# Patient Record
Sex: MALE | Race: OTHER | Employment: UNEMPLOYED | URBAN - METROPOLITAN AREA
[De-identification: names, ages, dates, MRNs, and addresses within clinical notes are randomized per-mention and may not be internally consistent; named-entity substitution may affect disease eponyms.]

---

## 2023-07-31 ENCOUNTER — APPOINTMENT (OUTPATIENT)
Dept: CT IMAGING | Age: 38
DRG: 699 | End: 2023-07-31
Payer: MEDICAID

## 2023-07-31 ENCOUNTER — APPOINTMENT (OUTPATIENT)
Dept: CT IMAGING | Age: 38
DRG: 699 | End: 2023-07-31
Attending: EMERGENCY MEDICINE
Payer: MEDICAID

## 2023-07-31 ENCOUNTER — HOSPITAL ENCOUNTER (INPATIENT)
Age: 38
LOS: 2 days | Discharge: HOME | DRG: 699 | End: 2023-08-02
Attending: EMERGENCY MEDICINE | Admitting: INTERNAL MEDICINE
Payer: MEDICAID

## 2023-07-31 DIAGNOSIS — D73.5 SPLENIC INFARCT: ICD-10-CM

## 2023-07-31 DIAGNOSIS — N28.0 RENAL INFARCT (HCC): Primary | ICD-10-CM

## 2023-07-31 LAB
ALBUMIN SERPL-MCNC: 4.5 G/DL (ref 3.4–5)
ALBUMIN/GLOB SERPL: 1.6 {RATIO} (ref 1.1–2.2)
ALP SERPL-CCNC: 82 U/L (ref 40–129)
ALT SERPL-CCNC: 67 U/L (ref 10–40)
ANION GAP SERPL CALCULATED.3IONS-SCNC: 15 MMOL/L (ref 3–16)
ANTI-XA UNFRAC HEPARIN: <0.1 IU/ML (ref 0.3–0.7)
ANTI-XA UNFRAC HEPARIN: <0.1 IU/ML (ref 0.3–0.7)
APTT BLD: 28.5 SEC (ref 22.7–35.9)
AST SERPL-CCNC: 59 U/L (ref 15–37)
BACTERIA URNS QL MICRO: ABNORMAL /HPF
BASOPHILS # BLD: 0.1 K/UL (ref 0–0.2)
BASOPHILS NFR BLD: 0.5 %
BILIRUB SERPL-MCNC: 0.9 MG/DL (ref 0–1)
BILIRUB UR QL STRIP.AUTO: ABNORMAL
BUN SERPL-MCNC: 14 MG/DL (ref 7–20)
CALCIUM SERPL-MCNC: 9.5 MG/DL (ref 8.3–10.6)
CHLORIDE SERPL-SCNC: 103 MMOL/L (ref 99–110)
CLARITY UR: CLEAR
CO2 SERPL-SCNC: 22 MMOL/L (ref 21–32)
COLOR UR: YELLOW
CREAT SERPL-MCNC: 0.8 MG/DL (ref 0.9–1.3)
DEPRECATED RDW RBC AUTO: 13.8 % (ref 12.4–15.4)
EOSINOPHIL # BLD: 0 K/UL (ref 0–0.6)
EOSINOPHIL NFR BLD: 0 %
GFR SERPLBLD CREATININE-BSD FMLA CKD-EPI: >60 ML/MIN/{1.73_M2}
GLUCOSE SERPL-MCNC: 120 MG/DL (ref 70–99)
GLUCOSE UR STRIP.AUTO-MCNC: NEGATIVE MG/DL
HCT VFR BLD AUTO: 44.5 % (ref 40.5–52.5)
HGB BLD-MCNC: 14.9 G/DL (ref 13.5–17.5)
HGB UR QL STRIP.AUTO: ABNORMAL
INR PPP: 1.21 (ref 0.84–1.16)
KETONES UR STRIP.AUTO-MCNC: 40 MG/DL
LEUKOCYTE ESTERASE UR QL STRIP.AUTO: NEGATIVE
LIPASE SERPL-CCNC: 12 U/L (ref 13–60)
LYMPHOCYTES # BLD: 0.7 K/UL (ref 1–5.1)
LYMPHOCYTES NFR BLD: 4.3 %
MCH RBC QN AUTO: 29.1 PG (ref 26–34)
MCHC RBC AUTO-ENTMCNC: 33.4 G/DL (ref 31–36)
MCV RBC AUTO: 87 FL (ref 80–100)
MONOCYTES # BLD: 0.8 K/UL (ref 0–1.3)
MONOCYTES NFR BLD: 4.9 %
NEUTROPHILS # BLD: 14.6 K/UL (ref 1.7–7.7)
NEUTROPHILS NFR BLD: 90.3 %
NITRITE UR QL STRIP.AUTO: NEGATIVE
PH UR STRIP.AUTO: 5.5 [PH] (ref 5–8)
PLATELET # BLD AUTO: 240 K/UL (ref 135–450)
PMV BLD AUTO: 8.3 FL (ref 5–10.5)
POTASSIUM SERPL-SCNC: 4.1 MMOL/L (ref 3.5–5.1)
PROT SERPL-MCNC: 7.3 G/DL (ref 6.4–8.2)
PROT UR STRIP.AUTO-MCNC: 100 MG/DL
PROTHROMBIN TIME: 15.3 SEC (ref 11.5–14.8)
RBC # BLD AUTO: 5.11 M/UL (ref 4.2–5.9)
RBC #/AREA URNS HPF: ABNORMAL /HPF (ref 0–4)
SODIUM SERPL-SCNC: 140 MMOL/L (ref 136–145)
SP GR UR STRIP.AUTO: >=1.03 (ref 1–1.03)
UA COMPLETE W REFLEX CULTURE PNL UR: ABNORMAL
UA DIPSTICK W REFLEX MICRO PNL UR: YES
URN SPEC COLLECT METH UR: ABNORMAL
UROBILINOGEN UR STRIP-ACNC: 1 E.U./DL
WBC # BLD AUTO: 16.2 K/UL (ref 4–11)
WBC #/AREA URNS HPF: ABNORMAL /HPF (ref 0–5)

## 2023-07-31 PROCEDURE — 85025 COMPLETE CBC W/AUTO DIFF WBC: CPT

## 2023-07-31 PROCEDURE — 74177 CT ABD & PELVIS W/CONTRAST: CPT

## 2023-07-31 PROCEDURE — 80053 COMPREHEN METABOLIC PANEL: CPT

## 2023-07-31 PROCEDURE — 96374 THER/PROPH/DIAG INJ IV PUSH: CPT

## 2023-07-31 PROCEDURE — 2580000003 HC RX 258: Performed by: INTERNAL MEDICINE

## 2023-07-31 PROCEDURE — 99285 EMERGENCY DEPT VISIT HI MDM: CPT

## 2023-07-31 PROCEDURE — 96361 HYDRATE IV INFUSION ADD-ON: CPT

## 2023-07-31 PROCEDURE — 6360000002 HC RX W HCPCS: Performed by: INTERNAL MEDICINE

## 2023-07-31 PROCEDURE — 6360000004 HC RX CONTRAST MEDICATION: Performed by: EMERGENCY MEDICINE

## 2023-07-31 PROCEDURE — 70450 CT HEAD/BRAIN W/O DYE: CPT

## 2023-07-31 PROCEDURE — 81001 URINALYSIS AUTO W/SCOPE: CPT

## 2023-07-31 PROCEDURE — 85520 HEPARIN ASSAY: CPT

## 2023-07-31 PROCEDURE — 85730 THROMBOPLASTIN TIME PARTIAL: CPT

## 2023-07-31 PROCEDURE — 6370000000 HC RX 637 (ALT 250 FOR IP): Performed by: INTERNAL MEDICINE

## 2023-07-31 PROCEDURE — 85610 PROTHROMBIN TIME: CPT

## 2023-07-31 PROCEDURE — 36415 COLL VENOUS BLD VENIPUNCTURE: CPT

## 2023-07-31 PROCEDURE — 96375 TX/PRO/DX INJ NEW DRUG ADDON: CPT

## 2023-07-31 PROCEDURE — 1200000000 HC SEMI PRIVATE

## 2023-07-31 PROCEDURE — 83690 ASSAY OF LIPASE: CPT

## 2023-07-31 PROCEDURE — 87040 BLOOD CULTURE FOR BACTERIA: CPT

## 2023-07-31 PROCEDURE — 6360000002 HC RX W HCPCS: Performed by: EMERGENCY MEDICINE

## 2023-07-31 PROCEDURE — 2580000003 HC RX 258: Performed by: EMERGENCY MEDICINE

## 2023-07-31 RX ORDER — HEPARIN SODIUM 1000 [USP'U]/ML
4000 INJECTION, SOLUTION INTRAVENOUS; SUBCUTANEOUS PRN
Status: DISCONTINUED | OUTPATIENT
Start: 2023-07-31 | End: 2023-08-02

## 2023-07-31 RX ORDER — POLYETHYLENE GLYCOL 3350 17 G/17G
17 POWDER, FOR SOLUTION ORAL DAILY PRN
Status: DISCONTINUED | OUTPATIENT
Start: 2023-07-31 | End: 2023-08-02 | Stop reason: HOSPADM

## 2023-07-31 RX ORDER — SODIUM CHLORIDE 9 MG/ML
INJECTION, SOLUTION INTRAVENOUS PRN
Status: DISCONTINUED | OUTPATIENT
Start: 2023-07-31 | End: 2023-08-02 | Stop reason: HOSPADM

## 2023-07-31 RX ORDER — WARFARIN SODIUM 5 MG/1
5 TABLET ORAL
Status: COMPLETED | OUTPATIENT
Start: 2023-07-31 | End: 2023-07-31

## 2023-07-31 RX ORDER — METOPROLOL SUCCINATE 25 MG/1
25 TABLET, EXTENDED RELEASE ORAL DAILY
Status: DISCONTINUED | OUTPATIENT
Start: 2023-07-31 | End: 2023-08-02 | Stop reason: HOSPADM

## 2023-07-31 RX ORDER — HEPARIN SODIUM 1000 [USP'U]/ML
4000 INJECTION, SOLUTION INTRAVENOUS; SUBCUTANEOUS ONCE
Status: COMPLETED | OUTPATIENT
Start: 2023-07-31 | End: 2023-07-31

## 2023-07-31 RX ORDER — SODIUM CHLORIDE 0.9 % (FLUSH) 0.9 %
5-40 SYRINGE (ML) INJECTION PRN
Status: DISCONTINUED | OUTPATIENT
Start: 2023-07-31 | End: 2023-08-02 | Stop reason: HOSPADM

## 2023-07-31 RX ORDER — NICOTINE 21 MG/24HR
1 PATCH, TRANSDERMAL 24 HOURS TRANSDERMAL DAILY
Status: DISCONTINUED | OUTPATIENT
Start: 2023-07-31 | End: 2023-08-02 | Stop reason: HOSPADM

## 2023-07-31 RX ORDER — OXYCODONE HYDROCHLORIDE 5 MG/1
10 TABLET ORAL EVERY 4 HOURS PRN
Status: DISCONTINUED | OUTPATIENT
Start: 2023-07-31 | End: 2023-08-02 | Stop reason: HOSPADM

## 2023-07-31 RX ORDER — PROCHLORPERAZINE EDISYLATE 5 MG/ML
10 INJECTION INTRAMUSCULAR; INTRAVENOUS EVERY 6 HOURS PRN
Status: DISCONTINUED | OUTPATIENT
Start: 2023-07-31 | End: 2023-08-02 | Stop reason: HOSPADM

## 2023-07-31 RX ORDER — OXYCODONE HYDROCHLORIDE 5 MG/1
7.5 TABLET ORAL ONCE
Status: COMPLETED | OUTPATIENT
Start: 2023-07-31 | End: 2023-07-31

## 2023-07-31 RX ORDER — ACETAMINOPHEN 325 MG/1
650 TABLET ORAL EVERY 6 HOURS PRN
Status: DISCONTINUED | OUTPATIENT
Start: 2023-07-31 | End: 2023-08-02 | Stop reason: HOSPADM

## 2023-07-31 RX ORDER — SODIUM CHLORIDE 0.9 % (FLUSH) 0.9 %
5-40 SYRINGE (ML) INJECTION EVERY 12 HOURS SCHEDULED
Status: DISCONTINUED | OUTPATIENT
Start: 2023-07-31 | End: 2023-08-02 | Stop reason: HOSPADM

## 2023-07-31 RX ORDER — HEPARIN SODIUM 10000 [USP'U]/100ML
1650 INJECTION, SOLUTION INTRAVENOUS CONTINUOUS
Status: DISCONTINUED | OUTPATIENT
Start: 2023-07-31 | End: 2023-08-02

## 2023-07-31 RX ORDER — 0.9 % SODIUM CHLORIDE 0.9 %
1000 INTRAVENOUS SOLUTION INTRAVENOUS ONCE
Status: COMPLETED | OUTPATIENT
Start: 2023-07-31 | End: 2023-07-31

## 2023-07-31 RX ORDER — OXYCODONE HYDROCHLORIDE 5 MG/1
5 TABLET ORAL ONCE
Status: COMPLETED | OUTPATIENT
Start: 2023-07-31 | End: 2023-07-31

## 2023-07-31 RX ORDER — KETOROLAC TROMETHAMINE 30 MG/ML
15 INJECTION, SOLUTION INTRAMUSCULAR; INTRAVENOUS ONCE
Status: COMPLETED | OUTPATIENT
Start: 2023-07-31 | End: 2023-07-31

## 2023-07-31 RX ORDER — ACETAMINOPHEN 650 MG/1
650 SUPPOSITORY RECTAL EVERY 6 HOURS PRN
Status: DISCONTINUED | OUTPATIENT
Start: 2023-07-31 | End: 2023-08-02 | Stop reason: HOSPADM

## 2023-07-31 RX ORDER — ONDANSETRON 2 MG/ML
4 INJECTION INTRAMUSCULAR; INTRAVENOUS ONCE
Status: COMPLETED | OUTPATIENT
Start: 2023-07-31 | End: 2023-07-31

## 2023-07-31 RX ORDER — HEPARIN SODIUM 1000 [USP'U]/ML
2000 INJECTION, SOLUTION INTRAVENOUS; SUBCUTANEOUS PRN
Status: DISCONTINUED | OUTPATIENT
Start: 2023-07-31 | End: 2023-08-02

## 2023-07-31 RX ORDER — OXYCODONE HYDROCHLORIDE 5 MG/1
5 TABLET ORAL EVERY 4 HOURS PRN
Status: DISCONTINUED | OUTPATIENT
Start: 2023-07-31 | End: 2023-08-02 | Stop reason: HOSPADM

## 2023-07-31 RX ADMIN — ACETAMINOPHEN 650 MG: 325 TABLET ORAL at 16:34

## 2023-07-31 RX ADMIN — HEPARIN SODIUM 4000 UNITS: 1000 INJECTION INTRAVENOUS; SUBCUTANEOUS at 13:51

## 2023-07-31 RX ADMIN — IOPAMIDOL 75 ML: 755 INJECTION, SOLUTION INTRAVENOUS at 10:40

## 2023-07-31 RX ADMIN — PROCHLORPERAZINE EDISYLATE 10 MG: 5 INJECTION INTRAMUSCULAR; INTRAVENOUS at 16:35

## 2023-07-31 RX ADMIN — HEPARIN SODIUM 820 UNITS/HR: 10000 INJECTION, SOLUTION INTRAVENOUS at 13:53

## 2023-07-31 RX ADMIN — METOPROLOL SUCCINATE 25 MG: 25 TABLET, EXTENDED RELEASE ORAL at 16:34

## 2023-07-31 RX ADMIN — OXYCODONE HYDROCHLORIDE 7.5 MG: 5 TABLET ORAL at 14:34

## 2023-07-31 RX ADMIN — OXYCODONE HYDROCHLORIDE 10 MG: 5 TABLET ORAL at 19:17

## 2023-07-31 RX ADMIN — ACETAMINOPHEN 650 MG: 325 TABLET ORAL at 22:42

## 2023-07-31 RX ADMIN — SACUBITRIL AND VALSARTAN 1 TABLET: 24; 26 TABLET, FILM COATED ORAL at 21:37

## 2023-07-31 RX ADMIN — ONDANSETRON 4 MG: 2 INJECTION INTRAMUSCULAR; INTRAVENOUS at 09:42

## 2023-07-31 RX ADMIN — OXYCODONE HYDROCHLORIDE 5 MG: 5 TABLET ORAL at 12:55

## 2023-07-31 RX ADMIN — KETOROLAC TROMETHAMINE 15 MG: 30 INJECTION, SOLUTION INTRAMUSCULAR at 09:42

## 2023-07-31 RX ADMIN — SODIUM CHLORIDE 1000 ML: 9 INJECTION, SOLUTION INTRAVENOUS at 09:42

## 2023-07-31 RX ADMIN — WARFARIN SODIUM 5 MG: 5 TABLET ORAL at 17:37

## 2023-07-31 RX ADMIN — HEPARIN SODIUM 4000 UNITS: 1000 INJECTION INTRAVENOUS; SUBCUTANEOUS at 21:33

## 2023-07-31 RX ADMIN — SODIUM CHLORIDE, PRESERVATIVE FREE 10 ML: 5 INJECTION INTRAVENOUS at 21:35

## 2023-07-31 ASSESSMENT — PAIN DESCRIPTION - FREQUENCY: FREQUENCY: CONTINUOUS

## 2023-07-31 ASSESSMENT — PAIN DESCRIPTION - DIRECTION: RADIATING_TOWARDS: FLANK

## 2023-07-31 ASSESSMENT — PAIN DESCRIPTION - DESCRIPTORS
DESCRIPTORS: SORE
DESCRIPTORS: THROBBING
DESCRIPTORS: BURNING;THROBBING

## 2023-07-31 ASSESSMENT — PAIN SCALES - GENERAL
PAINLEVEL_OUTOF10: 8
PAINLEVEL_OUTOF10: 10
PAINLEVEL_OUTOF10: 7
PAINLEVEL_OUTOF10: 7
PAINLEVEL_OUTOF10: 6
PAINLEVEL_OUTOF10: 4
PAINLEVEL_OUTOF10: 7
PAINLEVEL_OUTOF10: 7
PAINLEVEL_OUTOF10: 10
PAINLEVEL_OUTOF10: 7
PAINLEVEL_OUTOF10: 7
PAINLEVEL_OUTOF10: 4
PAINLEVEL_OUTOF10: 8

## 2023-07-31 ASSESSMENT — PAIN DESCRIPTION - LOCATION
LOCATION: FLANK
LOCATION: FLANK
LOCATION: ABDOMEN
LOCATION: FLANK;ABDOMEN
LOCATION: ABDOMEN;FLANK
LOCATION: ABDOMEN;BACK
LOCATION: FLANK
LOCATION: FLANK

## 2023-07-31 ASSESSMENT — PAIN DESCRIPTION - ORIENTATION
ORIENTATION: RIGHT

## 2023-07-31 ASSESSMENT — PAIN - FUNCTIONAL ASSESSMENT
PAIN_FUNCTIONAL_ASSESSMENT: ACTIVITIES ARE NOT PREVENTED
PAIN_FUNCTIONAL_ASSESSMENT: PREVENTS OR INTERFERES SOME ACTIVE ACTIVITIES AND ADLS
PAIN_FUNCTIONAL_ASSESSMENT: 0-10

## 2023-07-31 ASSESSMENT — PAIN DESCRIPTION - PAIN TYPE
TYPE: ACUTE PAIN
TYPE: ACUTE PAIN

## 2023-07-31 ASSESSMENT — PAIN DESCRIPTION - ONSET: ONSET: ON-GOING

## 2023-07-31 NOTE — ED NOTES
Lab in room to draw blood Dr Osiel Viera at bedside     Diane HermelindoBenton, [place]  07/31/23 9734

## 2023-07-31 NOTE — PROGRESS NOTES
4 Eyes Skin Assessment     The patient is being assess for  Admission    I agree that 2 RN's have performed a thorough Head to Toe Skin Assessment on the patient. ALL assessment sites listed below have been assessed. Areas assessed by both nurses: Chris Mckenzie RN  [x]   Head, Face, and Ears   [x]   Shoulders, Back, and Chest  [x]   Arms, Elbows, and Hands   [x]   Coccyx, Sacrum, and Ischum  [x]   Legs, Feet, and Heels        Does the Patient have Skin Breakdown?   No         Jasbir Prevention initiated:  No   Wound Care Orders initiated:  No      [facility] nurse consulted for Pressure Injury (Stage 3,4, Unstageable, DTI, NWPT, and Complex wounds):  NA      Nurse 1 eSignature: Electronically signed by Mandie Sellers RN on 7/31/23 at 4:29 PM EDT    **SHARE this note so that the co-signing nurse is able to place an eSignature**    Nurse 2 eSignature: {Esignature:[id number]}

## 2023-07-31 NOTE — ED NOTES
Abdomen soft and non distended. Active bs x 4 quads. Right flank pain radiating to rlq.  C/o nausea with no diarrhea     Candy Avila RN  07/31/23 1738

## 2023-07-31 NOTE — ED NOTES
Message sent to  [facility] r/t pt pain control     Odalys Arita RN  07/31/23 4941

## 2023-07-31 NOTE — ED PROVIDER NOTES
[address] [place] [address]  ED    CHIEF COMPLAINT  Flank Pain (Right sided started yesterday. Given Fentanyl 50 mcg im and Zofran 4 mg oral per EMS)       HISTORY OF PRESENT ILLNESS  Sharona Patterson is a 42 y.o. male with past medical history muscular dystrophy who presents to the ED with right flank pain. Symptoms started yesterday evening. Pain is described as aching, worse with movement, better with rest, associate with nausea but no emesis. Denies fever, chest pain, shortness of breath, diarrhea, dysuria, hematuria. Presents by EMS who administered 50 mcg fentanyl IM and 4 mg Zofran p.o. prior to arrival    I have reviewed the following from the nursing documentation:    Past Medical History:   Diagnosis Date    Muscular dystrophy ([address])      Past Surgical History:   Procedure Laterality Date    PACEMAKER INSERTION       History reviewed. No pertinent family history.   Social History     Socioeconomic History    Marital status: Single     Spouse name: Not on file    Number of children: Not on file    Years of education: Not on file    Highest education level: Not on file   Occupational History    Not on file   Tobacco Use    Smoking status: Every Day     Packs/day: 0.25     Types: Cigarettes    Smokeless tobacco: Not on file   Substance and Sexual Activity    Alcohol use: No    Drug use: Yes     Types: Marijuana Renay Maher)    Sexual activity: Not on file   Other Topics Concern    Not on file   Social History Narrative    Not on file     Social Determinants of Health     Financial Resource Strain: Not on file   Food Insecurity: Not on file   Transportation Needs: Not on file   Physical Activity: Not on file   Stress: Not on file   Social Connections: Not on file   Intimate Partner Violence: Not on file   Housing Stability: Not on file     Current Facility-Administered Medications   Medication Dose Route Frequency Provider Last Rate Last Admin    heparin (porcine) injection 4,000 Units  4,000 Units IntraVENous Once Luis A Leon

## 2023-07-31 NOTE — PROGRESS NOTES
Patient from ED, report from [facility]. Four eye assessment completed with Roque RN, no skin issues noted. Assessment completed and documented. VSS. A/ox4. C/o continuous pain right abd pain that radiates to right flank, heat packs and tylenol given per mar. X1 with walker. Patient with high arches and unable to touch ground with heels due to diagnosis. Heparin drip running through PIV. Bed locked and in lowest position. Bedside table and call light within reach. Denies further needs at this time.

## 2023-07-31 NOTE — PLAN OF CARE
PLAN OF CARE    Received request for inpatient admission. Admitting provider Dr. Elba Klein notified.     Leonel Crow MD  7/31/2023  11:27 AM

## 2023-07-31 NOTE — H&P
nonspecific. Mucosal irregularity of the distal stomach. Correlate for gastritis. Small right pleural effusion. 1.5 cm left adrenal mass, consistent with lipid-rich benign adenoma. No follow-up imaging is recommended. JACR 2017 Aug; 14(8):1038-44, JCAT 2016 Susi Ruperto; 40(2):194-200, Urol J 2006 Spring; 3(2):71-4.        No Order

## 2023-08-01 LAB
ALBUMIN SERPL-MCNC: 3.7 G/DL (ref 3.4–5)
ALP SERPL-CCNC: 69 U/L (ref 40–129)
ALT SERPL-CCNC: 166 U/L (ref 10–40)
ANION GAP SERPL CALCULATED.3IONS-SCNC: 11 MMOL/L (ref 3–16)
ANTI-XA UNFRAC HEPARIN: 0.13 IU/ML (ref 0.3–0.7)
ANTI-XA UNFRAC HEPARIN: 0.14 IU/ML (ref 0.3–0.7)
ANTI-XA UNFRAC HEPARIN: 0.17 IU/ML (ref 0.3–0.7)
ANTI-XA UNFRAC HEPARIN: 0.25 IU/ML (ref 0.3–0.7)
AST SERPL-CCNC: 128 U/L (ref 15–37)
BILIRUB DIRECT SERPL-MCNC: 0.3 MG/DL (ref 0–0.3)
BILIRUB INDIRECT SERPL-MCNC: 0.6 MG/DL (ref 0–1)
BILIRUB SERPL-MCNC: 0.9 MG/DL (ref 0–1)
BUN SERPL-MCNC: 14 MG/DL (ref 7–20)
CALCIUM SERPL-MCNC: 8.7 MG/DL (ref 8.3–10.6)
CHLORIDE SERPL-SCNC: 104 MMOL/L (ref 99–110)
CO2 SERPL-SCNC: 19 MMOL/L (ref 21–32)
CREAT SERPL-MCNC: 0.8 MG/DL (ref 0.9–1.3)
DEPRECATED RDW RBC AUTO: 13.8 % (ref 12.4–15.4)
GFR SERPLBLD CREATININE-BSD FMLA CKD-EPI: >60 ML/MIN/{1.73_M2}
GLUCOSE SERPL-MCNC: 97 MG/DL (ref 70–99)
HAV IGM SERPL QL IA: NORMAL
HBV CORE IGM SERPL QL IA: NORMAL
HBV SURFACE AG SERPL QL IA: NORMAL
HCT VFR BLD AUTO: 42.7 % (ref 40.5–52.5)
HCV AB SERPL QL IA: NORMAL
HGB BLD-MCNC: 14.3 G/DL (ref 13.5–17.5)
HIV 1+2 AB+HIV1 P24 AG SERPL QL IA: NORMAL
HIV 2 AB SERPL QL IA: NORMAL
HIV1 AB SERPL QL IA: NORMAL
HIV1 P24 AG SERPL QL IA: NORMAL
INR PPP: 1.35 (ref 0.84–1.16)
LV EF: 28 %
LVEF MODALITY: NORMAL
MCH RBC QN AUTO: 29 PG (ref 26–34)
MCHC RBC AUTO-ENTMCNC: 33.5 G/DL (ref 31–36)
MCV RBC AUTO: 86.7 FL (ref 80–100)
PLATELET # BLD AUTO: 189 K/UL (ref 135–450)
PMV BLD AUTO: 8.4 FL (ref 5–10.5)
POTASSIUM SERPL-SCNC: 3.6 MMOL/L (ref 3.5–5.1)
PROT SERPL-MCNC: 6.3 G/DL (ref 6.4–8.2)
PROTHROMBIN TIME: 16.6 SEC (ref 11.5–14.8)
RBC # BLD AUTO: 4.93 M/UL (ref 4.2–5.9)
SODIUM SERPL-SCNC: 134 MMOL/L (ref 136–145)
WBC # BLD AUTO: 16.3 K/UL (ref 4–11)

## 2023-08-01 PROCEDURE — 85610 PROTHROMBIN TIME: CPT

## 2023-08-01 PROCEDURE — 99222 1ST HOSP IP/OBS MODERATE 55: CPT | Performed by: INTERNAL MEDICINE

## 2023-08-01 PROCEDURE — 85027 COMPLETE CBC AUTOMATED: CPT

## 2023-08-01 PROCEDURE — C8929 TTE W OR WO FOL WCON,DOPPLER: HCPCS

## 2023-08-01 PROCEDURE — 86701 HIV-1ANTIBODY: CPT

## 2023-08-01 PROCEDURE — 93356 MYOCRD STRAIN IMG SPCKL TRCK: CPT

## 2023-08-01 PROCEDURE — 80048 BASIC METABOLIC PNL TOTAL CA: CPT

## 2023-08-01 PROCEDURE — 85520 HEPARIN ASSAY: CPT

## 2023-08-01 PROCEDURE — 87390 HIV-1 AG IA: CPT

## 2023-08-01 PROCEDURE — 97166 OT EVAL MOD COMPLEX 45 MIN: CPT

## 2023-08-01 PROCEDURE — 97535 SELF CARE MNGMENT TRAINING: CPT

## 2023-08-01 PROCEDURE — 1200000000 HC SEMI PRIVATE

## 2023-08-01 PROCEDURE — 36415 COLL VENOUS BLD VENIPUNCTURE: CPT

## 2023-08-01 PROCEDURE — 80074 ACUTE HEPATITIS PANEL: CPT

## 2023-08-01 PROCEDURE — 2580000003 HC RX 258: Performed by: INTERNAL MEDICINE

## 2023-08-01 PROCEDURE — 86702 HIV-2 ANTIBODY: CPT

## 2023-08-01 PROCEDURE — 97530 THERAPEUTIC ACTIVITIES: CPT

## 2023-08-01 PROCEDURE — 6370000000 HC RX 637 (ALT 250 FOR IP): Performed by: INTERNAL MEDICINE

## 2023-08-01 PROCEDURE — 97162 PT EVAL MOD COMPLEX 30 MIN: CPT

## 2023-08-01 PROCEDURE — 6360000002 HC RX W HCPCS: Performed by: INTERNAL MEDICINE

## 2023-08-01 PROCEDURE — 80076 HEPATIC FUNCTION PANEL: CPT

## 2023-08-01 RX ORDER — HEPARIN SODIUM 1000 [USP'U]/ML
2000 INJECTION, SOLUTION INTRAVENOUS; SUBCUTANEOUS ONCE
Status: COMPLETED | OUTPATIENT
Start: 2023-08-01 | End: 2023-08-01

## 2023-08-01 RX ORDER — WARFARIN SODIUM 5 MG/1
5 TABLET ORAL
Status: COMPLETED | OUTPATIENT
Start: 2023-08-01 | End: 2023-08-01

## 2023-08-01 RX ADMIN — SODIUM CHLORIDE, PRESERVATIVE FREE 10 ML: 5 INJECTION INTRAVENOUS at 20:10

## 2023-08-01 RX ADMIN — HEPARIN SODIUM 2000 UNITS: 1000 INJECTION INTRAVENOUS; SUBCUTANEOUS at 17:15

## 2023-08-01 RX ADMIN — OXYCODONE HYDROCHLORIDE 10 MG: 5 TABLET ORAL at 00:08

## 2023-08-01 RX ADMIN — OXYCODONE HYDROCHLORIDE 10 MG: 5 TABLET ORAL at 22:32

## 2023-08-01 RX ADMIN — METOPROLOL SUCCINATE 25 MG: 25 TABLET, EXTENDED RELEASE ORAL at 08:03

## 2023-08-01 RX ADMIN — OXYCODONE HYDROCHLORIDE 5 MG: 5 TABLET ORAL at 08:03

## 2023-08-01 RX ADMIN — HEPARIN SODIUM 2000 UNITS: 1000 INJECTION INTRAVENOUS; SUBCUTANEOUS at 10:14

## 2023-08-01 RX ADMIN — SACUBITRIL AND VALSARTAN 1 TABLET: 24; 26 TABLET, FILM COATED ORAL at 20:07

## 2023-08-01 RX ADMIN — WARFARIN SODIUM 5 MG: 5 TABLET ORAL at 17:14

## 2023-08-01 RX ADMIN — SACUBITRIL AND VALSARTAN 1 TABLET: 24; 26 TABLET, FILM COATED ORAL at 08:03

## 2023-08-01 RX ADMIN — OXYCODONE HYDROCHLORIDE 10 MG: 5 TABLET ORAL at 15:43

## 2023-08-01 RX ADMIN — HEPARIN SODIUM 2000 UNITS: 1000 INJECTION INTRAVENOUS; SUBCUTANEOUS at 04:13

## 2023-08-01 ASSESSMENT — PAIN SCALES - GENERAL
PAINLEVEL_OUTOF10: 6
PAINLEVEL_OUTOF10: 6
PAINLEVEL_OUTOF10: 7
PAINLEVEL_OUTOF10: 7
PAINLEVEL_OUTOF10: 8

## 2023-08-01 ASSESSMENT — PAIN DESCRIPTION - LOCATION
LOCATION: BACK
LOCATION: FLANK
LOCATION: ABDOMEN;FLANK

## 2023-08-01 ASSESSMENT — PAIN DESCRIPTION - ORIENTATION
ORIENTATION: RIGHT
ORIENTATION: RIGHT

## 2023-08-01 ASSESSMENT — PAIN DESCRIPTION - DESCRIPTORS
DESCRIPTORS: SORE
DESCRIPTORS: ACHING

## 2023-08-01 NOTE — PLAN OF CARE
Problem: Safety - Adult  Goal: Free from fall injury  Outcome: Progressing  Flowsheets (Taken 8/1/2023 0140)  Free From Fall Injury: Instruct family/caregiver on patient safety     Problem: Pain  Goal: Verbalizes/displays adequate comfort level or baseline comfort level  8/1/2023 0140 by Shahnaz Castillo RN  Outcome: Not Progressing  Flowsheets (Taken 8/1/2023 0140)  Verbalizes/displays adequate comfort level or baseline comfort level:   Encourage patient to monitor pain and request assistance   Assess pain using appropriate pain scale   Administer analgesics based on type and severity of pain and evaluate response   Implement non-pharmacological measures as appropriate and evaluate response  7/31/2023 1618 by Luis Ji RN  Outcome: Progressing

## 2023-08-01 NOTE — CONSULTS
Consult Placed     Who: leisa  Date:7/31/23  Time:1700     Electronically signed by Sheila Cevallos on 7/31/2023 at 4:56 PM
Pharmacy Note  Warfarin Consult  Dx: paroxysmal Aflutter   Goal INR range 2-3   Home Warfarin dose: New restart   New start Warfarin with Heparin bridge. Would recommend continue bridge until INR therapeutic. Date  INR  Warfarin   7/31                1.21                     5 mg     Recommend Warfarin 5 mg tonight x1. Daily INR ordered. Rx will continue to manage therapy per consult order. Valerie Gibson, [place]    7/31/2023 5:24 PM      7/31/2023 at 2004  Anti-Xa Unfrac Heparin   <0.10   IU/mL  - Heparin _4000_ units IVP bolus and then increase Heparin infusion to _1090_ units/hr. - Recheck Anti-Xa in 6 hours at 0300.   Valerie Gibson PharmD    7/31/2023 9:01 PM  ______________________________________________________________
Pharmacy to Manage Heparin Infusion per [facility]    Dx: Renal infarct, splenic infarct  Pt wt = 68 kg (will use adjusted wt if actual body weight > 120% ideal body weight). Baseline aPTT = 28.5 sec ,  INR= 1.21, Anti-Xa= pending    Heparin (weight-based) Infusion: CAD/STEMI/NSTEMI/UA/AFib)   Heparin 4000 unit bolus followed by Heparin infusion at 820 units/hr  Recheck anti-Xa (unless aPTT being used) in 6 hours.   Goal anti-Xa 0.3-0.7 IU/mL    Shahla Mobley, PharmD [unfilled]  1:40 PM      ----------------------------  8/1 8818  Low-Dose Heparin Drip  Current Rate: 1090 units/hr  8/1 @ 0307 Anti-Xa Level= 0.13  Plan: Per pharmacy dosing, we will give a bolus dose of 2000 units and increase heparin drip rate to 1230 units/hr    Next Anti-Xa Level: 8/1 @ [address], PharmD 8/1/2023 3:49 AM    Low-Dose Heparin Drip  Current Rate: 1230 units/hr  8/1 @ 0932 Anti-Xa Level= 0.14  Plan: Per pharmacy dosing, we will give a bolus dose of 2000 units and increase heparin drip rate to 1370 units/hr    Next Anti-Xa Level: 8/1 @ 1600    Carolina Howe, PharmD 8/1/2023 10:06 AM    8/1 1557  Anti-Xa - 0.17  Give 2000 unit heparin bolus and increase heparin infusion to 1510 units/hr  Next anti-Xa at [address] [place] [address], PharmD 8/1/2023 4:31 PM    -----------------------------  8/2 0002  Low-Dose Heparin Drip  Current Rate: 1510 units/hr  8/1 @ 2317 Anti-Xa Level= 0.25  Plan: Per pharmacy dosing, we will give a bolus dose of 2000 units and increase heparin drip rate to 1650 units/hr    Next Anti-Xa Level: 8/2 @ 1808 Shaheen Marie, PharmD 8/2/2023 12:01 AM    ------------------------------  8/2 8982  Low-Dose Heparin Drip  Current Rate: 1650 units/hr  8/2 @ 0615 Anti-Xa Level= 0.31  Plan: Per pharmacy dosing, we will not make any changes at this time    Next Anti-Xa Level: 8/2 @ [address], PharmD 8/2/2023 6:40 AM
admission  Patient willing to restart taking all his medications now including beta-blocker and Entresto  Resume outpatient follow-up with cardiology following discharge      All questions and concerns were addressed to the patient/family. Alternatives to my treatment as well as risks and benefits of proposed treatment were discussed. Tobacco use was discussed with the patient and educated on the negative effects. Thank you for allowing to us to participate in the care or Martín Blakely. Please call with questions.          Jennifer Wilson MD, [facility], [address] [place] [address]  8/1/2023  [phone]      Inadvertent computerized transcription errors may be present

## 2023-08-01 NOTE — PROGRESS NOTES
Pharmacy Note  Warfarin Consult  Dx: paroxysmal Aflutter   Goal INR range 2-3   Home Warfarin dose: New restart   New start Warfarin with Heparin bridge. Would recommend continue bridge until INR therapeutic. Date                 INR                  Warfarin   7/31                1.21                     5 mg   8/1                  1.35                     5 mg     Recommend Warfarin 5 mg tonight x1. Daily INR ordered. Rx will continue to manage therapy per consult order.     Ebony Isbell, PharmD 8/1/2023 3:54 AM

## 2023-08-01 NOTE — PROGRESS NOTES
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 A diagnosis of Splenic infarct was also pertinent to this visit. Past Medical History:  has a past medical history of Muscular dystrophy ([address] Past Surgical History:  has a past surgical history that includes Pacemaker insertion. Assessment   Body Structures, Functions, Activity Limitations Requiring Skilled Therapeutic Intervention: Decreased functional mobility ; Decreased endurance;Decreased posture;Decreased balance;Decreased strength;Decreased ROM; Increased pain  Assessment: [facility] to [facility] with diagnosis of R flank pain with hx of myotonic dystrophy. PTA pt has been living with his friend and friends mother in an apartment with 2+6 MAX. Pt was recently incarcerated and was using a WC in prision. Started using walker in the apartment once out and WC in the community. Pt was able to complete transfers independently. Pt currently presents below baseline function requires grossly mod A for bed mobility and transfers. Initially min A for ambulation, progresses to Max with fatigue and bilateral knee buckling and pt needing to sit down. Unable to ambulate further. Pt will benefit from skilled PT services to address current deficits. It is rec pt DC to IPR when deemed medically appropraite. Pt motivated to particiapte in therapy and demos ability to toelrate 3hrs of therapy a day. Co-tx collaboration this date to safely meet goals and will have better occupational performance outcomes with in a co-treatment than 1:1 session.     Therapy Prognosis: Good  Decision Making: Medium Complexity  Barriers to Learning: None  Requires PT Follow-Up: Yes  Activity

## 2023-08-01 NOTE — PROGRESS NOTES
Occupational Therapy  Facility/Department: [facility] C3 TELE/MED SURG/ONC  Occupational Therapy Initial Assessment/Treatment    Name: Charissa Harding  : 1985  MRN: [id number]  Date of Service: 2023    Discharge Recommendations:  IP Rehab      Therapy discharge recommendations take into account each patient's current medical complexities and are subject to input/oversight from the patient's healthcare team.     Barriers to Home Discharge:   [x] Steps to access home entry or bed/bath: 2+6 MAX   [x] Unable to transfer, ambulate, or propel wheelchair household distances without assist   [] Limited available assist at home upon discharge    [] Patient or family requests d/c to post-acute facility    [] Poor cognition/safety awareness for d/c to home alone    [] Unable to maintain ordered weight bearing status    [] Patient with salient signs of long-standing immobility   [x] Decreased independence with ADLs   [x] Increased risk for falls   [] Other:    If pt is unable to be seen after this session, please let this note serve as discharge summary. Please see case management note for discharge disposition. Thank you. 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 A diagnosis of Splenic infarct was also pertinent to this visit. Past Medical History:  has a past medical history of Muscular dystrophy ([address] Past Surgical History:  has a past surgical history that includes Pacemaker insertion. Assessment   Performance deficits / Impairments: Decreased functional mobility ; Decreased ADL status; Decreased strength;Decreased cognition  Patient admitted from home w/ abdominal pain, h/o muscular dystrophy, rather IPTA using RW or w/c. Limited flexion in elbows this is baseline. Patient today, Rinda Hashimoto for LE dressing with Phan progressing to max of 2 for mobility 2* BLE buckling during mobility.  After evaluation, pt found to be presenting with the above mentioned occupational

## 2023-08-01 NOTE — PROGRESS NOTES
Hospital Medicine Progress Note        Subjective:  He was feeling a little less pain. General appearance: No apparent distress, appears stated age and cooperative. HEENT: Pupils equal, round. Conjunctivae/corneas clear. Neck: No jugular venous distention. Respiratory:  Normal respiratory effort. Bilaterally without Rales/Wheezes/Rhonchi. Cardiovascular: Normal rate and regular rhythm with normal S1/S2 without murmurs, rubs or gallops. Abdomen: Soft, non-tender, non-distended with normal bowel sounds. Musculoskeletal: No cyanosis bilaterally. No edema. Without deformity. Skin: No jaundice. No rashes or lesions. Neurologic:  Neurovascularly intact without any focal sensory/motor deficits. Cranial nerves: II-XII intact, grossly non-focal.  Psychiatric: Alert and oriented, normal insight. Assessment and Plan:       The patient is a pleasant 42 Y M with a h/o ongoing cigarette smoking and marijuana use, former heroin abuse (he says he used to snort it years ago, never injected anything), complex cardiac disease, and myotonic dystrophy. He was incarcerated for years on drug charges, just got out a couple months ago, has been staying [place] some people\" in their apartment, is not working. He says he has continued to abstain from opioids since being released from snf. He is able to walk with a walker, manages to navigate stairs. He had been feeling fairly normal until yesterday, when he developed severe R flank / R abdominal pain. It continued to worsen so he came to the ED. Labs and vitals were fairly unremarkable, but CT showed a large R renal infarct and a possible thrombus of his R renal artery. There was also a splenic infarct. His sigmoid colon looked nonspecifically inflamed and radiology felt that ischemia was a possibility there as well. He isn't having any diarrhea or hematochezia                On chart review I see that the patient has a h/o Atrial flutter.   He

## 2023-08-01 NOTE — PLAN OF CARE
Occupational  Therapy POC: Increase patients ADLs/functional status to baseline.   Electronically signed by Gilbert Jenkins OT on 8/1/2023 at 1:48 PM

## 2023-08-01 NOTE — PLAN OF CARE
Problem: Pain  Goal: Verbalizes/displays adequate comfort level or baseline comfort level  8/1/2023 1047 by Irineo Olsen RN  Flowsheets (Taken 8/1/2023 1047)  Verbalizes/displays adequate comfort level or baseline comfort level:   Encourage patient to monitor pain and request assistance   Administer analgesics based on type and severity of pain and evaluate response   Assess pain using appropriate pain scale  8/1/2023 0140 by Isaac Hernandez RN  Outcome: Not Progressing  Flowsheets (Taken 8/1/2023 0140)  Verbalizes/displays adequate comfort level or baseline comfort level:   Encourage patient to monitor pain and request assistance   Assess pain using appropriate pain scale   Administer analgesics based on type and severity of pain and evaluate response   Implement non-pharmacological measures as appropriate and evaluate response     Problem: Safety - Adult  Goal: Free from fall injury  8/1/2023 1047 by Irineo Olsen RN  Outcome: Progressing  Flowsheets (Taken 8/1/2023 1047)  Free From Fall Injury: Instruct family/caregiver on patient safety  8/1/2023 0140 by Isaac Hernandez RN  Outcome: Progressing  Flowsheets (Taken 8/1/2023 0140)  Free From Fall Injury: Instruct family/caregiver on patient safety     Problem: Pain  Goal: Verbalizes/displays adequate comfort level or baseline comfort level  8/1/2023 1047 by Irineo Olsen RN  Flowsheets (Taken 8/1/2023 1047)  Verbalizes/displays adequate comfort level or baseline comfort level:   Encourage patient to monitor pain and request assistance   Administer analgesics based on type and severity of pain and evaluate response   Assess pain using appropriate pain scale  8/1/2023 0140 by Isaac Hernandez RN  Outcome: Not Progressing  Flowsheets (Taken 8/1/2023 0140)  Verbalizes/displays adequate comfort level or baseline comfort level:   Encourage patient to monitor pain and request assistance   Assess pain using appropriate pain scale   Administer analgesics based on

## 2023-08-01 NOTE — PROGRESS NOTES
Received patient on bed, alert and oriented x4. Vitals are stable. On PRN analgesia for pain control. Uses urinal since patient has muscular dystrophy, more prominent on both feet. On heparin drip, adjusted dose based on anti-Xa. On telemetry monitoring. Call bell within reach. Bed alarm on. Maintained a calm and comfortable environment. Shift assessment updated and documented.

## 2023-08-02 ENCOUNTER — APPOINTMENT (OUTPATIENT)
Dept: VASCULAR LAB | Age: 38
DRG: 699 | End: 2023-08-02
Payer: MEDICAID

## 2023-08-02 VITALS
RESPIRATION RATE: 16 BRPM | SYSTOLIC BLOOD PRESSURE: 120 MMHG | BODY MASS INDEX: 24.89 KG/M2 | OXYGEN SATURATION: 98 % | WEIGHT: 149.4 LBS | HEIGHT: 65 IN | DIASTOLIC BLOOD PRESSURE: 77 MMHG | TEMPERATURE: 97.6 F | HEART RATE: 65 BPM

## 2023-08-02 LAB
ALBUMIN SERPL-MCNC: 3 G/DL (ref 3.4–5)
ALP SERPL-CCNC: 60 U/L (ref 40–129)
ALT SERPL-CCNC: 90 U/L (ref 10–40)
ANION GAP SERPL CALCULATED.3IONS-SCNC: 9 MMOL/L (ref 3–16)
ANTI-XA UNFRAC HEPARIN: 0.31 IU/ML (ref 0.3–0.7)
AST SERPL-CCNC: 32 U/L (ref 15–37)
BASOPHILS # BLD: 0 K/UL (ref 0–0.2)
BASOPHILS NFR BLD: 0 %
BILIRUB DIRECT SERPL-MCNC: 0.3 MG/DL (ref 0–0.3)
BILIRUB INDIRECT SERPL-MCNC: 0.6 MG/DL (ref 0–1)
BILIRUB SERPL-MCNC: 0.9 MG/DL (ref 0–1)
BUN SERPL-MCNC: 17 MG/DL (ref 7–20)
CALCIUM SERPL-MCNC: 8.1 MG/DL (ref 8.3–10.6)
CHLORIDE SERPL-SCNC: 105 MMOL/L (ref 99–110)
CO2 SERPL-SCNC: 20 MMOL/L (ref 21–32)
CREAT SERPL-MCNC: 0.8 MG/DL (ref 0.9–1.3)
DEPRECATED RDW RBC AUTO: 13.6 % (ref 12.4–15.4)
EOSINOPHIL # BLD: 0 K/UL (ref 0–0.6)
EOSINOPHIL NFR BLD: 0 %
GFR SERPLBLD CREATININE-BSD FMLA CKD-EPI: >60 ML/MIN/{1.73_M2}
GLUCOSE SERPL-MCNC: 104 MG/DL (ref 70–99)
HCT VFR BLD AUTO: 40.1 % (ref 40.5–52.5)
HGB BLD-MCNC: 13.3 G/DL (ref 13.5–17.5)
INR PPP: 1.86 (ref 0.84–1.16)
LYMPHOCYTES # BLD: 1.9 K/UL (ref 1–5.1)
LYMPHOCYTES NFR BLD: 10 %
MAGNESIUM SERPL-MCNC: 2 MG/DL (ref 1.8–2.4)
MCH RBC QN AUTO: 28.7 PG (ref 26–34)
MCHC RBC AUTO-ENTMCNC: 33.1 G/DL (ref 31–36)
MCV RBC AUTO: 86.8 FL (ref 80–100)
MONOCYTES # BLD: 0.7 K/UL (ref 0–1.3)
MONOCYTES NFR BLD: 4 %
NEUTROPHILS # BLD: 16 K/UL (ref 1.7–7.7)
NEUTROPHILS NFR BLD: 83 %
NEUTS BAND NFR BLD MANUAL: 3 % (ref 0–7)
PLATELET # BLD AUTO: 172 K/UL (ref 135–450)
PLATELET BLD QL SMEAR: ADEQUATE
PMV BLD AUTO: 8.3 FL (ref 5–10.5)
POTASSIUM SERPL-SCNC: 3.5 MMOL/L (ref 3.5–5.1)
PROT SERPL-MCNC: 5.5 G/DL (ref 6.4–8.2)
PROTHROMBIN TIME: 21.4 SEC (ref 11.5–14.8)
RBC # BLD AUTO: 4.62 M/UL (ref 4.2–5.9)
SLIDE REVIEW: ABNORMAL
SODIUM SERPL-SCNC: 134 MMOL/L (ref 136–145)
WBC # BLD AUTO: 18.6 K/UL (ref 4–11)

## 2023-08-02 PROCEDURE — 80076 HEPATIC FUNCTION PANEL: CPT

## 2023-08-02 PROCEDURE — 85025 COMPLETE CBC W/AUTO DIFF WBC: CPT

## 2023-08-02 PROCEDURE — 6370000000 HC RX 637 (ALT 250 FOR IP): Performed by: INTERNAL MEDICINE

## 2023-08-02 PROCEDURE — 36415 COLL VENOUS BLD VENIPUNCTURE: CPT

## 2023-08-02 PROCEDURE — 85520 HEPARIN ASSAY: CPT

## 2023-08-02 PROCEDURE — 2580000003 HC RX 258: Performed by: INTERNAL MEDICINE

## 2023-08-02 PROCEDURE — 83735 ASSAY OF MAGNESIUM: CPT

## 2023-08-02 PROCEDURE — 93970 EXTREMITY STUDY: CPT

## 2023-08-02 PROCEDURE — 80048 BASIC METABOLIC PNL TOTAL CA: CPT

## 2023-08-02 PROCEDURE — 85610 PROTHROMBIN TIME: CPT

## 2023-08-02 PROCEDURE — 6360000002 HC RX W HCPCS: Performed by: INTERNAL MEDICINE

## 2023-08-02 RX ORDER — METOPROLOL SUCCINATE 25 MG/1
25 TABLET, EXTENDED RELEASE ORAL DAILY
Qty: 30 TABLET | Refills: 3 | Status: SHIPPED | OUTPATIENT
Start: 2023-08-02

## 2023-08-02 RX ORDER — OXYCODONE HYDROCHLORIDE 5 MG/1
5 TABLET ORAL EVERY 6 HOURS PRN
Qty: 10 TABLET | Refills: 0 | Status: SHIPPED | OUTPATIENT
Start: 2023-08-02 | End: 2023-08-07

## 2023-08-02 RX ORDER — WARFARIN SODIUM 2.5 MG/1
2.5 TABLET ORAL
Status: DISCONTINUED | OUTPATIENT
Start: 2023-08-02 | End: 2023-08-02

## 2023-08-02 RX ORDER — NICOTINE 21 MG/24HR
1 PATCH, TRANSDERMAL 24 HOURS TRANSDERMAL DAILY
Qty: 30 PATCH | Refills: 0 | Status: SHIPPED | OUTPATIENT
Start: 2023-08-03

## 2023-08-02 RX ORDER — HEPARIN SODIUM 1000 [USP'U]/ML
2000 INJECTION, SOLUTION INTRAVENOUS; SUBCUTANEOUS ONCE
Status: COMPLETED | OUTPATIENT
Start: 2023-08-02 | End: 2023-08-02

## 2023-08-02 RX ADMIN — SODIUM CHLORIDE, PRESERVATIVE FREE 10 ML: 5 INJECTION INTRAVENOUS at 08:37

## 2023-08-02 RX ADMIN — METOPROLOL SUCCINATE 25 MG: 25 TABLET, EXTENDED RELEASE ORAL at 08:36

## 2023-08-02 RX ADMIN — OXYCODONE HYDROCHLORIDE 5 MG: 5 TABLET ORAL at 13:34

## 2023-08-02 RX ADMIN — SACUBITRIL AND VALSARTAN 1 TABLET: 24; 26 TABLET, FILM COATED ORAL at 08:36

## 2023-08-02 RX ADMIN — HEPARIN SODIUM 2000 UNITS: 1000 INJECTION INTRAVENOUS; SUBCUTANEOUS at 00:09

## 2023-08-02 RX ADMIN — APIXABAN 5 MG: 5 TABLET, FILM COATED ORAL at 08:37

## 2023-08-02 RX ADMIN — OXYCODONE HYDROCHLORIDE 5 MG: 5 TABLET ORAL at 08:36

## 2023-08-02 RX ADMIN — ACETAMINOPHEN 650 MG: 325 TABLET ORAL at 02:46

## 2023-08-02 RX ADMIN — HEPARIN SODIUM 1650 UNITS/HR: 10000 INJECTION, SOLUTION INTRAVENOUS at 02:42

## 2023-08-02 ASSESSMENT — PAIN SCALES - GENERAL
PAINLEVEL_OUTOF10: 6
PAINLEVEL_OUTOF10: 5
PAINLEVEL_OUTOF10: 6

## 2023-08-02 ASSESSMENT — PAIN DESCRIPTION - ORIENTATION
ORIENTATION: RIGHT
ORIENTATION: LEFT
ORIENTATION: RIGHT

## 2023-08-02 ASSESSMENT — PAIN DESCRIPTION - LOCATION
LOCATION: FLANK
LOCATION: PENIS
LOCATION: ABDOMEN;BACK

## 2023-08-02 ASSESSMENT — PAIN DESCRIPTION - DESCRIPTORS
DESCRIPTORS: ACHING
DESCRIPTORS: ACHING

## 2023-08-02 NOTE — PROGRESS NOTES
Messaged provider: \"Preliminary results for bilat venous doppler are back. Do you want to wait for final result before I dc him? Tech Comments Right There is no evidence of deep or superficial venous thrombosis involving the right lower extremity. Left There is no evidence of deep or superficial venous thrombosis involving the left lower extremity. Incidental finding of pulsatile venous flow most commonly associated with fluid volume overload. There is no previous exam for comparison. \"    MD responded: \"OK to discharge, no need to wait for formal read. Thanks. \"

## 2023-08-02 NOTE — DISCHARGE SUMMARY
Discharge Summary    Name:  Olesya Nunez /Age/Sex: 1985 (42 y.o. male)   Admit Date: 2023  Discharge Date: 23   MRN & CSN:  [id number] & [id number] Encounter Date and Time 23 7:44 AM EDT    Attending:  Sylvia Dsouza MD Discharging Provider: Sylvia Dsouza MD       Hospital Course: The patient is a pleasant 42 Y M with a h/o ongoing cigarette smoking and marijuana use, former heroin abuse (he says he used to snort it years ago, never injected anything), complex cardiac disease, and myotonic dystrophy. He was incarcerated for years on drug charges, just got out a couple months ago, has been staying [place] some people\" in their apartment, is not working. He says he has continued to abstain from opioids since being released from longterm. He is able to walk with a walker, manages to navigate stairs. He had been feeling fairly normal until yesterday, when he developed severe R flank / R abdominal pain. It continued to worsen so he came to the ED. Labs and vitals were fairly unremarkable, but CT showed a large R renal infarct and a possible thrombus of his R renal artery. There was also a splenic infarct. His sigmoid colon looked nonspecifically inflamed and radiology felt that ischemia was a possibility there as well. He isn't having any diarrhea or hematochezia                On chart review I see that the patient has a h/o Atrial flutter. He had a pacer placed in  because of AV block, and the atrial flutter was seen on a pacer interrogation. As of a couple years ago he was supposed to be on warfarin through [facility] cardiology, but the patient says that he stopped taking this while in longterm because \"I felt like I didn't need it. 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   This finding has been linked to myotonic

## 2023-08-02 NOTE — PROGRESS NOTES
Received patient on bed, alert and oriented x4. Vitals are stable. On PRN analgesia for pain control. Uses urinal for elimination. Walker x 1 to the bathroom. On heparin drip, adjusted dose based on anti-Xa. On telemetry monitoring. Call bell within reach. Bed alarm on. Maintained a calm and comfortable environment. Shift assessment updated and documented.

## 2023-08-02 NOTE — PLAN OF CARE
Problem: Safety - Adult  Goal: Free from fall injury  8/1/2023 2012 by Nita Cochran RN  Outcome: Progressing  Flowsheets (Taken 8/1/2023 2012)  Free From Fall Injury: Instruct family/caregiver on patient safety     Problem: Pain  Goal: Verbalizes/displays adequate comfort level or baseline comfort level  8/1/2023 2012 by Nita Cochran RN  Outcome: Not Progressing  Flowsheets (Taken 8/1/2023 2012)  Verbalizes/displays adequate comfort level or baseline comfort level:   Encourage patient to monitor pain and request assistance   Assess pain using appropriate pain scale   Administer analgesics based on type and severity of pain and evaluate response   Implement non-pharmacological measures as appropriate and evaluate response  8/1/2023 1047 by Brenda Melo RN  Flowsheets (Taken 8/1/2023 1047)  Verbalizes/displays adequate comfort level or baseline comfort level:   Encourage patient to monitor pain and request assistance   Administer analgesics based on type and severity of pain and evaluate response   Assess pain using appropriate pain scale

## 2023-08-02 NOTE — DISCHARGE INSTRUCTIONS
Follow up with PCP within 1-2 weeks so that you can get a referral for a colonoscopy (your colon looked a little too thick on CT scan and this needs to get checked out.)  Please quit smoking. Follow up with an electrophysiologist (pacemaker cardiologist) to consider having your pacemaker upgraded to a double-sided pacemaker so that we can get both sides of your heart squeezing at closer to the same time.

## 2023-08-02 NOTE — CARE COORDINATION
Chart reviewed d/c order noted. Spoke with patient. Stated he is considering [address] admission after our discussion yesterday. But only if he can go home with a few days. When told he would need to transition from this level of care to that level of care, declined. Stated his niece will be able to pick him up later today. No further CM needs.  YUMIKO Frias RN
Chart reviewed. Therapy with recs for IPREHAB. Spoke with marly Minor to speak with patient. Discussed with patient and his niece level of care, locations and likely duration. Declined ARU level of care. Plan to d/c home with support of friends.  Asher Tinajero RN
Patient Representative Agree with the Discharge Plan?       Cali Hernández, RN  Case Management Department

## 2023-08-02 NOTE — PROGRESS NOTES
Pt with active discharge order. RN went over discharge instructions with patient, pt and pt's family states understanding. IV and telemetry removed. Pt with no questions or concerns voiced to RN at this time. Medications being sent with patient:  Apixiban, metoprolol, nicotine patch, oxycodone, and entresto. Pt states understanding on importance of taking medications as ordered. NO further questions or concerns voiced to this RN at this time.

## 2023-08-02 NOTE — PROGRESS NOTES
Pharmacy Note  Warfarin Consult  Dx: paroxysmal Aflutter   Goal INR range 2-3   Home Warfarin dose: New restart   New start Warfarin with Heparin bridge. Would recommend continue bridge until INR therapeutic. Date                 INR                  Warfarin   7/31                1.21                     5 mg   8/1                  1.35                     5 mg   8/2                  1.86                   2.5 mg    Recommend Warfarin 2.5 mg tonight x1. Daily INR ordered. Rx will continue to manage therapy per consult order.     Madelin Whelan, PharmD 8/2/2023 6:38 AM

## 2023-08-04 LAB
BACTERIA BLD CULT ORG #2: NORMAL
BACTERIA BLD CULT: NORMAL

## 2023-08-14 ENCOUNTER — HOSPITAL ENCOUNTER (OUTPATIENT)
Dept: ULTRASOUND IMAGING | Age: 38
Discharge: HOME | End: 2023-08-14
Attending: FAMILY MEDICINE
Payer: MEDICAID

## 2023-08-14 DIAGNOSIS — E27.8 LEFT ADRENAL MASS (HCC): ICD-10-CM

## 2023-08-14 PROCEDURE — 76770 US EXAM ABDO BACK WALL COMP: CPT

## 2023-09-01 ENCOUNTER — OFFICE VISIT (OUTPATIENT)
Dept: CARDIOLOGY CLINIC | Age: 38
End: 2023-09-01
Payer: MEDICAID

## 2023-09-01 VITALS — HEART RATE: 68 BPM | OXYGEN SATURATION: 99 % | SYSTOLIC BLOOD PRESSURE: 90 MMHG | DIASTOLIC BLOOD PRESSURE: 56 MMHG

## 2023-09-01 DIAGNOSIS — I44.2 COMPLETE HEART BLOCK (HCC): ICD-10-CM

## 2023-09-01 DIAGNOSIS — Z95.0 PRESENCE OF PERMANENT CARDIAC PACEMAKER: ICD-10-CM

## 2023-09-01 DIAGNOSIS — I48.11 LONGSTANDING PERSISTENT ATRIAL FIBRILLATION (HCC): ICD-10-CM

## 2023-09-01 DIAGNOSIS — I42.8 NONISCHEMIC CARDIOMYOPATHY (HCC): ICD-10-CM

## 2023-09-01 DIAGNOSIS — G71.00 MUSCULAR DYSTROPHY (HCC): ICD-10-CM

## 2023-09-01 PROCEDURE — 99214 OFFICE O/P EST MOD 30 MIN: CPT | Performed by: NURSE PRACTITIONER

## 2023-09-01 RX ORDER — METOPROLOL SUCCINATE 25 MG/1
25 TABLET, EXTENDED RELEASE ORAL DAILY
Qty: 90 TABLET | Refills: 3 | Status: SHIPPED | OUTPATIENT
Start: 2023-09-01

## 2023-09-01 NOTE — PROGRESS NOTES
[address] [facility]   Cardiac Evaluation    Primary Care Doctor:  Oliver Schafer MD    Chief Complaint   Patient presents with    Follow-Up from Hospital    Cardiomyopathy        Assessment:    1. Nonischemic cardiomyopathy ([address])    2. Longstanding persistent atrial fibrillation ([address])    3. Complete heart block ([address])    4. Presence of permanent cardiac pacemaker    5. Muscular dystrophy ([address])        Plan:   No change in current heart medicines  Follow up with me in 4-6 weeks - will consider adjusting medicines for weak heart  Will plan to recheck heart function by echocardiogram after been on medicine therapy for 3 months     Vitals:    09/01/23 1158 09/01/23 1201   BP: (!) 90/54 (!) 90/56   Pulse: 68    SpO2: 99%        Primary Cardiologist: Dr. Francisco Gramajo      History of Present Illness:   I had the pleasure of seeing Milan Obrien (42 y.o.) in follow up for recent hospitalization. He presented with right flank pain. He was found to have right renal infarct and splenic infarct felt to be embolic. He has a known history of nonischemic cardiomyopathy, complete heart block s/p permanent pacemaker, atrial fibrillation, muscular dystrophy, drug use. He had not been on any of his medications since he was released from [facility] months ago. He was started on anticoagulation for A-fib flutter and embolic renal and splenic infarcts during hospitalization. Still with some flank pain. Had bright red blood per rectum once after discharge. None recently. No further shortness of breath, breathing is a lot better. He uses a walker at home. He is able to walk up the steps without dyspnea on exertion. He is in a wheelchair here. His appetite is fair. Comes and goes. Feels full or bloated at times. Has a lot of diarrhea. Not as bad lately. Medicines covered by insurance. He was seen in the MD clinic earlier this week. Was having abdominal pain that was associated with diaphoresis.      Nettie Phan

## 2023-09-01 NOTE — PATIENT INSTRUCTIONS
No change in current heart medicines  Follow up with me in 4-6 weeks - will consider adjusting medicines for weak heart  Will plan to recheck heart function by echocardiogram after been on medicine therapy for 3 months

## 2023-09-08 ENCOUNTER — TELEPHONE (OUTPATIENT)
Dept: CARDIOLOGY CLINIC | Age: 38
End: 2023-09-08

## 2023-09-08 DIAGNOSIS — G71.00 MUSCULAR DYSTROPHY (HCC): ICD-10-CM

## 2023-09-08 DIAGNOSIS — I44.2 COMPLETE HEART BLOCK (HCC): ICD-10-CM

## 2023-09-08 DIAGNOSIS — I42.8 NONISCHEMIC CARDIOMYOPATHY (HCC): Primary | ICD-10-CM

## 2023-09-08 DIAGNOSIS — I50.22 CHRONIC SYSTOLIC CONGESTIVE HEART FAILURE (HCC): ICD-10-CM

## 2023-09-08 DIAGNOSIS — Z95.0 PRESENCE OF PERMANENT CARDIAC PACEMAKER: ICD-10-CM

## 2023-09-08 DIAGNOSIS — I48.11 LONGSTANDING PERSISTENT ATRIAL FIBRILLATION (HCC): ICD-10-CM

## 2023-09-15 ENCOUNTER — TELEPHONE (OUTPATIENT)
Dept: CARDIOLOGY CLINIC | Age: 38
End: 2023-09-15

## 2023-09-18 NOTE — TELEPHONE ENCOUNTER
Submitted PA for ENTRESTO  [facility] Key: [id number] STATUS: PENDING. Follow up done daily; if no response in three days we will refax for status check. If another three days goes by with no response we will call the insurance for status.

## 2023-09-19 ENCOUNTER — HOSPITAL ENCOUNTER (OUTPATIENT)
Age: 38
Discharge: HOME | End: 2023-09-19
Payer: MEDICAID

## 2023-09-19 ENCOUNTER — HOSPITAL ENCOUNTER (OUTPATIENT)
Dept: GENERAL RADIOLOGY | Age: 38
Discharge: HOME | End: 2023-09-19
Payer: MEDICAID

## 2023-09-19 DIAGNOSIS — M79.672 LEFT FOOT PAIN: ICD-10-CM

## 2023-09-19 PROCEDURE — 73620 X-RAY EXAM OF FOOT: CPT

## 2023-10-24 ENCOUNTER — OFFICE VISIT (OUTPATIENT)
Dept: CARDIOLOGY CLINIC | Age: 38
End: 2023-10-24

## 2023-10-24 VITALS
SYSTOLIC BLOOD PRESSURE: 112 MMHG | HEIGHT: 65 IN | BODY MASS INDEX: 24.16 KG/M2 | HEART RATE: 79 BPM | OXYGEN SATURATION: 96 % | WEIGHT: 145 LBS | DIASTOLIC BLOOD PRESSURE: 86 MMHG

## 2023-10-24 DIAGNOSIS — I48.11 LONGSTANDING PERSISTENT ATRIAL FIBRILLATION (HCC): ICD-10-CM

## 2023-10-24 DIAGNOSIS — Z95.0 PRESENCE OF PERMANENT CARDIAC PACEMAKER: ICD-10-CM

## 2023-10-24 DIAGNOSIS — I44.2 COMPLETE HEART BLOCK (HCC): ICD-10-CM

## 2023-10-24 DIAGNOSIS — G71.00 MUSCULAR DYSTROPHY (HCC): ICD-10-CM

## 2023-10-24 DIAGNOSIS — D73.5 SPLENIC INFARCT: ICD-10-CM

## 2023-10-24 DIAGNOSIS — I50.22 CHRONIC SYSTOLIC CONGESTIVE HEART FAILURE (HCC): ICD-10-CM

## 2023-10-24 DIAGNOSIS — I42.8 NONISCHEMIC CARDIOMYOPATHY (HCC): Primary | ICD-10-CM

## 2023-10-24 DIAGNOSIS — N28.0 RENAL INFARCT (HCC): ICD-10-CM

## 2023-10-24 NOTE — PROGRESS NOTES
[address] [facility]   Cardiac Evaluation    Primary Care Doctor:  Germaine Singh MD    Chief Complaint   Patient presents with    Follow-up    Atrial Fibrillation        Assessment:    1. Nonischemic cardiomyopathy ([address])    2. Chronic systolic congestive heart failure ([address])    3. Longstanding persistent atrial fibrillation ([address])    4. Complete heart block ([address])    5. Presence of permanent cardiac pacemaker    6. Muscular dystrophy ([address])    7. Renal infarct ([address])    8. Splenic infarct        Plan:   Decrease the metoprolol (Toprol XL) 25 mg to once daily  Increase the Entresto 24/26 mg to 1 and 1/2 tablets twice daily  Continue the Eliquis  Will arrange evaluation by pacemaker physician (electrophysiologist) - ? Upgrade to ICD  Follow up with me in 3 months  Will plan to repeat echocardiogram to evaluate heart function after next visit    Vitals:    10/24/23 1139   BP: 112/86   Pulse: 79   SpO2: 96%   Weight: 65.8 kg (145 lb)   Height: 1.651 m (5' 5\")       Primary Cardiologist: Dr. Kerrie Amaya      History of Present Illness:   I had the pleasure of seeing Nely Benson (42 y.o.) in follow up for nonischemic cardiomyopathy, complete heart block s/p permanent pacemaker, atrial fibrillation as well as renal and splenic infarcts on anticoagulation, muscular dystrophy, hx of drug use. He is doing fair. No further abdominal/ flank pain. Tolerating medicines okay. He has some ED that just started recently. He reports now taking the metoprolol twice daily. He has some bleeding in stool, unsure if related to hemorrhoids, not every time. He notes he has less energy, more fatigue since medicine was increased to twice daily. Nely Benson describes symptoms including fatigue but denies chest pain, dyspnea, palpitations, orthopnea, PND, early saiety, edema, syncope. NYHA:   II  ACC/ AHA Stage:    C    Past Medical History:   has a past medical history of Muscular dystrophy ([address]   Surgical History:   has

## 2023-10-24 NOTE — PATIENT INSTRUCTIONS
Decrease the metoprolol (Toprol XL) 25 mg to once daily  Increase the Entresto 24/26 mg to 1 and 1/2 tablets twice daily  Continue the Eliquis  Will arrange evaluation by pacemaker physician (electrophysiologist)  Follow up with me in 3 months  Will plan to repeat echocardiogram to evaluate heart function after next visit

## 2023-11-25 ENCOUNTER — HOSPITAL ENCOUNTER (INPATIENT)
Age: 38
LOS: 6 days | Discharge: HOME | End: 2023-12-01
Attending: EMERGENCY MEDICINE | Admitting: INTERNAL MEDICINE
Payer: MEDICAID

## 2023-11-25 ENCOUNTER — APPOINTMENT (OUTPATIENT)
Dept: GENERAL RADIOLOGY | Age: 38
End: 2023-11-25
Payer: MEDICAID

## 2023-11-25 DIAGNOSIS — R79.89 ELEVATED TROPONIN: ICD-10-CM

## 2023-11-25 DIAGNOSIS — I25.5 ISCHEMIC CARDIOMYOPATHY: ICD-10-CM

## 2023-11-25 DIAGNOSIS — R07.9 CHEST PAIN, UNSPECIFIED TYPE: ICD-10-CM

## 2023-11-25 DIAGNOSIS — Z95.810 PRESENCE OF CARDIAC RESYNCHRONIZATION THERAPY DEFIBRILLATOR (CRT-D): ICD-10-CM

## 2023-11-25 DIAGNOSIS — I47.20 VENTRICULAR TACHYARRHYTHMIA (HCC): Primary | ICD-10-CM

## 2023-11-25 PROBLEM — I51.7 LEFT ATRIAL ENLARGEMENT: Status: ACTIVE | Noted: 2023-11-25

## 2023-11-25 PROBLEM — Q21.12 PFO (PATENT FORAMEN OVALE): Status: ACTIVE | Noted: 2023-11-25

## 2023-11-25 PROBLEM — I27.20 PULMONARY HTN (HCC): Status: ACTIVE | Noted: 2023-11-25

## 2023-11-25 PROBLEM — R09.89 PULMONARY VENOUS CONGESTION: Status: ACTIVE | Noted: 2023-11-25

## 2023-11-25 PROBLEM — R29.818 SUSPECTED SLEEP APNEA: Status: ACTIVE | Noted: 2023-11-25

## 2023-11-25 PROBLEM — F17.200 CURRENT SMOKER: Status: ACTIVE | Noted: 2023-11-25

## 2023-11-25 LAB
AMPHETAMINES UR QL SCN>1000 NG/ML: ABNORMAL
ANION GAP SERPL CALCULATED.3IONS-SCNC: 12 MMOL/L (ref 3–16)
BARBITURATES UR QL SCN>200 NG/ML: ABNORMAL
BASE EXCESS BLDV CALC-SCNC: -6.9 MMOL/L (ref -3–3)
BASOPHILS # BLD: 0.1 K/UL (ref 0–0.2)
BASOPHILS NFR BLD: 0.9 %
BENZODIAZ UR QL SCN>200 NG/ML: ABNORMAL
BUN SERPL-MCNC: 24 MG/DL (ref 7–20)
CALCIUM SERPL-MCNC: 9 MG/DL (ref 8.3–10.6)
CANNABINOIDS UR QL SCN>50 NG/ML: POSITIVE
CHLORIDE SERPL-SCNC: 106 MMOL/L (ref 99–110)
CO2 BLDV-SCNC: 23 MMOL/L
CO2 SERPL-SCNC: 22 MMOL/L (ref 21–32)
COCAINE UR QL SCN: ABNORMAL
COHGB MFR BLDV: 2.1 % (ref 0–1.5)
CREAT SERPL-MCNC: 0.9 MG/DL (ref 0.9–1.3)
DEPRECATED RDW RBC AUTO: 14.8 % (ref 12.4–15.4)
DRUG SCREEN COMMENT UR-IMP: ABNORMAL
EKG ATRIAL RATE: 139 BPM
EKG DIAGNOSIS: NORMAL
EKG Q-T INTERVAL: 294 MS
EKG QRS DURATION: 160 MS
EKG QTC CALCULATION (BAZETT): 447 MS
EKG R AXIS: 264 DEGREES
EKG T AXIS: 122 DEGREES
EKG VENTRICULAR RATE: 139 BPM
EOSINOPHIL # BLD: 0.1 K/UL (ref 0–0.6)
EOSINOPHIL NFR BLD: 1.1 %
FENTANYL SCREEN, URINE: ABNORMAL
GFR SERPLBLD CREATININE-BSD FMLA CKD-EPI: >60 ML/MIN/{1.73_M2}
GLUCOSE SERPL-MCNC: 133 MG/DL (ref 70–99)
HCO3 BLDV-SCNC: 21 MMOL/L (ref 23–29)
HCT VFR BLD AUTO: 46.7 % (ref 40.5–52.5)
HGB BLD-MCNC: 15.3 G/DL (ref 13.5–17.5)
LYMPHOCYTES # BLD: 1.2 K/UL (ref 1–5.1)
LYMPHOCYTES NFR BLD: 10.5 %
MAGNESIUM SERPL-MCNC: 2.2 MG/DL (ref 1.8–2.4)
MCH RBC QN AUTO: 28.7 PG (ref 26–34)
MCHC RBC AUTO-ENTMCNC: 32.7 G/DL (ref 31–36)
MCV RBC AUTO: 87.6 FL (ref 80–100)
METHADONE UR QL SCN>300 NG/ML: ABNORMAL
METHGB MFR BLDV: 0.4 %
MONOCYTES # BLD: 0.4 K/UL (ref 0–1.3)
MONOCYTES NFR BLD: 3.2 %
NEUTROPHILS # BLD: 9.3 K/UL (ref 1.7–7.7)
NEUTROPHILS NFR BLD: 84.3 %
NT-PROBNP SERPL-MCNC: 4363 PG/ML (ref 0–124)
O2 THERAPY: ABNORMAL
OPIATES UR QL SCN>300 NG/ML: ABNORMAL
OXYCODONE UR QL SCN: ABNORMAL
PCO2 BLDV: 50.4 MMHG (ref 40–50)
PCP UR QL SCN>25 NG/ML: ABNORMAL
PH BLDV: 7.24 [PH] (ref 7.35–7.45)
PH UR STRIP: 6 [PH]
PLATELET # BLD AUTO: 193 K/UL (ref 135–450)
PMV BLD AUTO: 8.6 FL (ref 5–10.5)
PO2 BLDV: 32.5 MMHG (ref 25–40)
POTASSIUM SERPL-SCNC: 4.1 MMOL/L (ref 3.5–5.1)
RBC # BLD AUTO: 5.33 M/UL (ref 4.2–5.9)
SAO2 % BLDV: 55 %
SODIUM SERPL-SCNC: 140 MMOL/L (ref 136–145)
TROPONIN, HIGH SENSITIVITY: 541 NG/L (ref 0–22)
TROPONIN, HIGH SENSITIVITY: 552 NG/L (ref 0–22)
WBC # BLD AUTO: 11.1 K/UL (ref 4–11)

## 2023-11-25 PROCEDURE — 2580000003 HC RX 258: Performed by: EMERGENCY MEDICINE

## 2023-11-25 PROCEDURE — 6360000002 HC RX W HCPCS: Performed by: INTERNAL MEDICINE

## 2023-11-25 PROCEDURE — 2060000000 HC ICU INTERMEDIATE R&B

## 2023-11-25 PROCEDURE — 82803 BLOOD GASES ANY COMBINATION: CPT

## 2023-11-25 PROCEDURE — 85025 COMPLETE CBC W/AUTO DIFF WBC: CPT

## 2023-11-25 PROCEDURE — 80307 DRUG TEST PRSMV CHEM ANLYZR: CPT

## 2023-11-25 PROCEDURE — 99255 IP/OBS CONSLTJ NEW/EST HI 80: CPT | Performed by: INTERNAL MEDICINE

## 2023-11-25 PROCEDURE — 96365 THER/PROPH/DIAG IV INF INIT: CPT

## 2023-11-25 PROCEDURE — 96375 TX/PRO/DX INJ NEW DRUG ADDON: CPT

## 2023-11-25 PROCEDURE — 71045 X-RAY EXAM CHEST 1 VIEW: CPT

## 2023-11-25 PROCEDURE — C8929 TTE W OR WO FOL WCON,DOPPLER: HCPCS

## 2023-11-25 PROCEDURE — 2580000003 HC RX 258: Performed by: INTERNAL MEDICINE

## 2023-11-25 PROCEDURE — 93306 TTE W/DOPPLER COMPLETE: CPT

## 2023-11-25 PROCEDURE — 6360000004 HC RX CONTRAST MEDICATION: Performed by: INTERNAL MEDICINE

## 2023-11-25 PROCEDURE — 6360000002 HC RX W HCPCS

## 2023-11-25 PROCEDURE — 83880 ASSAY OF NATRIURETIC PEPTIDE: CPT

## 2023-11-25 PROCEDURE — 6360000002 HC RX W HCPCS: Performed by: EMERGENCY MEDICINE

## 2023-11-25 PROCEDURE — 84484 ASSAY OF TROPONIN QUANT: CPT

## 2023-11-25 PROCEDURE — 99291 CRITICAL CARE FIRST HOUR: CPT | Performed by: INTERNAL MEDICINE

## 2023-11-25 PROCEDURE — 93010 ELECTROCARDIOGRAM REPORT: CPT | Performed by: INTERNAL MEDICINE

## 2023-11-25 PROCEDURE — 99285 EMERGENCY DEPT VISIT HI MDM: CPT

## 2023-11-25 PROCEDURE — 83735 ASSAY OF MAGNESIUM: CPT

## 2023-11-25 PROCEDURE — 6370000000 HC RX 637 (ALT 250 FOR IP): Performed by: NURSE PRACTITIONER

## 2023-11-25 PROCEDURE — 93005 ELECTROCARDIOGRAM TRACING: CPT | Performed by: EMERGENCY MEDICINE

## 2023-11-25 PROCEDURE — 80048 BASIC METABOLIC PNL TOTAL CA: CPT

## 2023-11-25 RX ORDER — PANTOPRAZOLE SODIUM 40 MG/1
40 TABLET, DELAYED RELEASE ORAL
Status: DISCONTINUED | OUTPATIENT
Start: 2023-11-26 | End: 2023-12-01 | Stop reason: HOSPADM

## 2023-11-25 RX ORDER — ENOXAPARIN SODIUM 100 MG/ML
1 INJECTION SUBCUTANEOUS 2 TIMES DAILY
Status: DISCONTINUED | OUTPATIENT
Start: 2023-11-25 | End: 2023-11-28

## 2023-11-25 RX ORDER — ACETAMINOPHEN 650 MG/1
650 SUPPOSITORY RECTAL EVERY 6 HOURS PRN
Status: DISCONTINUED | OUTPATIENT
Start: 2023-11-25 | End: 2023-11-25

## 2023-11-25 RX ORDER — POTASSIUM CHLORIDE 7.45 MG/ML
10 INJECTION INTRAVENOUS PRN
Status: DISCONTINUED | OUTPATIENT
Start: 2023-11-25 | End: 2023-11-27

## 2023-11-25 RX ORDER — SODIUM CHLORIDE 9 MG/ML
INJECTION, SOLUTION INTRAVENOUS PRN
Status: DISCONTINUED | OUTPATIENT
Start: 2023-11-25 | End: 2023-12-01 | Stop reason: HOSPADM

## 2023-11-25 RX ORDER — ONDANSETRON 2 MG/ML
4 INJECTION INTRAMUSCULAR; INTRAVENOUS ONCE
Status: COMPLETED | OUTPATIENT
Start: 2023-11-25 | End: 2023-11-25

## 2023-11-25 RX ORDER — ACETAMINOPHEN 325 MG/1
650 TABLET ORAL EVERY 6 HOURS PRN
Status: DISCONTINUED | OUTPATIENT
Start: 2023-11-25 | End: 2023-11-25

## 2023-11-25 RX ORDER — ACETAMINOPHEN 650 MG/1
650 SUPPOSITORY RECTAL EVERY 6 HOURS PRN
Status: DISCONTINUED | OUTPATIENT
Start: 2023-11-25 | End: 2023-12-01 | Stop reason: HOSPADM

## 2023-11-25 RX ORDER — POTASSIUM CHLORIDE 20 MEQ/1
40 TABLET, EXTENDED RELEASE ORAL PRN
Status: DISCONTINUED | OUTPATIENT
Start: 2023-11-25 | End: 2023-11-27

## 2023-11-25 RX ORDER — MAGNESIUM SULFATE IN WATER 40 MG/ML
4000 INJECTION, SOLUTION INTRAVENOUS ONCE
Status: COMPLETED | OUTPATIENT
Start: 2023-11-25 | End: 2023-11-25

## 2023-11-25 RX ORDER — MAGNESIUM SULFATE IN WATER 40 MG/ML
2000 INJECTION, SOLUTION INTRAVENOUS PRN
Status: DISCONTINUED | OUTPATIENT
Start: 2023-11-25 | End: 2023-11-27

## 2023-11-25 RX ORDER — ONDANSETRON 4 MG/1
4 TABLET, ORALLY DISINTEGRATING ORAL EVERY 8 HOURS PRN
Status: DISCONTINUED | OUTPATIENT
Start: 2023-11-25 | End: 2023-12-01 | Stop reason: HOSPADM

## 2023-11-25 RX ORDER — SODIUM CHLORIDE 0.9 % (FLUSH) 0.9 %
5-40 SYRINGE (ML) INJECTION EVERY 12 HOURS SCHEDULED
Status: DISCONTINUED | OUTPATIENT
Start: 2023-11-25 | End: 2023-11-28 | Stop reason: SDUPTHER

## 2023-11-25 RX ORDER — ACETAMINOPHEN 325 MG/1
650 TABLET ORAL EVERY 6 HOURS PRN
Status: DISCONTINUED | OUTPATIENT
Start: 2023-11-25 | End: 2023-12-01 | Stop reason: HOSPADM

## 2023-11-25 RX ORDER — SODIUM CHLORIDE 9 MG/ML
INJECTION, SOLUTION INTRAVENOUS CONTINUOUS
Status: ACTIVE | OUTPATIENT
Start: 2023-11-25 | End: 2023-11-25

## 2023-11-25 RX ORDER — 0.9 % SODIUM CHLORIDE 0.9 %
1000 INTRAVENOUS SOLUTION INTRAVENOUS ONCE
Status: COMPLETED | OUTPATIENT
Start: 2023-11-25 | End: 2023-11-25

## 2023-11-25 RX ORDER — POLYETHYLENE GLYCOL 3350 17 G/17G
17 POWDER, FOR SOLUTION ORAL DAILY PRN
Status: DISCONTINUED | OUTPATIENT
Start: 2023-11-25 | End: 2023-12-01 | Stop reason: HOSPADM

## 2023-11-25 RX ORDER — ONDANSETRON 2 MG/ML
4 INJECTION INTRAMUSCULAR; INTRAVENOUS EVERY 6 HOURS PRN
Status: DISCONTINUED | OUTPATIENT
Start: 2023-11-25 | End: 2023-12-01 | Stop reason: HOSPADM

## 2023-11-25 RX ORDER — SODIUM CHLORIDE, SODIUM LACTATE, POTASSIUM CHLORIDE, AND CALCIUM CHLORIDE .6; .31; .03; .02 G/100ML; G/100ML; G/100ML; G/100ML
1000 INJECTION, SOLUTION INTRAVENOUS ONCE
Status: COMPLETED | OUTPATIENT
Start: 2023-11-25 | End: 2023-11-25

## 2023-11-25 RX ORDER — ONDANSETRON 2 MG/ML
INJECTION INTRAMUSCULAR; INTRAVENOUS
Status: COMPLETED
Start: 2023-11-25 | End: 2023-11-25

## 2023-11-25 RX ORDER — SODIUM CHLORIDE 0.9 % (FLUSH) 0.9 %
5-40 SYRINGE (ML) INJECTION PRN
Status: DISCONTINUED | OUTPATIENT
Start: 2023-11-25 | End: 2023-11-28 | Stop reason: SDUPTHER

## 2023-11-25 RX ORDER — SODIUM CHLORIDE 9 MG/ML
1000 INJECTION, SOLUTION INTRAVENOUS CONTINUOUS
Status: DISCONTINUED | OUTPATIENT
Start: 2023-11-25 | End: 2023-11-25

## 2023-11-25 RX ADMIN — ONDANSETRON 4 MG: 2 INJECTION INTRAMUSCULAR; INTRAVENOUS at 09:05

## 2023-11-25 RX ADMIN — ENOXAPARIN SODIUM 70 MG: 100 INJECTION SUBCUTANEOUS at 22:03

## 2023-11-25 RX ADMIN — AMIODARONE HYDROCHLORIDE 150 MG: 50 INJECTION, SOLUTION INTRAVENOUS at 08:29

## 2023-11-25 RX ADMIN — AMIODARONE HYDROCHLORIDE 0.5 MG/MIN: 50 INJECTION, SOLUTION INTRAVENOUS at 16:39

## 2023-11-25 RX ADMIN — PERFLUTREN 1.5 ML: 6.52 INJECTION, SUSPENSION INTRAVENOUS at 12:38

## 2023-11-25 RX ADMIN — MAGNESIUM SULFATE HEPTAHYDRATE 4000 MG: 40 INJECTION, SOLUTION INTRAVENOUS at 08:58

## 2023-11-25 RX ADMIN — ACETAMINOPHEN 650 MG: 325 TABLET ORAL at 22:43

## 2023-11-25 RX ADMIN — SODIUM CHLORIDE 1000 ML: 9 INJECTION, SOLUTION INTRAVENOUS at 09:22

## 2023-11-25 RX ADMIN — AMIODARONE HYDROCHLORIDE 0.5 MG/MIN: 50 INJECTION, SOLUTION INTRAVENOUS at 11:20

## 2023-11-25 RX ADMIN — AMIODARONE HYDROCHLORIDE 1 MG/MIN: 50 INJECTION, SOLUTION INTRAVENOUS at 08:42

## 2023-11-25 RX ADMIN — SODIUM CHLORIDE: 9 INJECTION, SOLUTION INTRAVENOUS at 11:38

## 2023-11-25 RX ADMIN — SODIUM CHLORIDE, POTASSIUM CHLORIDE, SODIUM LACTATE AND CALCIUM CHLORIDE 1000 ML: 600; 310; 30; 20 INJECTION, SOLUTION INTRAVENOUS at 10:21

## 2023-11-25 RX ADMIN — ENOXAPARIN SODIUM 70 MG: 100 INJECTION SUBCUTANEOUS at 11:39

## 2023-11-25 RX ADMIN — AMIODARONE HYDROCHLORIDE 0.5 MG/MIN: 50 INJECTION, SOLUTION INTRAVENOUS at 22:24

## 2023-11-25 ASSESSMENT — ENCOUNTER SYMPTOMS
SHORTNESS OF BREATH: 0
HEMATOCHEZIA: 0
STRIDOR: 0
RIGHT EYE: 0
LEFT EYE: 0
WHEEZING: 0
HEMATEMESIS: 0
ABDOMINAL PAIN: 1

## 2023-11-25 ASSESSMENT — PAIN SCALES - GENERAL
PAINLEVEL_OUTOF10: 4
PAINLEVEL_OUTOF10: 5

## 2023-11-25 ASSESSMENT — PAIN DESCRIPTION - ORIENTATION: ORIENTATION: MID

## 2023-11-25 ASSESSMENT — PAIN DESCRIPTION - LOCATION
LOCATION: CHEST;ABDOMEN
LOCATION: ABDOMEN

## 2023-11-25 ASSESSMENT — PAIN - FUNCTIONAL ASSESSMENT: PAIN_FUNCTIONAL_ASSESSMENT: 0-10

## 2023-11-25 ASSESSMENT — PAIN DESCRIPTION - DESCRIPTORS: DESCRIPTORS: BURNING

## 2023-11-25 NOTE — CONSULTS
4.1      CO2 22   BUN 24*   CREATININE 0.9       No results for input(s): \"INR\", \"PROTIME\" in the last 72 hours. No results for input(s): \"CKMB\", \"TROPONINI\" in the last 72 hours. Recent Labs     11/25/23  0814   PROBNP 4,363*       Imaging:    XR CHEST PORTABLE    Result Date: 11/25/2023  1. Cardiomegaly with mild pulmonary vascular congestion. Patient was seen in the ED. Due to the high probability of clinically significant life threating deterioration of the patient's condition, a total critical care time 35 minutes was used. This time excludes any time spent performing procedures. Time includes, but not limited to, review of vital signs, telemetry monitoring, continuous pulse oximety, IV medications, clinical response to medications and cardiovascular test results. It also includes documentation time, consultation time, interpretation of lab data, and review of nursing notes and available old records.       Signed by: Kyle Martins MD

## 2023-11-25 NOTE — CONSULTS
9894 - called [facility] per consult  Re: arrhythmia  0813 - Dr Nicci Lucero called back to speak with Dr Odalys Strauss

## 2023-11-25 NOTE — CONSULTS
INPATIENT PULMONARY CRITICAL CARE CONSULT NOTE      Chief Complaint/Referring Provider:  Patient is being seen at the request of Dr. Agnieszka Baldwin for a consultation for Ventricular arrhythmias with hypotension     Presenting HPI: Patient came to the hospital because of abdominal pain and chest pain    As per the ER provider-Alex Vicenta Pickens is a 42 y.o. male who presents For evaluation of chest pain. Patient has had intermittent chest pain over the past week. When patient was eval by EMS, he was found to have intermittent V. tach on telemetry. Patient does have past history of cardiomyopathy with atrial fibrillation, a flutter. He is on anticoagulation. Patient is not able to recall whether he has had other arrhythmias in the past.  EMS reported that his oxygen was 80% on room air and was placed on supplemental O2. They were not able to establish an IV in route. He has had chest discomfort. No new leg swelling. ED Course, and Reassessment: -     68-year-old male presenting for evaluation of chest pain, arrhythmia. Patient was noted to be in wide-complex tachydysrhythmia and rales. They are not able to establish IV access. Patient is intermittently in wide-complex tachycardia in the rate in the 140s to 150s. He does have history of ischemic cardiomyopathy and underlying atrial fibrillation with pacemaker in place. He is otherwise hemodynamically stable at this time. He is complaining of chest discomfort. IV access obtained via ultrasound access. Patient has been ordered 4 g magnesium as well as amiodarone bolus and infusion. Laboratory evaluation obtained. EKG otherwise does not show overt ischemic change. Have consulted electrophysiology who agreed with amiodarone administration. We will interrogate pacemaker for further investigation.   Patient will likely require admission for continued management of arrhythmia, cardiology evaluation    Patient was seen this morning in the ER states that he came to the

## 2023-11-25 NOTE — ED NOTES
Amiodarone restarted at 0.5mg per minute per Dr. Sandra Palma.      José Miguel Sweeney RN  11/25/23 5635

## 2023-11-25 NOTE — ED PROVIDER NOTES
[address] [place] [address]  ED  EMERGENCY DEPARTMENT ENCOUNTER        Patient Name: Cielo Deluna  MRN: [id number]  [address] 1985  Date of evaluation: 11/25/2023  Provider: Celso Angel MD  PCP: Doroteo Adams MD  Note Started: 8:37 AM EST 11/25/23    CHIEF COMPLAINT       Chest Pain (Patient called EMS for chest pain/abdominal pain over the last week. EMS reports patient has been intermittently in V-Tach in route to ED. EMS reports patient has a pace maker and history of muscular dystrophy. EMS reports patients O2 80% on RA upon their arrival, 94% on 4L nasal cannula. EMS did not give any medications in route to ED. )      HISTORY OF PRESENT ILLNESS: 1 or more Elements     History from : Patient and EMS    Limitations to history : None    Cielo Deluna is a 42 y.o. male who presents For evaluation of chest pain. Patient has had intermittent chest pain over the past week. When patient was eval by EMS, he was found to have intermittent V. tach on telemetry. Patient does have past history of cardiomyopathy with atrial fibrillation, a flutter. He is on anticoagulation. Patient is not able to recall whether he has had other arrhythmias in the past.  EMS reported that his oxygen was 80% on room air and was placed on supplemental O2. They were not able to establish an IV in route. He has had chest discomfort. No new leg swelling. Nursing Notes were all reviewed and agreed with or any disagreements were addressed in the HPI. REVIEW OF SYSTEMS :      Review of Systems    Positives and Pertinent negatives as per HPI.      SURGICAL HISTORY     Past Surgical History:   Procedure Laterality Date    PACEMAKER INSERTION         CURRENTMEDICATIONS       Previous Medications    APIXABAN (ELIQUIS) 5 MG TABS TABLET    Take 1 tablet by mouth 2 times daily    METOPROLOL SUCCINATE (TOPROL XL) 25 MG EXTENDED RELEASE TABLET    Take 1 tablet by mouth daily    NICOTINE (NICODERM CQ) 14 MG/24HR    Place 1 patch onto IntraVENous Stopped 11/25/23 1025)             Is this patient to be included in the SEP-1 Core Measure due to severe sepsis or septic shock? No   Exclusion criteria - the patient is NOT to be included for SEP-1 Core Measure due to: Alternative explanation for abnormal labs/vitals that do not relate to sepsis, see MDM for further explanation    CC/HPI Summary, DDx, ED Course, and Reassessment:     49-year-old male presenting for evaluation of chest pain, arrhythmia. Patient was noted to be in wide-complex tachydysrhythmia and rales. They are not able to establish IV access. Patient is intermittently in wide-complex tachycardia in the rate in the 140s to 150s. He does have history of ischemic cardiomyopathy and underlying atrial fibrillation with pacemaker in place. He is otherwise hemodynamically stable at this time. He is complaining of chest discomfort. IV access obtained via ultrasound access. Patient has been ordered 4 g magnesium as well as amiodarone bolus and infusion. Laboratory evaluation obtained. EKG otherwise does not show overt ischemic change. Have consulted electrophysiology who agreed with amiodarone administration. We will interrogate pacemaker for further investigation. Patient will likely require admission for continued management of arrhythmia, cardiology evaluation    The differential diagnosis associated with the patient's presentation includes, but is not limited to: Electrolyte abnormality, wide-complex tachycardia, ACS, ischemia,    CONSULTS: (Who and What was discussed)  IP CONSULT TO CARDIOLOGY    Discussion with Other Professionals : Admitting Team   and Consultant      Management of the patient was discussed with admitting physician. I discussed with EP (Dr. Rogelio Lazo) regarding arrythmia.     Social Determinants : None    Patient's care impacted by chronic condition(s):   Past Medical History:   Diagnosis Date    Muscular dystrophy ([address])          Records Reviewed :

## 2023-11-25 NOTE — H&P
V2.0  History and Physical      Name:  Shanta Ortez /Age/Sex: 1985  (42 y.o. male)   MRN & CSN:  [id number] & [id number] Encounter Date/Time: 2023 10:15 AM EST   Location:   PCP: Cassandra Castelan MD       Hospital Day: 1    Assessment and Plan:   Shanta Ortez is a 42 y.o. male with a pmh of non-ischemic cardiomyopathy, A-fib, pacemaker who presents with Ventricular tachycardia ([address])        Plan:  Ventricular tachycardia POA. Now off of amiodarone gtt. due to hypotension. EP has been consulted, electrolytes replaced will repeat in 6 hours. Will keep in ICU for close monitoring overnight. Elevated proBNP without obvious fluid overload likely due to V. tach/pulmonary edema. Elevated troponin 541-552 with EKG showing V. Tach. Cardiology/EP on board, additional cardiac workup per cardiology  Acute Hypoxic respiratory failure due to vascular congestion/flash pulm edema. On 4 L nasal cannula  Mixed respiratory and metabolic acidosis with pH of 7.23. Will check lactic acid  A-fib on Eliquis. Resumed  Ischemic cardiomyopathy will hold Entresto due to hypotension  Of complete heart block status post permanent pacemaker placement  PFO     A total of 48 minutes of critical care time were utilized towards the patient's case today. These were inclusive of, but not limited to, direct patient care, in-person coordination of care with nursing staff, gathering collateral history from family or significant others/entities, discussion/clarification of code status, and data review immediately outside the patient's room.       DVT prophylaxis with Eliquis  Full code    Disposition:       Personally reviewed Lab Studies and Imaging     Discussed management of the case with ER physician who recommended admission    EKG interpreted personally and results ventricular tachycardia    Imaging that was interpreted personally includes chest x-ray and results vascular congestion             History from:

## 2023-11-26 ENCOUNTER — APPOINTMENT (OUTPATIENT)
Dept: CT IMAGING | Age: 38
End: 2023-11-26
Payer: MEDICAID

## 2023-11-26 PROBLEM — F12.90 MARIJUANA USE: Status: ACTIVE | Noted: 2023-11-26

## 2023-11-26 PROBLEM — E87.20 NORMAL ANION GAP METABOLIC ACIDOSIS: Status: ACTIVE | Noted: 2023-11-26

## 2023-11-26 PROBLEM — I48.0 PAF (PAROXYSMAL ATRIAL FIBRILLATION) (HCC): Status: ACTIVE | Noted: 2023-11-26

## 2023-11-26 LAB
ANION GAP SERPL CALCULATED.3IONS-SCNC: 12 MMOL/L (ref 3–16)
BASE EXCESS BLDV CALC-SCNC: -6.8 MMOL/L (ref -3–3)
BASOPHILS # BLD: 0.1 K/UL (ref 0–0.2)
BASOPHILS NFR BLD: 0.9 %
BUN SERPL-MCNC: 19 MG/DL (ref 7–20)
CALCIUM SERPL-MCNC: 8.4 MG/DL (ref 8.3–10.6)
CHLORIDE SERPL-SCNC: 106 MMOL/L (ref 99–110)
CO2 BLDV-SCNC: 20 MMOL/L
CO2 SERPL-SCNC: 18 MMOL/L (ref 21–32)
COHGB MFR BLDV: 2.9 % (ref 0–1.5)
CREAT SERPL-MCNC: 0.8 MG/DL (ref 0.9–1.3)
DEPRECATED RDW RBC AUTO: 14.7 % (ref 12.4–15.4)
EOSINOPHIL # BLD: 0.1 K/UL (ref 0–0.6)
EOSINOPHIL NFR BLD: 1.8 %
GFR SERPLBLD CREATININE-BSD FMLA CKD-EPI: >60 ML/MIN/{1.73_M2}
GLUCOSE SERPL-MCNC: 104 MG/DL (ref 70–99)
HCO3 BLDV-SCNC: 18.7 MMOL/L (ref 23–29)
HCT VFR BLD AUTO: 45 % (ref 40.5–52.5)
HGB BLD-MCNC: 14.7 G/DL (ref 13.5–17.5)
LACTATE BLDV-SCNC: 2 MMOL/L (ref 0.4–2)
LYMPHOCYTES # BLD: 1.5 K/UL (ref 1–5.1)
LYMPHOCYTES NFR BLD: 21.6 %
MAGNESIUM SERPL-MCNC: 2.5 MG/DL (ref 1.8–2.4)
MCH RBC QN AUTO: 28.6 PG (ref 26–34)
MCHC RBC AUTO-ENTMCNC: 32.7 G/DL (ref 31–36)
MCV RBC AUTO: 87.5 FL (ref 80–100)
METHGB MFR BLDV: 0.1 %
MONOCYTES # BLD: 0.5 K/UL (ref 0–1.3)
MONOCYTES NFR BLD: 7.7 %
NEUTROPHILS # BLD: 4.8 K/UL (ref 1.7–7.7)
NEUTROPHILS NFR BLD: 68 %
NT-PROBNP SERPL-MCNC: 3625 PG/ML (ref 0–124)
O2 THERAPY: ABNORMAL
PCO2 BLDV: 37.6 MMHG (ref 40–50)
PH BLDV: 7.31 [PH] (ref 7.35–7.45)
PLATELET # BLD AUTO: 178 K/UL (ref 135–450)
PMV BLD AUTO: 8.9 FL (ref 5–10.5)
PO2 BLDV: 55 MMHG (ref 25–40)
POTASSIUM SERPL-SCNC: 4.3 MMOL/L (ref 3.5–5.1)
RBC # BLD AUTO: 5.14 M/UL (ref 4.2–5.9)
SAO2 % BLDV: 88 %
SODIUM SERPL-SCNC: 136 MMOL/L (ref 136–145)
WBC # BLD AUTO: 7 K/UL (ref 4–11)

## 2023-11-26 PROCEDURE — 83880 ASSAY OF NATRIURETIC PEPTIDE: CPT

## 2023-11-26 PROCEDURE — 82803 BLOOD GASES ANY COMBINATION: CPT

## 2023-11-26 PROCEDURE — 80048 BASIC METABOLIC PNL TOTAL CA: CPT

## 2023-11-26 PROCEDURE — 83605 ASSAY OF LACTIC ACID: CPT

## 2023-11-26 PROCEDURE — 99233 SBSQ HOSP IP/OBS HIGH 50: CPT | Performed by: INTERNAL MEDICINE

## 2023-11-26 PROCEDURE — 2060000000 HC ICU INTERMEDIATE R&B

## 2023-11-26 PROCEDURE — 2500000003 HC RX 250 WO HCPCS: Performed by: INTERNAL MEDICINE

## 2023-11-26 PROCEDURE — 36415 COLL VENOUS BLD VENIPUNCTURE: CPT

## 2023-11-26 PROCEDURE — 83735 ASSAY OF MAGNESIUM: CPT

## 2023-11-26 PROCEDURE — 6370000000 HC RX 637 (ALT 250 FOR IP): Performed by: INTERNAL MEDICINE

## 2023-11-26 PROCEDURE — 6360000002 HC RX W HCPCS: Performed by: INTERNAL MEDICINE

## 2023-11-26 PROCEDURE — 2580000003 HC RX 258: Performed by: INTERNAL MEDICINE

## 2023-11-26 PROCEDURE — 85025 COMPLETE CBC W/AUTO DIFF WBC: CPT

## 2023-11-26 PROCEDURE — 74176 CT ABD & PELVIS W/O CONTRAST: CPT

## 2023-11-26 RX ORDER — OXYCODONE HYDROCHLORIDE 5 MG/1
5 TABLET ORAL EVERY 6 HOURS PRN
Status: COMPLETED | OUTPATIENT
Start: 2023-11-26 | End: 2023-11-29

## 2023-11-26 RX ORDER — AMIODARONE HYDROCHLORIDE 200 MG/1
400 TABLET ORAL DAILY
Status: DISCONTINUED | OUTPATIENT
Start: 2023-11-26 | End: 2023-12-01 | Stop reason: HOSPADM

## 2023-11-26 RX ADMIN — ONDANSETRON 4 MG: 2 INJECTION INTRAMUSCULAR; INTRAVENOUS at 23:46

## 2023-11-26 RX ADMIN — ENOXAPARIN SODIUM 70 MG: 100 INJECTION SUBCUTANEOUS at 09:35

## 2023-11-26 RX ADMIN — OXYCODONE 5 MG: 5 TABLET ORAL at 09:35

## 2023-11-26 RX ADMIN — AMIODARONE HYDROCHLORIDE 400 MG: 200 TABLET ORAL at 14:14

## 2023-11-26 RX ADMIN — OXYCODONE 5 MG: 5 TABLET ORAL at 21:49

## 2023-11-26 RX ADMIN — AMIODARONE HYDROCHLORIDE 0.5 MG/MIN: 50 INJECTION, SOLUTION INTRAVENOUS at 04:03

## 2023-11-26 RX ADMIN — AMIODARONE HYDROCHLORIDE 0.5 MG/MIN: 50 INJECTION, SOLUTION INTRAVENOUS at 14:16

## 2023-11-26 RX ADMIN — Medication 10 ML: at 20:21

## 2023-11-26 RX ADMIN — ENOXAPARIN SODIUM 70 MG: 100 INJECTION SUBCUTANEOUS at 20:21

## 2023-11-26 RX ADMIN — SODIUM BICARBONATE 50 MEQ: 84 INJECTION, SOLUTION INTRAVENOUS at 11:26

## 2023-11-26 ASSESSMENT — ENCOUNTER SYMPTOMS
LEFT EYE: 0
HEMATOCHEZIA: 0
STRIDOR: 0
SHORTNESS OF BREATH: 0
HEMATEMESIS: 0
ABDOMINAL PAIN: 1
RIGHT EYE: 0
WHEEZING: 0

## 2023-11-26 ASSESSMENT — PAIN DESCRIPTION - ORIENTATION
ORIENTATION: MID;LEFT
ORIENTATION: MID;LEFT

## 2023-11-26 ASSESSMENT — PAIN DESCRIPTION - LOCATION
LOCATION: ABDOMEN

## 2023-11-26 ASSESSMENT — PAIN DESCRIPTION - DESCRIPTORS
DESCRIPTORS: SHARP
DESCRIPTORS: DISCOMFORT;PRESSURE;BURNING

## 2023-11-26 ASSESSMENT — PAIN SCALES - GENERAL
PAINLEVEL_OUTOF10: 10
PAINLEVEL_OUTOF10: 10
PAINLEVEL_OUTOF10: 0
PAINLEVEL_OUTOF10: 7

## 2023-11-26 NOTE — FLOWSHEET NOTE
11/26/23 0835   Assessment   Charting Type Shift assessment   Psychosocial   Psychosocial (WDL) WDL   Neurological   Neuro (WDL) X   Level of Consciousness 0   Orientation Level Oriented to person;Oriented to place;Oriented to time;Oriented to situation   Cognition Appropriate judgement; Appropriate safety awareness; Follows commands   Speech Clear   R Foot Dorsiflexion Moderate   L Foot Dorsiflexion Moderate   R Foot Plantar Flexion Moderate   L Foot Plantar Flexion Moderate   RUE Motor Response Responds to command   LUE Motor Response Responds to command   RLE Motor Response Responds to command   LLE Motor Response Responds to command   [place] Coma Scale   Eye Opening 4   Best Verbal Response 5   Best Motor Response 6   Mariela Coma Scale Score 15   Sensory   RUE Sensation  Numbness;Tingling   LUE Sensation  Numbness;Tingling   RLE Sensation  Numbness;Tingling   LLE Sensation  Numbness;Tingling   NIHSS Stroke Scale   NIHSS Stroke Scale Assessed No   HEENT (Head, Ears, Eyes, Nose, & Throat)   HEENT (WDL) WDL   Respiratory   Respiratory (WDL) X   Respiratory Pattern Regular   Respiratory Depth Normal   Respiratory Quality/Effort Unlabored   Chest Assessment Chest expansion symmetrical;Trachea midline   L Breath Sounds Diminished   R Breath Sounds Diminished   Cardiac   Cardiac (WDL) X   Cardiac Regularity Regular   Heart Sounds S1, S2   Cardiac Rhythm Sinus rhythm   Rhythm Interpretation   Pulse 64   Gastrointestinal   Abdominal (WDL) X   Abdomen Inspection Flat;Soft   RUQ Bowel Sounds Active   LUQ Bowel Sounds Active   RLQ Bowel Sounds Active   LLQ Bowel Sounds Active   GI Symptoms Reduction in appetite   Tenderness Soft;RLQ;LLQ;Tenderness   Genitourinary   Genitourinary (WDL) WDL   Suprapubic Tenderness No   Dysuria (Pain/Burning w/Urination) No   Peripheral Vascular   Peripheral Vascular (WDL) WDL   Skin Integumentary    Skin Integumentary (WDL) WDL   Musculoskeletal   Musculoskeletal (WDL) X   RL Extremity

## 2023-11-26 NOTE — PROGRESS NOTES
WBC 11.1* 7.0   HGB 15.3 14.7   HCT 46.7 45.0   MCV 87.6 87.5    178     BMP:   Recent Labs     11/25/23  0814 11/26/23  0450    136   K 4.1 4.3    106   CO2 22 18*   BUN 24* 19   CREATININE 0.9 0.8*     UA:  Recent Labs     11/25/23  1438   PHUR 6.0       Imaging:  I have reviewed radiology images personally. XR CHEST PORTABLE   Final Result   1. Cardiomegaly with mild pulmonary vascular congestion. XR CHEST PORTABLE    Result Date: 11/25/2023  EXAMINATION: ONE XRAY VIEW OF THE CHEST 11/25/2023 8:16 am COMPARISON: None available. HISTORY: ORDERING SYSTEM PROVIDED HISTORY: Chest Pain TECHNOLOGIST PROVIDED HISTORY: Reason for exam:->Chest Pain Reason for Exam: chest pains FINDINGS: There is mild pulmonary vascular congestion. The cardiac silhouette is enlarged status post dual lead pacemaker. There is no pneumothorax or pleural effusion. Status post cholecystectomy. 1. Cardiomegaly with mild pulmonary vascular congestion. Repeat echo-   Summary   The left ventricular systolic function is severely reduced with an ejection   fraction of <20%. The anterior wall, septal walls, and lateral walls are severely hypokinetic   to akinetic. Inferior walls are hypokinetic. Left ventricular cavity size is moderately dilated with normal left   ventricular wall thickness. Indeterminate diastolic function. Normal right ventricular size. Right ventricular systolic function is mildly reduced   Moderate Bi-atrial enlargement. Mild mitral regurgitation. There is some degree of posterior leaflet restriction resulting in an   eccentric posteriorly directed jet. This may be underestimated. Mild to moderate tricuspid regurgitation. Systolic pulmonic artery pressure (SPAP) is estimated at 54 mmHg consistent   with moderate pulmonary hypertension (Right atrial pressure of 8 mmHg).       Assessment:  Principal Problem:    Ventricular tachyarrhythmia (HCC)  Active Problems: Nonischemic cardiomyopathy (HCC)    Muscular dystrophy (HCC)    Biatrial enlargement    PFO (patent foramen ovale)    Pulmonary HTN (HCC)    Suspected sleep apnea    Elevated troponin    Pulmonary venous congestion    Current smoker    Marijuana use    Normal anion gap metabolic acidosis  Resolved Problems:    * No resolved hospital problems.  *          Plan:   Oxygen supplementation, if required, to keep saturation between 90 and 94% only  Please titrate oxygen as per the above parameters  Patient was on room air oxygen when seen  Patient's IV fluids were discontinued  Patient was getting amiodarone infusion and was in sinus rhythm and evaluated  EP follow up  Patient also has elevated troponin levels  Patient's Eliquis being changed to low monitor weight heparin by cardiology  Intervention team/cardiology to decide upon continuation of any antiplatelet therapy or statins  Clinically patient appears to have sleep apnea and will benefit from HST as an outpatient  Smoking may be counterproductive  Smoking cessation advised  Patient has abdominal discomfort and back pain and patient has been on marijuana at home and evaluation management as per IM   Patient has elevated BNP and also has improving BUN but patient has normal anion gap metabolic acidosis  Patient to be given 1 dose of sodium bicarbonate  IM/cardiology decide if patient will benefit from a dose of Lasix  Pulmonary patient will be if the patient wants  Trend the hemodynamics and cardiac rhythm closely  Nicotine replacement therapy if the patient wants  Monitor input output and BMP  Correct electrolytes on whenever necessary basis  Repeat echocardiogram reviewed  PUD prophylaxis      Case discussed with patient and nursing    Further management depending on patient's clinical status and the EP recommendations            Electronically signed by:  Hai Ngo MD    11/26/2023    10:44 AM.

## 2023-11-26 NOTE — PLAN OF CARE
Problem: Discharge Planning  Goal: Discharge to home or other facility with appropriate resources  Outcome: Progressing  Flowsheets (Taken 11/25/2023 2326)  Discharge to home or other facility with appropriate resources:   Identify barriers to discharge with patient and caregiver   Arrange for needed discharge resources and transportation as appropriate     Problem: Skin/Tissue Integrity  Goal: Absence of new skin breakdown  Description: 1. Monitor for areas of redness and/or skin breakdown  2. Assess vascular access sites hourly  3. Every 4-6 hours minimum:  Change oxygen saturation probe site  4. Every 4-6 hours:  If on nasal continuous positive airway pressure, respiratory therapy assess nares and determine need for appliance change or resting period.   Outcome: Progressing  Note: Assess skin per unit guidelines and PRN     Problem: Safety - Adult  Goal: Free from fall injury  Outcome: Progressing     Problem: Pain  Goal: Verbalizes/displays adequate comfort level or baseline comfort level  Outcome: Progressing  Flowsheets (Taken 11/25/2023 2326)  Verbalizes/displays adequate comfort level or baseline comfort level:   Encourage patient to monitor pain and request assistance   Assess pain using appropriate pain scale

## 2023-11-26 NOTE — PROGRESS NOTES
Evita Toro MD    Hospitalist Progress Note      Name:  Maribel Huynh /Age/Sex: 1985  (42 y.o. male)   MRN & CSN:  [id number] & [id number] Admission Date/Time: 2023  7:50 AM   Location:  0100/0338-63 PCP: Darrel Arrington MD       I saw and examined the patient on 2023 at 9:21 AM.    Hospital Day: 2  Barriers to discharge:   Assessment and Plan:      42 y.o. male with a pmh of non-ischemic cardiomyopathy, A-fib, pacemaker who presents with Ventricular tachycardia (HCC)         Ventricular tachycardia POA. On amiodarone gtt. this morning. EP has been consulted, electrolytes replaced will repeat in 6 hours. Noted EP plan tostart oral amnio, continue gtt. overnight at 0.5, echo with plan to upgrade to biventricular ICD on Tuesday  Elevated proBNP without obvious fluid overload likely due to V. tach/pulmonary edema. Elevated troponin 541-552 with EKG showing V. Tach. Cardiology/EP on board, additional cardiac workup per cardiology  Acute Hypoxic respiratory failure due to vascular congestion/flash pulm edema. On 4 L nasal cannula, improving  Mixed respiratory and metabolic acidosis with pH of 7.23. Will check lactic acid  A-fib on Eliquis. Resumed  Ischemic cardiomyopathy will hold Entresto due to hypotension  Of complete heart block status post permanent pacemaker placement  PFO  Muscular dystrophy at baseline    Therapeutic Lovenox for DVT prophylaxis  Full code     Subjective:     Patient seen and examined at bedside. Laying comfortably in bed not in acute distress. No acute events reported overnight. Patient denies any chest pain, fever, headache, shortness of breath, abdominal pain, nausea or vomiting, diarrhea or new extremity weakness    Objective:      Intake/Output Summary (Last 24 hours) at 2023 0921  Last data filed at 2023 0622  Gross per 24 hour   Intake 180 ml   Output 250 ml   Net -70 ml      Vitals:   Vitals:    23 0835   BP: 117/81   Pulse: 64   Resp:

## 2023-11-27 ENCOUNTER — TELEPHONE (OUTPATIENT)
Dept: PULMONOLOGY | Age: 38
End: 2023-11-27

## 2023-11-27 LAB
ANION GAP SERPL CALCULATED.3IONS-SCNC: 12 MMOL/L (ref 3–16)
BUN SERPL-MCNC: 20 MG/DL (ref 7–20)
CALCIUM SERPL-MCNC: 8.4 MG/DL (ref 8.3–10.6)
CHLORIDE SERPL-SCNC: 108 MMOL/L (ref 99–110)
CO2 SERPL-SCNC: 19 MMOL/L (ref 21–32)
CREAT SERPL-MCNC: 0.8 MG/DL (ref 0.9–1.3)
GFR SERPLBLD CREATININE-BSD FMLA CKD-EPI: >60 ML/MIN/{1.73_M2}
GLUCOSE BLD-MCNC: 101 MG/DL (ref 70–99)
GLUCOSE BLD-MCNC: 88 MG/DL (ref 70–99)
GLUCOSE BLD-MCNC: 97 MG/DL (ref 70–99)
GLUCOSE SERPL-MCNC: 111 MG/DL (ref 70–99)
PERFORMED ON: ABNORMAL
PERFORMED ON: NORMAL
PERFORMED ON: NORMAL
POTASSIUM SERPL-SCNC: 4.3 MMOL/L (ref 3.5–5.1)
SODIUM SERPL-SCNC: 139 MMOL/L (ref 136–145)

## 2023-11-27 PROCEDURE — 2060000000 HC ICU INTERMEDIATE R&B

## 2023-11-27 PROCEDURE — 2580000003 HC RX 258: Performed by: INTERNAL MEDICINE

## 2023-11-27 PROCEDURE — 97166 OT EVAL MOD COMPLEX 45 MIN: CPT

## 2023-11-27 PROCEDURE — 51798 US URINE CAPACITY MEASURE: CPT

## 2023-11-27 PROCEDURE — 6360000002 HC RX W HCPCS: Performed by: INTERNAL MEDICINE

## 2023-11-27 PROCEDURE — C9113 INJ PANTOPRAZOLE SODIUM, VIA: HCPCS | Performed by: NURSE PRACTITIONER

## 2023-11-27 PROCEDURE — 36415 COLL VENOUS BLD VENIPUNCTURE: CPT

## 2023-11-27 PROCEDURE — 99232 SBSQ HOSP IP/OBS MODERATE 35: CPT

## 2023-11-27 PROCEDURE — 6370000000 HC RX 637 (ALT 250 FOR IP): Performed by: INTERNAL MEDICINE

## 2023-11-27 PROCEDURE — 97530 THERAPEUTIC ACTIVITIES: CPT

## 2023-11-27 PROCEDURE — 99254 IP/OBS CNSLTJ NEW/EST MOD 60: CPT | Performed by: SURGERY

## 2023-11-27 PROCEDURE — 97162 PT EVAL MOD COMPLEX 30 MIN: CPT

## 2023-11-27 PROCEDURE — 6360000002 HC RX W HCPCS: Performed by: NURSE PRACTITIONER

## 2023-11-27 PROCEDURE — 6370000000 HC RX 637 (ALT 250 FOR IP): Performed by: NURSE PRACTITIONER

## 2023-11-27 PROCEDURE — 80048 BASIC METABOLIC PNL TOTAL CA: CPT

## 2023-11-27 RX ORDER — SODIUM CHLORIDE 0.9 % (FLUSH) 0.9 %
5-40 SYRINGE (ML) INJECTION EVERY 12 HOURS SCHEDULED
Status: CANCELLED | OUTPATIENT
Start: 2023-11-27

## 2023-11-27 RX ORDER — SUCRALFATE 1 G/1
1 TABLET ORAL EVERY 6 HOURS SCHEDULED
Status: DISCONTINUED | OUTPATIENT
Start: 2023-11-27 | End: 2023-11-27

## 2023-11-27 RX ORDER — SODIUM CHLORIDE 9 MG/ML
INJECTION, SOLUTION INTRAVENOUS PRN
Status: CANCELLED | OUTPATIENT
Start: 2023-11-27

## 2023-11-27 RX ORDER — LORAZEPAM 0.5 MG/1
0.5 TABLET ORAL
Status: CANCELLED | OUTPATIENT
Start: 2023-11-27 | End: 2023-11-28

## 2023-11-27 RX ORDER — PANTOPRAZOLE SODIUM 40 MG/10ML
40 INJECTION, POWDER, LYOPHILIZED, FOR SOLUTION INTRAVENOUS 2 TIMES DAILY
Status: DISCONTINUED | OUTPATIENT
Start: 2023-11-27 | End: 2023-11-29

## 2023-11-27 RX ORDER — SODIUM CHLORIDE 0.9 % (FLUSH) 0.9 %
5-40 SYRINGE (ML) INJECTION PRN
Status: CANCELLED | OUTPATIENT
Start: 2023-11-27

## 2023-11-27 RX ORDER — ASPIRIN 325 MG
325 TABLET ORAL ONCE
Status: CANCELLED | OUTPATIENT
Start: 2023-11-27 | End: 2023-11-27

## 2023-11-27 RX ORDER — ONDANSETRON 2 MG/ML
4 INJECTION INTRAMUSCULAR; INTRAVENOUS EVERY 6 HOURS PRN
Status: CANCELLED | OUTPATIENT
Start: 2023-11-27

## 2023-11-27 RX ORDER — SUCRALFATE 1 G/1
1 TABLET ORAL EVERY 6 HOURS SCHEDULED
Status: DISCONTINUED | OUTPATIENT
Start: 2023-11-27 | End: 2023-12-01 | Stop reason: HOSPADM

## 2023-11-27 RX ADMIN — ENOXAPARIN SODIUM 70 MG: 100 INJECTION SUBCUTANEOUS at 21:05

## 2023-11-27 RX ADMIN — SUCRALFATE 1 G: 1 TABLET ORAL at 12:21

## 2023-11-27 RX ADMIN — ENOXAPARIN SODIUM 70 MG: 100 INJECTION SUBCUTANEOUS at 10:33

## 2023-11-27 RX ADMIN — AMIODARONE HYDROCHLORIDE 0.5 MG/MIN: 50 INJECTION, SOLUTION INTRAVENOUS at 22:02

## 2023-11-27 RX ADMIN — PANTOPRAZOLE SODIUM 40 MG: 40 INJECTION, POWDER, FOR SOLUTION INTRAVENOUS at 10:33

## 2023-11-27 RX ADMIN — AMIODARONE HYDROCHLORIDE 0.5 MG/MIN: 50 INJECTION, SOLUTION INTRAVENOUS at 05:21

## 2023-11-27 RX ADMIN — SUCRALFATE 1 G: 1 TABLET ORAL at 03:48

## 2023-11-27 RX ADMIN — OXYCODONE 5 MG: 5 TABLET ORAL at 12:21

## 2023-11-27 RX ADMIN — SUCRALFATE 1 G: 1 TABLET ORAL at 18:04

## 2023-11-27 RX ADMIN — AMIODARONE HYDROCHLORIDE 400 MG: 200 TABLET ORAL at 10:33

## 2023-11-27 RX ADMIN — OXYCODONE 5 MG: 5 TABLET ORAL at 03:54

## 2023-11-27 RX ADMIN — PANTOPRAZOLE SODIUM 40 MG: 40 INJECTION, POWDER, FOR SOLUTION INTRAVENOUS at 21:05

## 2023-11-27 RX ADMIN — Medication 10 ML: at 21:06

## 2023-11-27 ASSESSMENT — PAIN SCALES - GENERAL
PAINLEVEL_OUTOF10: 6
PAINLEVEL_OUTOF10: 0
PAINLEVEL_OUTOF10: 10
PAINLEVEL_OUTOF10: 8
PAINLEVEL_OUTOF10: 8

## 2023-11-27 ASSESSMENT — PAIN DESCRIPTION - ORIENTATION
ORIENTATION: MID
ORIENTATION: MID
ORIENTATION: LOWER
ORIENTATION: LEFT

## 2023-11-27 ASSESSMENT — PAIN DESCRIPTION - DESCRIPTORS
DESCRIPTORS: SORE
DESCRIPTORS: ACHING
DESCRIPTORS: SHARP
DESCRIPTORS: SHARP

## 2023-11-27 ASSESSMENT — PAIN DESCRIPTION - LOCATION
LOCATION: ABDOMEN

## 2023-11-27 NOTE — CARE COORDINATION
Case Management Assessment  Initial Evaluation    Date/Time of Evaluation: 11/27/2023 9:22 AM  Assessment Completed by: Mindi Hutchison RN    If patient is discharged prior to next notation, then this note serves as note for discharge by case management. Patient Name: Bret Albert                   YOB: 1985  Diagnosis: Ventricular tachyarrhythmia ([address]) [I47.20]  Ventricular tachycardia ([address]) [I47.20]  Ischemic cardiomyopathy [I25.5]  Elevated troponin [R79.89]  Chest pain, unspecified type [R07.9]                   Date / Time: 11/25/2023  7:50 AM    Patient Admission Status: Inpatient   Readmission Risk (Low < 19, Mod (19-27), High > 27): Readmission Risk Score: 10.1    Current PCP: John Rodrigues MD  PCP verified by ? Yes    Chart Reviewed: Yes      History Provided by: Patient  Patient Orientation: Alert and Oriented    Patient Cognition: Alert    Hospitalization in the last 30 days (Readmission):  No    If yes, Readmission Assessment in  Navigator will be completed. Advance Directives:      Code Status: Full Code   Patient's Primary Decision Maker is: Legal Next of Kin    Primary Decision Maker: Evangelina Moe Dr - Niece/Nephew - [phone]    Secondary Decision Maker: anurag morton - Child - [phone]    Discharge Planning:    Patient lives with: Family Members Type of Home: Apartment  Primary Care Giver: Self  Patient Support Systems include: Family Members   Current Financial resources: Medicaid  Current community resources: None  Current services prior to admission: None            Current DME:              Type of Home Care services:  None    ADLS  Prior functional level: Independent in ADLs/IADLs  Current functional level: Independent in ADLs/IADLs    PT AM-PAC:   /24  OT AM-PAC:   /24    Family can provide assistance at DC: Yes  Would you like Case Management to discuss the discharge plan with any other family members/significant others, and if so, who?  No  Plans to Return

## 2023-11-27 NOTE — PROGRESS NOTES
Physical Therapy  Facility/Department: [address] [place] [address]  Physical Therapy Initial Assessment/Treatment    Name: Dafne Tracey  : 1985  MRN: [id number]  Date of Service: 2023    Discharge Recommendations:  IP Rehab, Patient able to tolerate 3hrs of therapy a day   PT Equipment Recommendations  Equipment Needed: No  Other: defer      Therapy discharge recommendations take into account each patient's current medical complexities and are subject to input/oversight from the patient's healthcare team.   Barriers to Home Discharge:   [x] Steps to access home entry or bed/bath: 2 level apartment    [x] Unable to transfer, ambulate, or propel wheelchair household distances without assist   [x] Limited available assist at home upon discharge - no  A available   [] Patient or family requests d/c to post-acute facility    [] Poor cognition/safety awareness for d/c to home alone    []Unable to maintain ordered weight bearing status    [] Patient with salient signs of long-standing immobility   [x] Patient is at risk for falls   [] Other:    If pt is unable to be seen after this session, please let this note serve as discharge summary. Please see case management note for discharge disposition. Thank you. Patient Diagnosis(es): The primary encounter diagnosis was Ventricular tachyarrhythmia ([address] Diagnoses of Chest pain, unspecified type, Ischemic cardiomyopathy, and Elevated troponin were also pertinent to this visit. Past Medical History:  has a past medical history of Muscular dystrophy ([address] Past Surgical History:  has a past surgical history that includes Pacemaker insertion. Assessment   Body Structures, Functions, Activity Limitations Requiring Skilled Therapeutic Intervention: Decreased functional mobility ; Decreased ADL status; Decreased ROM; Decreased body mechanics; Decreased safe awareness;Decreased coordination;Decreased balance;Decreased strength;Decreased sensation;Decreased Goals: 12/4/23  Short Term Goal 1: pt will perform bed mobility with mod-ind  Short Term Goal 2: pt will perform functional transfers with LRAD and supv  Short Term Goal 3: pt will ambulate 50 ft with LRAD and SBA  Short Term Goal 4: pt will perform 10-12 reps of BLE exercises to improve LE functional strength by 12/1  Short Term Goal 5: pt will perform 3-5 steps with hand rail and min A  Patient Goals   Patient Goals : \"to go home\"       Education  Patient Education  Education Given To: Patient  Education Provided: Role of Therapy;Plan of Care;Transfer Training; Fall Prevention Strategies; Equipment  Education Provided Comments: role of PT, importance of mobility, safety with AD and gait belt use  Education Method: Verbal  Barriers to Learning: None  Education Outcome: Verbalized understanding;Continued education needed      Therapy Time   Individual Concurrent Group Co-treatment   Time In 0810         Time Out 0837         Minutes 27         Timed Code Treatment Minutes: 17 Minutes (10 min eval)       Eula Michelle, PT, DPT

## 2023-11-27 NOTE — OP NOTE
Department of General Surgery Consult    PATIENT NAME: Avila Cyr   YOB: 1985    ADMISSION DATE: 2023  7:50 AM      TODAY'S DATE: 2023    Reason for Consult:  duodenitis    Chief Complaint: chest pain    Historian: patient    Requesting Physician:  Kristan Espinosa    HISTORY OF PRESENT ILLNESS:              The patient is a 42 y.o. male who presents with chest and abd pain. Reports having some indigestion and LUQ abd pain. Some nausea. No hematemesis. No fevers or chills. Denies prior ulcer history.     Past Medical History:        Diagnosis Date    Muscular dystrophy ([address])        Past Surgical History:        Procedure Laterality Date    PACEMAKER INSERTION         Current Medications:   Current Facility-Administered Medications: pantoprazole (PROTONIX) injection 40 mg, 40 mg, IntraVENous, BID  sucralfate (CARAFATE) tablet 1 g, 1 g, Oral, 4 times per day  oxyCODONE (ROXICODONE) immediate release tablet 5 mg, 5 mg, Oral, Q6H PRN  amiodarone (CORDARONE) tablet 400 mg, 400 mg, Oral, Daily  [COMPLETED] amiodarone (CORDARONE) 150 mg in dextrose 5 % 100 mL bolus, 150 mg, IntraVENous, Once **FOLLOWED BY** [] amiodarone (CORDARONE) 450 mg in dextrose 5 % 250 mL infusion, 1 mg/min, IntraVENous, Continuous **FOLLOWED BY** amiodarone (CORDARONE) 450 mg in dextrose 5 % 250 mL infusion, 0.5 mg/min, IntraVENous, Continuous  sodium chloride flush 0.9 % injection 5-40 mL, 5-40 mL, IntraVENous, 2 times per day  sodium chloride flush 0.9 % injection 5-40 mL, 5-40 mL, IntraVENous, PRN  0.9 % sodium chloride infusion, , IntraVENous, PRN  ondansetron (ZOFRAN-ODT) disintegrating tablet 4 mg, 4 mg, Oral, Q8H PRN **OR** ondansetron (ZOFRAN) injection 4 mg, 4 mg, IntraVENous, Q6H PRN  polyethylene glycol (GLYCOLAX) packet 17 g, 17 g, Oral, Daily PRN  enoxaparin (LOVENOX) injection 70 mg, 1 mg/kg, SubCUTAneous, BID  [Held by provider] pantoprazole (PROTONIX) tablet 40 mg, 40 mg, Oral, QAM 178     BMP:    Recent Labs     11/25/23  0814 11/26/23  0450 11/27/23  0450    136 139   K 4.1 4.3 4.3    106 108   CO2 22 18* 19*   BUN 24* 19 20   CREATININE 0.9 0.8* 0.8*   GLUCOSE 133* 104* 111*     Hepatic: No results for input(s): \"AST\", \"ALT\", \"ALB\", \"BILITOT\", \"ALKPHOS\" in the last 72 hours. Mag:      Recent Labs     11/25/23  0814 11/26/23  0944   MG 2.20 2.50*      Phos:   No results for input(s): \"PHOS\" in the last 72 hours. INR: No results for input(s): \"INR\" in the last 72 hours. Radiology Review: Images personally reviewed by me.    CT - duodenitis uncomplicated      IMPRESSION/RECOMMENDATIONS:    43 yo with uncomplicated duodenitis  Treat with PPI  No operative plans  If symptoms worsen of evidence of bleeding then consult GI for EGD    Electronically signed by Jania Matute MD     [address] [facility]  [id number]

## 2023-11-27 NOTE — PROGRESS NOTES
Occupational Therapy  Facility/Department: [facility]  Occupational Therapy Initial Assessment    Name: Dafne Tracey  : 1985  MRN: [id number]  Date of Service: 2023    Discharge Recommendations:  IP Rehab  OT Equipment Recommendations  Equipment Needed:  (defer)       Patient Diagnosis(es): The primary encounter diagnosis was Ventricular tachyarrhythmia ([address]). Diagnoses of Chest pain, unspecified type, Ischemic cardiomyopathy, and Elevated troponin were also pertinent to this visit. Past Medical History:  has a past medical history of Muscular dystrophy ([address] Past Surgical History:  has a past surgical history that includes Pacemaker insertion. Therapy discharge recommendations take into account each patient's current medical complexities and are subject to input/oversight from the patient's healthcare team.   Barriers to Home Discharge:   [x] Steps to access home entry or bed/bath: 11 MAX   [x] Unable to transfer, ambulate, or propel wheelchair household distances without assist   [x] Limited available assist at home upon discharge- does not have 24hr   [] Patient or family requests d/c to post-acute facility    [] Poor cognition/safety awareness for d/c to home alone    []Unable to maintain ordered weight bearing status    [] Patient with salient signs of long-standing immobility   [x] Patient is at risk for falls due to: 3 falls in the last month   [x] Other: Decreased ADL status    If pt is unable to be seen after this session, please let this note serve as discharge summary. Please see case management note for discharge disposition. Thank you. Assessment   Performance deficits / Impairments: Decreased functional mobility ; Decreased ADL status; Decreased endurance;Decreased balance;Decreased safe awareness;Decreased strength;Decreased high-level IADLs  Assessment: Pt is a 42yo man admitted d/t v Tach with chest pain.  Pt has a hx of Muscular dystrophy, pace-maker placement and reports flexion with limited abilit to extend B elbows)  PROM: Grossly decreased, non-functional  Strength: Generally decreased, functional (decreased functional endurance/strength)  Coordination: Within functional limits  Tone: Normal  Sensation: Impaired (pt reports n/t in LLE)  ADL  Feeding: NPO  Grooming: Setup;Stand by assistance  Grooming Skilled Clinical Factors: seated in recliner to brush teeth and wash face  LE Dressing: Setup;Stand by assistance  LE Dressing Skilled Clinical Factors: to don B shoes              Vision  Vision: Within Functional Limits  Hearing  Hearing: Within functional limits  Cognition  Overall Cognitive Status: WFL  Orientation  Overall Orientation Status: Within Functional Limits  Orientation Level: Oriented X4                  Education Given To: Patient  Education Provided: Role of Therapy;Transfer Training;Plan of Care;Energy Conservation; ADL Adaptive Strategies;Precautions; Fall Prevention Strategies  Education Provided Comments: Disease specific, need for IPR and IPR requirements  Education Method: Demonstration;Verbal  Barriers to Learning: None  Education Outcome: Verbalized understanding;Demonstrated understanding; Other (Comment)      AM-PAC Score        AM-[facility] Inpatient Daily Activity Raw Score: 18 (11/27/23 1034)  AM-PAC Inpatient ADL T-Scale Score : 38.66 (11/27/23 1034)  ADL Inpatient CMS 0-100% Score: 46.65 (11/27/23 1034)  ADL Inpatient CMS G-Code Modifier : CK (11/27/23 1034)    Goals  Short Term Goals  Time Frame for Short Term Goals: 1wk (12/04) unless noted elswhere  Short Term Goal 1: Pt will complete LE dressing with Mod I  Short Term Goal 2: Pt will complete functional toilet t/fs from EOB with Mod I  Short Term Goal 3: Pt will complete grooming at sink for >5 minutes with Mod I  Patient Goals   Patient goals : \"to go home\"       Therapy Time   Individual Concurrent Group Co-treatment   Time In 0811         Time Out 0837         Minutes 26         Timed Code Treatment

## 2023-11-27 NOTE — CONSULTS
Comprehensive Nutrition Assessment    Type and Reason for Visit:  Positive Nutrition Screen, Consult (diet edu)    Nutrition Recommendations/Plan:   Continue NPO. Advance diet after procedure. Monitor need for ONS. Monitor diet education needs. Malnutrition Assessment:  Malnutrition Status: At risk for malnutrition (Comment) (11/27/23 1251)    Context:  Acute Illness     Findings of the 6 clinical characteristics of malnutrition:  Energy Intake:  Mild decrease in energy intake (Comment)  Weight Loss:  No significant weight loss     Body Fat Loss:  Unable to assess     Muscle Mass Loss:  Unable to assess    Fluid Accumulation:  No significant fluid accumulation     Strength:  Not Performed    Nutrition Assessment:    +nutrition screen for wt loss and decreased appetite. Pt with PMH of muscular dystrophy and cardiomyopahy, who presented with intermittent chest pain and v-tach. Pt currently NPO for possible procedure tomorrow. Per EMR, no significant wt loss noted. HF dx likely. Will monitor need for diet education at follow-up. Possible need for ONS when diet advances. Nutrition Related Findings:    BM 11/27; Labs reviewed Wound Type: None       Current Nutrition Intake & Therapies:    Average Meal Intake: NPO  Average Supplements Intake: NPO  Diet NPO    Anthropometric Measures:  Height: 165.1 cm (5' 5\")  Ideal Body Weight (IBW): 136 lbs (62 kg)       Current Body Weight: 70 kg (154 lb 5.2 oz), 113.5 % IBW. Weight Source: Bed Scale  Current BMI (kg/m2): 25.7                          BMI Categories: Overweight (BMI 25.0-29. 9)    Estimated Daily Nutrient Needs:        Energy (kcal/day): 1400 -1750 kcala (20-25 kcals/kg 70 kg CBW)     Protein (g/day):  gm (1.2-1.4 gm/kg IBW)     Fluid (ml/day): 1 mL/kcal or per provider d/t possible HF    Nutrition Diagnosis:   Inadequate oral intake related to inadequate protein-energy intake as evidenced by NPO or clear liquid status due to medical condition    Nutrition Interventions:   Food and/or Nutrient Delivery:  (Monitor for start of nutrition)  Nutrition Education/Counseling: Education needed (if HF dx)  Coordination of Nutrition Care: Continue to monitor while inpatient       Goals:     Goals: Initiate PO diet, within 2 days       Nutrition Monitoring and Evaluation:      Food/Nutrient Intake Outcomes: Diet Advancement/Tolerance  Physical Signs/Symptoms Outcomes: Biochemical Data, Weight, Skin, Nutrition Focused Physical Findings    Discharge Planning:     Too soon to determine     Simon Mcburney, [address] [facility], [address]: [phone]

## 2023-11-27 NOTE — PROGRESS NOTES
Hospital Medicine Progress Note      Date of Admission: 11/25/2023  Hospital Day: 3    Chief Admission Complaint:  Chest Pain    Subjective:  no new c/o. Presenting Admission History:         \"Alex Knapp is a 42 y.o. male with pmh of marijuana use, former heroin abuse(used to snort never use injected heroin) complex cardiac disease with A-fib on Eliquis, myotonic dystrophy who presents with evaluation of chest pain. Patient reports having intermittent chest pain since last 1 week. On EMS evaluation patient was found to be in V. tach, he was found to be hypoxic in the 80s on room air started on 4 L cannula     In the ER, EKG showed V. tach with heart rate in 140s otherwise hemodynamically stable. Electrolytes including magnesium and potassium were replaced and patient was started on amiodarone drip after contacting EP. Subsequently patient became hypotensive after which amiodarone was discontinued and patient received bolus of fluid. On my examination laying comfortably not in acute distress. Denies any chest pain or palpitations. Amiodarone is off,  systolic blood pressure in the 90s, telemetry with paced rhythm\"    Assessment/Plan:      Current Principal Problem:  Ventricular tachycardia (HCC)    Ventricular tachycardia POA. On amiodarone gtt. this morning. EP has been consulted, electrolytes replaced will repeat in 6 hours. Noted EP plan to start oral amnio with plan to upgrade to biventricular ICD on Tuesday    Elevated proBNP without obvious fluid overload likely due to V. tach/pulmonary edema. Elevated troponin 541-552 with EKG showing V. Tach. Cardiology/EP on board, additional cardiac workup per cardiology    Acute Hypoxic respiratory failure due to vascular congestion/flash pulm edema. On 4 L nasal cannula, improving    Mixed respiratory and metabolic acidosis with pH of 7.23. Will check lactic acid    A-fib on Eliquis.   Resumed    Ischemic cardiomyopathy will hold Entresto due

## 2023-11-27 NOTE — PLAN OF CARE
Problem: Discharge Planning  Goal: Discharge to home or other facility with appropriate resources  Outcome: Progressing  Flowsheets (Taken 11/27/2023 1848)  Discharge to home or other facility with appropriate resources:   Identify barriers to discharge with patient and caregiver   Identify discharge learning needs (meds, wound care, etc)     Problem: Safety - Adult  Goal: Free from fall injury  Outcome: Progressing  Flowsheets (Taken 11/27/2023 1848)  Free From Fall Injury: Instruct family/caregiver on patient safety     Problem: Nutrition Deficit:  Goal: Optimize nutritional status  Outcome: Progressing  Flowsheets (Taken 11/27/2023 1848)  Nutrient intake appropriate for improving, restoring, or maintaining nutritional needs:   Assess nutritional status and recommend course of action   Monitor oral intake, labs, and treatment plans   Rd Ponce, RN  11/27/2023 Chonodrocutaneous Helical Advancement Flap Text: The defect edges were debeveled with a #15 scalpel blade.  Given the location of the defect and the proximity to free margins a chondrocutaneous helical advancement flap was deemed most appropriate.  Using a sterile surgical marker, the appropriate advancement flap was drawn incorporating the defect and placing the expected incisions within the relaxed skin tension lines where possible.    The area thus outlined was incised deep to adipose tissue with a #15 scalpel blade.  The skin margins were undermined to an appropriate distance in all directions utilizing iris scissors.

## 2023-11-27 NOTE — ACP (ADVANCE CARE PLANNING)
Advance Care Planning     General Advance Care Planning (ACP) Conversation    Date of Conversation: 11/25/2023 11/27/2023  Conducted with: Patient with Decision Making Capacity    Healthcare Decision Maker:    Primary Decision Maker: Za Elizabeth - Niece/Nephew - [phone]    Secondary Decision Maker: anurag morton - Child - [phone]  Click here to complete Healthcare Decision Makers including selection of the Healthcare Decision Maker Relationship (ie \"Primary\"). Today we documented Decision Maker(s) consistent with Legal Next of Kin hierarchy. Content/Action Overview:  Has NO ACP documents/care preferences - information provided, considering goals and options   Names niece as primary- [place] law NOK explained.    Remains full code    Length of Voluntary ACP Conversation in minutes:  <16 minutes (Non-Billable)    Leydi Sheth RN

## 2023-11-27 NOTE — TELEPHONE ENCOUNTER
----- Message from Galen Stern MD sent at 11/26/2023 10:44 AM EST -----    HST in 4 to 6 weeks time if the patient wants and follow-up with Girma Diaz after that

## 2023-11-27 NOTE — PROGRESS NOTES
Pt has had 2 urine occurrences this morning, but none since. Pt denies feeling the urge to urinate. Bladder scanned twice. Once for 106 mL and once for 113 mL retained urine. Writer notified cross-cover physician. Per conversation with physician, monitor UOP overnight. Will continue to monitor.  Alicia Agosto RN

## 2023-11-28 ENCOUNTER — ANESTHESIA (OUTPATIENT)
Dept: CARDIAC CATH/INVASIVE PROCEDURES | Age: 38
End: 2023-11-28
Payer: MEDICAID

## 2023-11-28 ENCOUNTER — NURSE ONLY (OUTPATIENT)
Dept: CARDIOLOGY CLINIC | Age: 38
End: 2023-11-28

## 2023-11-28 ENCOUNTER — ANESTHESIA EVENT (OUTPATIENT)
Dept: CARDIAC CATH/INVASIVE PROCEDURES | Age: 38
End: 2023-11-28
Payer: MEDICAID

## 2023-11-28 ENCOUNTER — PROCEDURE VISIT (OUTPATIENT)
Dept: CARDIOLOGY CLINIC | Age: 38
End: 2023-11-28

## 2023-11-28 ENCOUNTER — APPOINTMENT (OUTPATIENT)
Dept: GENERAL RADIOLOGY | Age: 38
End: 2023-11-28
Payer: MEDICAID

## 2023-11-28 ENCOUNTER — APPOINTMENT (OUTPATIENT)
Dept: CARDIAC CATH/INVASIVE PROCEDURES | Age: 38
End: 2023-11-28
Payer: MEDICAID

## 2023-11-28 DIAGNOSIS — I44.2 CHB (COMPLETE HEART BLOCK) (HCC): ICD-10-CM

## 2023-11-28 DIAGNOSIS — I48.11 LONGSTANDING PERSISTENT ATRIAL FIBRILLATION (HCC): ICD-10-CM

## 2023-11-28 DIAGNOSIS — Z95.0 PRESENCE OF PERMANENT CARDIAC PACEMAKER: Primary | ICD-10-CM

## 2023-11-28 DIAGNOSIS — Z95.810 PRESENCE OF CARDIAC RESYNCHRONIZATION THERAPY DEFIBRILLATOR (CRT-D): ICD-10-CM

## 2023-11-28 LAB
ANION GAP SERPL CALCULATED.3IONS-SCNC: 12 MMOL/L (ref 3–16)
BUN SERPL-MCNC: 22 MG/DL (ref 7–20)
CALCIUM SERPL-MCNC: 8.5 MG/DL (ref 8.3–10.6)
CHLORIDE SERPL-SCNC: 106 MMOL/L (ref 99–110)
CO2 SERPL-SCNC: 20 MMOL/L (ref 21–32)
CREAT SERPL-MCNC: 1 MG/DL (ref 0.9–1.3)
DEPRECATED RDW RBC AUTO: 14.7 % (ref 12.4–15.4)
GFR SERPLBLD CREATININE-BSD FMLA CKD-EPI: >60 ML/MIN/{1.73_M2}
GLUCOSE BLD-MCNC: 66 MG/DL (ref 70–99)
GLUCOSE BLD-MCNC: 86 MG/DL (ref 70–99)
GLUCOSE BLD-MCNC: 87 MG/DL (ref 70–99)
GLUCOSE SERPL-MCNC: 91 MG/DL (ref 70–99)
HCT VFR BLD AUTO: 40.5 % (ref 40.5–52.5)
HGB BLD-MCNC: 13.5 G/DL (ref 13.5–17.5)
MAGNESIUM SERPL-MCNC: 2.2 MG/DL (ref 1.8–2.4)
MCH RBC QN AUTO: 29 PG (ref 26–34)
MCHC RBC AUTO-ENTMCNC: 33.4 G/DL (ref 31–36)
MCV RBC AUTO: 86.9 FL (ref 80–100)
PERFORMED ON: ABNORMAL
PERFORMED ON: NORMAL
PERFORMED ON: NORMAL
PLATELET # BLD AUTO: 168 K/UL (ref 135–450)
PMV BLD AUTO: 9.3 FL (ref 5–10.5)
POTASSIUM SERPL-SCNC: 4.2 MMOL/L (ref 3.5–5.1)
RBC # BLD AUTO: 4.65 M/UL (ref 4.2–5.9)
SODIUM SERPL-SCNC: 138 MMOL/L (ref 136–145)
WBC # BLD AUTO: 8.7 K/UL (ref 4–11)

## 2023-11-28 PROCEDURE — 33233 REMOVAL OF PM GENERATOR: CPT

## 2023-11-28 PROCEDURE — 6360000002 HC RX W HCPCS

## 2023-11-28 PROCEDURE — 3700000001 HC ADD 15 MINUTES (ANESTHESIA): Performed by: ANESTHESIOLOGY

## 2023-11-28 PROCEDURE — 6360000002 HC RX W HCPCS: Performed by: NURSE ANESTHETIST, CERTIFIED REGISTERED

## 2023-11-28 PROCEDURE — C1894 INTRO/SHEATH, NON-LASER: HCPCS | Performed by: INTERNAL MEDICINE

## 2023-11-28 PROCEDURE — 97535 SELF CARE MNGMENT TRAINING: CPT

## 2023-11-28 PROCEDURE — 2580000003 HC RX 258: Performed by: NURSE ANESTHETIST, CERTIFIED REGISTERED

## 2023-11-28 PROCEDURE — 2709999900 HC NON-CHARGEABLE SUPPLY: Performed by: INTERNAL MEDICINE

## 2023-11-28 PROCEDURE — 33225 L VENTRIC PACING LEAD ADD-ON: CPT | Performed by: INTERNAL MEDICINE

## 2023-11-28 PROCEDURE — 7100000000 HC PACU RECOVERY - FIRST 15 MIN: Performed by: ANESTHESIOLOGY

## 2023-11-28 PROCEDURE — 85027 COMPLETE CBC AUTOMATED: CPT

## 2023-11-28 PROCEDURE — 6370000000 HC RX 637 (ALT 250 FOR IP): Performed by: INTERNAL MEDICINE

## 2023-11-28 PROCEDURE — C1882 AICD, OTHER THAN SING/DUAL: HCPCS | Performed by: INTERNAL MEDICINE

## 2023-11-28 PROCEDURE — 71045 X-RAY EXAM CHEST 1 VIEW: CPT

## 2023-11-28 PROCEDURE — C1769 GUIDE WIRE: HCPCS | Performed by: INTERNAL MEDICINE

## 2023-11-28 PROCEDURE — 2580000003 HC RX 258

## 2023-11-28 PROCEDURE — 6370000000 HC RX 637 (ALT 250 FOR IP): Performed by: NURSE PRACTITIONER

## 2023-11-28 PROCEDURE — 33249 INSJ/RPLCMT DEFIB W/LEAD(S): CPT

## 2023-11-28 PROCEDURE — C1777 LEAD, AICD, ENDO SINGLE COIL: HCPCS | Performed by: INTERNAL MEDICINE

## 2023-11-28 PROCEDURE — 02H63KZ INSERTION OF DEFIBRILLATOR LEAD INTO RIGHT ATRIUM, PERCUTANEOUS APPROACH: ICD-10-PCS | Performed by: INTERNAL MEDICINE

## 2023-11-28 PROCEDURE — 6360000002 HC RX W HCPCS: Performed by: NURSE PRACTITIONER

## 2023-11-28 PROCEDURE — 0JPT0PZ REMOVAL OF CARDIAC RHYTHM RELATED DEVICE FROM TRUNK SUBCUTANEOUS TISSUE AND FASCIA, OPEN APPROACH: ICD-10-PCS | Performed by: INTERNAL MEDICINE

## 2023-11-28 PROCEDURE — C1900 LEAD, CORONARY VENOUS: HCPCS | Performed by: INTERNAL MEDICINE

## 2023-11-28 PROCEDURE — 80048 BASIC METABOLIC PNL TOTAL CA: CPT

## 2023-11-28 PROCEDURE — 2580000003 HC RX 258: Performed by: INTERNAL MEDICINE

## 2023-11-28 PROCEDURE — 02HK3KZ INSERTION OF DEFIBRILLATOR LEAD INTO RIGHT VENTRICLE, PERCUTANEOUS APPROACH: ICD-10-PCS | Performed by: INTERNAL MEDICINE

## 2023-11-28 PROCEDURE — C9113 INJ PANTOPRAZOLE SODIUM, VIA: HCPCS | Performed by: NURSE PRACTITIONER

## 2023-11-28 PROCEDURE — 7100000001 HC PACU RECOVERY - ADDTL 15 MIN: Performed by: ANESTHESIOLOGY

## 2023-11-28 PROCEDURE — C1892 INTRO/SHEATH,FIXED,PEEL-AWAY: HCPCS | Performed by: INTERNAL MEDICINE

## 2023-11-28 PROCEDURE — 3700000000 HC ANESTHESIA ATTENDED CARE: Performed by: ANESTHESIOLOGY

## 2023-11-28 PROCEDURE — 2720000010 HC SURG SUPPLY STERILE: Performed by: INTERNAL MEDICINE

## 2023-11-28 PROCEDURE — 33249 INSJ/RPLCMT DEFIB W/LEAD(S): CPT | Performed by: INTERNAL MEDICINE

## 2023-11-28 PROCEDURE — 36415 COLL VENOUS BLD VENIPUNCTURE: CPT

## 2023-11-28 PROCEDURE — 2500000003 HC RX 250 WO HCPCS

## 2023-11-28 PROCEDURE — 6360000004 HC RX CONTRAST MEDICATION

## 2023-11-28 PROCEDURE — 83735 ASSAY OF MAGNESIUM: CPT

## 2023-11-28 PROCEDURE — 33225 L VENTRIC PACING LEAD ADD-ON: CPT

## 2023-11-28 PROCEDURE — C1760 CLOSURE DEV, VASC: HCPCS | Performed by: INTERNAL MEDICINE

## 2023-11-28 PROCEDURE — 0JH609Z INSERTION OF CARDIAC RESYNCHRONIZATION DEFIBRILLATOR PULSE GENERATOR INTO CHEST SUBCUTANEOUS TISSUE AND FASCIA, OPEN APPROACH: ICD-10-PCS | Performed by: INTERNAL MEDICINE

## 2023-11-28 PROCEDURE — C1887 CATHETER, GUIDING: HCPCS | Performed by: INTERNAL MEDICINE

## 2023-11-28 PROCEDURE — C1889 IMPLANT/INSERT DEVICE, NOC: HCPCS | Performed by: INTERNAL MEDICINE

## 2023-11-28 PROCEDURE — 02H43KZ INSERTION OF DEFIBRILLATOR LEAD INTO CORONARY VEIN, PERCUTANEOUS APPROACH: ICD-10-PCS | Performed by: INTERNAL MEDICINE

## 2023-11-28 PROCEDURE — 2060000000 HC ICU INTERMEDIATE R&B

## 2023-11-28 RX ORDER — SODIUM CHLORIDE 9 MG/ML
INJECTION, SOLUTION INTRAVENOUS CONTINUOUS PRN
Status: DISCONTINUED | OUTPATIENT
Start: 2023-11-28 | End: 2023-11-28 | Stop reason: SDUPTHER

## 2023-11-28 RX ORDER — PROPOFOL 10 MG/ML
INJECTION, EMULSION INTRAVENOUS PRN
Status: DISCONTINUED | OUTPATIENT
Start: 2023-11-28 | End: 2023-11-28 | Stop reason: SDUPTHER

## 2023-11-28 RX ORDER — FENTANYL CITRATE 50 UG/ML
50 INJECTION, SOLUTION INTRAMUSCULAR; INTRAVENOUS
Status: COMPLETED | OUTPATIENT
Start: 2023-11-28 | End: 2023-11-29

## 2023-11-28 RX ORDER — PROPOFOL 10 MG/ML
INJECTION, EMULSION INTRAVENOUS CONTINUOUS PRN
Status: DISCONTINUED | OUTPATIENT
Start: 2023-11-28 | End: 2023-11-28 | Stop reason: SDUPTHER

## 2023-11-28 RX ORDER — SODIUM CHLORIDE 9 MG/ML
INJECTION, SOLUTION INTRAVENOUS CONTINUOUS
Status: DISCONTINUED | OUTPATIENT
Start: 2023-11-28 | End: 2023-12-01 | Stop reason: HOSPADM

## 2023-11-28 RX ORDER — FENTANYL CITRATE 50 UG/ML
INJECTION, SOLUTION INTRAMUSCULAR; INTRAVENOUS PRN
Status: DISCONTINUED | OUTPATIENT
Start: 2023-11-28 | End: 2023-11-28 | Stop reason: SDUPTHER

## 2023-11-28 RX ORDER — SODIUM CHLORIDE 0.9 % (FLUSH) 0.9 %
5-40 SYRINGE (ML) INJECTION EVERY 12 HOURS SCHEDULED
Status: DISCONTINUED | OUTPATIENT
Start: 2023-11-28 | End: 2023-11-28 | Stop reason: SDUPTHER

## 2023-11-28 RX ORDER — SODIUM CHLORIDE 9 MG/ML
INJECTION, SOLUTION INTRAVENOUS PRN
Status: DISCONTINUED | OUTPATIENT
Start: 2023-11-28 | End: 2023-12-01 | Stop reason: HOSPADM

## 2023-11-28 RX ORDER — SODIUM CHLORIDE 0.9 % (FLUSH) 0.9 %
5-40 SYRINGE (ML) INJECTION PRN
Status: DISCONTINUED | OUTPATIENT
Start: 2023-11-28 | End: 2023-11-28 | Stop reason: SDUPTHER

## 2023-11-28 RX ORDER — SODIUM CHLORIDE 0.9 % (FLUSH) 0.9 %
5-40 SYRINGE (ML) INJECTION EVERY 12 HOURS SCHEDULED
Status: DISCONTINUED | OUTPATIENT
Start: 2023-11-28 | End: 2023-12-01 | Stop reason: HOSPADM

## 2023-11-28 RX ORDER — SODIUM CHLORIDE 0.9 % (FLUSH) 0.9 %
5-40 SYRINGE (ML) INJECTION PRN
Status: DISCONTINUED | OUTPATIENT
Start: 2023-11-28 | End: 2023-12-01 | Stop reason: HOSPADM

## 2023-11-28 RX ADMIN — FENTANYL CITRATE 25 MCG: 50 INJECTION, SOLUTION INTRAMUSCULAR; INTRAVENOUS at 16:31

## 2023-11-28 RX ADMIN — SUCRALFATE 1 G: 1 TABLET ORAL at 05:41

## 2023-11-28 RX ADMIN — OXYCODONE 5 MG: 5 TABLET ORAL at 00:30

## 2023-11-28 RX ADMIN — SUCRALFATE 1 G: 1 TABLET ORAL at 00:35

## 2023-11-28 RX ADMIN — SODIUM CHLORIDE: 9 INJECTION, SOLUTION INTRAVENOUS at 16:10

## 2023-11-28 RX ADMIN — PANTOPRAZOLE SODIUM 40 MG: 40 INJECTION, POWDER, FOR SOLUTION INTRAVENOUS at 20:54

## 2023-11-28 RX ADMIN — Medication 10 ML: at 21:05

## 2023-11-28 RX ADMIN — AMIODARONE HYDROCHLORIDE 400 MG: 200 TABLET ORAL at 09:33

## 2023-11-28 RX ADMIN — FENTANYL CITRATE 25 MCG: 50 INJECTION, SOLUTION INTRAMUSCULAR; INTRAVENOUS at 16:47

## 2023-11-28 RX ADMIN — OXYCODONE 5 MG: 5 TABLET ORAL at 09:32

## 2023-11-28 RX ADMIN — PROPOFOL 125 MCG/KG/MIN: 10 INJECTION, EMULSION INTRAVENOUS at 16:10

## 2023-11-28 RX ADMIN — Medication 10 ML: at 09:34

## 2023-11-28 RX ADMIN — OXYCODONE 5 MG: 5 TABLET ORAL at 19:56

## 2023-11-28 RX ADMIN — PANTOPRAZOLE SODIUM 40 MG: 40 INJECTION, POWDER, FOR SOLUTION INTRAVENOUS at 09:32

## 2023-11-28 RX ADMIN — FENTANYL CITRATE 25 MCG: 50 INJECTION, SOLUTION INTRAMUSCULAR; INTRAVENOUS at 17:07

## 2023-11-28 RX ADMIN — PHENYLEPHRINE HYDROCHLORIDE 50 MCG/MIN: 10 INJECTION INTRAVENOUS at 16:38

## 2023-11-28 RX ADMIN — PROPOFOL 60 MG: 10 INJECTION, EMULSION INTRAVENOUS at 16:29

## 2023-11-28 RX ADMIN — FENTANYL CITRATE 25 MCG: 50 INJECTION, SOLUTION INTRAMUSCULAR; INTRAVENOUS at 17:32

## 2023-11-28 ASSESSMENT — PAIN - FUNCTIONAL ASSESSMENT
PAIN_FUNCTIONAL_ASSESSMENT: ACTIVITIES ARE NOT PREVENTED
PAIN_FUNCTIONAL_ASSESSMENT: ACTIVITIES ARE NOT PREVENTED

## 2023-11-28 ASSESSMENT — PAIN DESCRIPTION - ORIENTATION
ORIENTATION: LOWER
ORIENTATION: LOWER

## 2023-11-28 ASSESSMENT — PAIN DESCRIPTION - LOCATION
LOCATION: BACK
LOCATION: ABDOMEN
LOCATION: BACK

## 2023-11-28 ASSESSMENT — PAIN SCALES - GENERAL
PAINLEVEL_OUTOF10: 6
PAINLEVEL_OUTOF10: 5
PAINLEVEL_OUTOF10: 4
PAINLEVEL_OUTOF10: 8
PAINLEVEL_OUTOF10: 0
PAINLEVEL_OUTOF10: 10

## 2023-11-28 ASSESSMENT — PAIN DESCRIPTION - DESCRIPTORS
DESCRIPTORS: ACHING

## 2023-11-28 NOTE — ANESTHESIA PRE PROCEDURE
Department of Anesthesiology  Preprocedure Note       Name:  Theo Dc   Age:  42 y.o.  :  1985                                          MRN:  [id number]         Date:  2023      Surgeon: * Surgery not found *    Procedure:     Medications prior to admission:   Prior to Admission medications    Medication Sig Start Date End Date Taking?  Authorizing Provider   sacubitril-valsartan (ENTRESTO) 24-26 MG per tablet Take 1.5 tablets by mouth 2 times daily 10/24/23   Enrrique Must, APRSASCHA - [place]   metoprolol succinate (TOPROL XL) 25 MG extended release tablet Take 1 tablet by mouth daily 23   Enrrique Must, APRSASCHA - CNP   apixaban (ELIQUIS) 5 MG TABS tablet Take 1 tablet by mouth 2 times daily 23   Enrrique Must, DEB - CNP   nicotine (NICODERM CQ) 14 MG/24HR Place 1 patch onto the skin daily  Patient not taking: Reported on 2023 8/3/23   John Lang MD       Current medications:    Current Facility-Administered Medications   Medication Dose Route Frequency Provider Last Rate Last Admin    sodium chloride flush 0.9 % injection 5-40 mL  5-40 mL IntraVENous 2 times per day DEB Garcia - CNP        sodium chloride flush 0.9 % injection 5-40 mL  5-40 mL IntraVENous PRN DEB Garcia CNP        0.9 % sodium chloride infusion   IntraVENous Continuous DEB Garcia CNP   Held at 23 1245    pantoprazole (PROTONIX) injection 40 mg  40 mg IntraVENous BID DEB Moseley CNP   40 mg at 23 0932    sucralfate (CARAFATE) tablet 1 g  1 g Oral 4 times per day DEB Moseley CNP   1 g at 23 0541    oxyCODONE (ROXICODONE) immediate release tablet 5 mg  5 mg Oral Q6H PRN Calixto Kaplan MD   5 mg at 23 0932    amiodarone (CORDARONE) tablet 400 mg  400 mg Oral Daily Xavi Kenyon MD   400 mg at 23 1117    sodium chloride flush 0.9 % injection 5-40 mL  5-40 mL IntraVENous 2 times per day Calixto Kaplan MD   10 mL at

## 2023-11-28 NOTE — PROGRESS NOTES
Pt brought to PACU. Report obtained from Cath Lab RN and anesthesia.  Pt placed on monitor AV Paced and O2 at 2L NC.

## 2023-11-28 NOTE — PROCEDURES
Procedure: Upgrade of dual chamber pacemaker to [employer] [place] (CRT-D)      Surgeon: Kyle Martins MD    Complications: none    Estimated blood loss: less than 100 ml    Anesthesia: MAC    History/Indication: 42 y.o. male  with history of muscular dystrophy and complete heart block with severe LV systolic dysfunction and ventricular tachycardia. Presents for upgrade to CRTD    Primary/Secondary Prevention Indication: secondary    ANTIBIOTIC GIVEN:  Cefazolin 2 g IV. NYHA Class: Patient has symptoms consistent with NYHA Class III    QRS Duration: 140 (paced)      Procedure in detail:  The patient was brought to the electrophysiology laboratory in stable condition. The patient was in a fasting and non-sedated state. The risks, benefits and alternatives of the procedure were discussed with the patient and his family. The risks including, but not limited to, the risks of vascular injury, bleeding, infection, device malfunction, lead dislodgement, radiation exposure, injury to cardiac and surrounding structures (including pneumothorax), inappropriate shocks, renal failure, stroke, myocardial infarction and death were discussed in detail. The patient opted to proceed with the upgrade from the current device to BiV/ICD (CRT-D). Written informed consent was signed and placed in the chart. The patient was prepped and draped in a sterile fashion. A 5 cm incision was made in the left pectoral groove (over the existing scar line) after administration of lidocaine. Using electrocautery and blunt dissection, the pocket was accessed and the pulse generator externalized. Central venous access into the left subclavian vein was obtained using a modified Seldinger technique. After central venous access was obtained, a sheath was placed in the left subclavian vein. A right ventricular lead was advanced into position in the apical septum under fluoroscopic guidance and using a series of curved stylets.  The Model number 6935-65 cm. Serial number [id number].  is Medtronic. Implant date of 11/28/2023 in the right ventricular septum. 5. Coronary sinus lead: Model number 4298-88 cm. Serial number [id number].  is Medtronic. Implant date of 11/28/2023 in a lateral branch of the CS. BASELINE PARAMETERS:   1. Right atrial lead: Sensing of the P wave was not obtained (patient in atrial fibrillation). 2. Right ventricular lead: Sensing of the R wave was in the range of 4.0 millivolts. Pacing impedance was 589 ohms. Capture threshold was at 0.8 volts at 0.4 milliseconds. 3. Coronary sinus lead: Pacing impedance was 592 ohms. Capture threshold was at 1.7 volts at 0.4 milliseconds (LV 2 to LV4). Programmed Parameters:    DDD rate     Paced AV interval 130  Sensed AV interval 100    Therapy zones    Therapies     + bpm    Successive 26 joule shocks   -229 bpm    ATP x 3, 26 joule shocks  -166 bpm    ATP x 3, 26 joule shocks    Therapy Zones:    1. Monitor zone: 150-166 (no therapy)  2. VT 1 zone: 167-229 ATP x 6  then 4 x 40 Joule shocks  3. VF zone: 231+ 6 x 40 Joule shocks (ATP while charging)      Summary:  Successful upgrade of a dual chamber PPM to a Biventricular/ICD device.       Recommendations:  Bedrest x 4 hours  Left arm in sling for 24 hours  Pain control  Vital signs per protocol (see orders)  Portable chest x-ray to rule out pneumothorax  ECG upon arrival on floor and daily  Monitor on telemetry

## 2023-11-28 NOTE — CARE COORDINATION
LOS 3. Care managed by [address] Here w V Tach. Plan ICD today. From home in apt w niece- primarily Shannan Credelmis- has myotonic dystrophy. Has IPR recs- discussed at bedside yesterday-pt declined stating he has done rehab before- wants home. CM following. Hoda Roblero, RN      5251 Discussed w RN and attending. Following procedure today- will have decreased use of UE. Discussed w pt and family at bedside- will consider if rehab needed post ICD placement.  Hoda Roblero, RN

## 2023-11-28 NOTE — CONSULTS
Department of General Surgery Consult     PATIENT NAME: Annie Siegel   YOB: 1985     ADMISSION DATE: 2023  7:50 AM      TODAY'S DATE: 2023     Reason for Consult:  duodenitis     Chief Complaint: chest pain     Historian: patient     Requesting Physician:  Alfredo Bradford     HISTORY OF PRESENT ILLNESS:               The patient is a 42 y.o. male who presents with chest and abd pain. Reports having some indigestion and LUQ abd pain. Some nausea. No hematemesis. No fevers or chills. Denies prior ulcer history.      Past Medical History:    Past Medical History            Diagnosis Date    Muscular dystrophy ([address])              Past Surgical History:    Past Surgical History             Procedure Laterality Date    PACEMAKER INSERTION                Current Medications:     Current Hospital Medications   Current Facility-Administered Medications: pantoprazole (PROTONIX) injection 40 mg, 40 mg, IntraVENous, BID  sucralfate (CARAFATE) tablet 1 g, 1 g, Oral, 4 times per day  oxyCODONE (ROXICODONE) immediate release tablet 5 mg, 5 mg, Oral, Q6H PRN  amiodarone (CORDARONE) tablet 400 mg, 400 mg, Oral, Daily  [COMPLETED] amiodarone (CORDARONE) 150 mg in dextrose 5 % 100 mL bolus, 150 mg, IntraVENous, Once **FOLLOWED BY** [] amiodarone (CORDARONE) 450 mg in dextrose 5 % 250 mL infusion, 1 mg/min, IntraVENous, Continuous **FOLLOWED BY** amiodarone (CORDARONE) 450 mg in dextrose 5 % 250 mL infusion, 0.5 mg/min, IntraVENous, Continuous  sodium chloride flush 0.9 % injection 5-40 mL, 5-40 mL, IntraVENous, 2 times per day  sodium chloride flush 0.9 % injection 5-40 mL, 5-40 mL, IntraVENous, PRN  0.9 % sodium chloride infusion, , IntraVENous, PRN  ondansetron (ZOFRAN-ODT) disintegrating tablet 4 mg, 4 mg, Oral, Q8H PRN **OR** ondansetron (ZOFRAN) injection 4 mg, 4 mg, IntraVENous, Q6H PRN  polyethylene glycol (GLYCOLAX) packet 17 g, 17 g, Oral, Daily PRN  enoxaparin (LOVENOX) injection 70 mg, 1 mg/kg,

## 2023-11-28 NOTE — PROGRESS NOTES
[x]No    ------------------------------------------------------------------------------------------------------------------------------------------------------------------------    MDM      [x] High (any 2)    A. Problems (any 1)  [x] Acute/Chronic Illness/injury posing threat to life or bodily function:  [facility] w/ need for ICD upgrade. [] Severe exacerbation of chronic illness:    ---------------------------------------------------------------------  B. Risk of Treatment (any 1)   [] Drugs/treatments that require intensive monitoring for toxicity include:    [] IV ABX requiring serial renal monitoring for nephrotoxicity:     [] Post-Cath/Contrast study requiring serial renal monitoring for Contrast Induced Nephropathy  [] IV Narcotic analgesia for ADR   [] Aggressive IV diuresis requiring serial monitoring for renal impairment and electrolyte derangements  [] Hypertonic Saline requiring serial renal monitoring for appropriate electrolyte correction rate   [] Critical electrolyte abnormalities requiring IV replacement and close serial monitoring  [] Insulin - monitoring FSBS for Hypoglycemic ADR  [] Other - Amiodarone gtt   [] Change in code status:    [] Decision to escalate care:    [] Major surgery/procedure with associated risk factors:    ----------------------------------------------------------------------  C.  Data (any 2)  [] Discussed management of the case with:  General Surgery 27 Nov w/ plan to advance diet  [x] Discussed the discharge plan in detail with case mgt including timing/barriers to discharge, need for support services and placement decision   [x] Imaging personally reviewed and interpreted, includes:   [x] Telemetry monitoring w/ NSR   [x] Data Review (any 3)  [] Collateral history obtained from:    [x] All available Consultant notes from yesterday/today were reviewed  [x] All current labs were reviewed and interpreted for clinical significance   [x] Appropriate follow-up labs were

## 2023-11-28 NOTE — PROGRESS NOTES
Occupational Therapy  Facility/Department: [address]  CATH LAB  Daily Treatment Note  NAME: Yazmin Rodriguez  : 1985  MRN: [id number]    Date of Service: 2023    Discharge Recommendations:  IP Rehab  OT Equipment Recommendations  Other: defer  Therapy discharge recommendations take into account each patient's current medical complexities and are subject to input/oversight from the patient's healthcare team.   Barriers to Home Discharge:   [x] Steps to access home entry or bed/bath: 11 MAX   [x] Unable to transfer, ambulate, or propel wheelchair household distances without assist   [x] Limited available assist at home upon discharge- does not have 24hr   [] Patient or family requests d/c to post-acute facility    [] Poor cognition/safety awareness for d/c to home alone    []Unable to maintain ordered weight bearing status    [] Patient with salient signs of long-standing immobility   [x] Patient is at risk for falls due to: 3 falls in the last month   [x] Other: Decreased ADL status     If pt is unable to be seen after this session, please let this note serve as discharge summary. Please see case management note for discharge disposition. Thank you. AM-PAC Daily Activity - Inpatient   AM-PAC Inpatient Daily Activity Raw Score: 18  AM-PAC Inpatient ADL T-Scale Score : 38.66  ADL Inpatient CMS 0-100% Score: 46.65  ADL Inpatient CMS G-Code Modifier : CK       Patient Diagnosis(es): The primary encounter diagnosis was Ventricular tachyarrhythmia ([address] Diagnoses of Chest pain, unspecified type, Ischemic cardiomyopathy, and Elevated troponin were also pertinent to this visit. Assessment    Assessment: Pt pleasant and agreeable to tx. seated in chair at start and end of session. Pt completed TB sponge bathing seated in chair with Mod A LE and SBA UE. Pt Set-up grooming and CGA for x5 sit<>stands with poor endurance and fatiguing from the process to get into standing.  Cont per POC and continue to

## 2023-11-28 NOTE — PLAN OF CARE
Problem: Safety - Adult  Goal: Free from fall injury  Outcome: Progressing     Problem: Pain  Goal: Verbalizes/displays adequate comfort level or baseline comfort level  Outcome: Progressing     Problem: Cardiovascular - Adult  Goal: Maintains optimal cardiac output and hemodynamic stability  11/28/2023 1559 by Padmini Chao RN  Flowsheets (Taken 11/28/2023 1559)  Maintains optimal cardiac output and hemodynamic stability:   Monitor blood pressure and heart rate   Assess for signs of decreased cardiac output  Goal: Absence of cardiac dysrhythmias or at baseline  11/28/2023 1559 by Pamdini Chao RN  Flowsheets (Taken 11/28/2023 1559)  Absence of cardiac dysrhythmias or at baseline:   Monitor cardiac rate and rhythm   Assess for signs of decreased cardiac output   Administer antiarrhythmia medication and electrolyte replacement as ordered

## 2023-11-29 ENCOUNTER — APPOINTMENT (OUTPATIENT)
Dept: GENERAL RADIOLOGY | Age: 38
End: 2023-11-29
Payer: MEDICAID

## 2023-11-29 ENCOUNTER — PROCEDURE VISIT (OUTPATIENT)
Dept: CARDIOLOGY CLINIC | Age: 38
End: 2023-11-29

## 2023-11-29 DIAGNOSIS — Z95.0 PRESENCE OF PERMANENT CARDIAC PACEMAKER: ICD-10-CM

## 2023-11-29 DIAGNOSIS — Z95.810 CARDIAC RESYNCHRONIZATION THERAPY DEFIBRILLATOR (CRT-D) IN PLACE: Primary | ICD-10-CM

## 2023-11-29 DIAGNOSIS — I42.8 NICM (NONISCHEMIC CARDIOMYOPATHY) (HCC): ICD-10-CM

## 2023-11-29 LAB
ANION GAP SERPL CALCULATED.3IONS-SCNC: 13 MMOL/L (ref 3–16)
BUN SERPL-MCNC: 18 MG/DL (ref 7–20)
CALCIUM SERPL-MCNC: 8.3 MG/DL (ref 8.3–10.6)
CHLORIDE SERPL-SCNC: 103 MMOL/L (ref 99–110)
CO2 SERPL-SCNC: 19 MMOL/L (ref 21–32)
CREAT SERPL-MCNC: 0.9 MG/DL (ref 0.9–1.3)
DEPRECATED RDW RBC AUTO: 14.5 % (ref 12.4–15.4)
GFR SERPLBLD CREATININE-BSD FMLA CKD-EPI: >60 ML/MIN/{1.73_M2}
GLUCOSE BLD-MCNC: 79 MG/DL (ref 70–99)
GLUCOSE SERPL-MCNC: 79 MG/DL (ref 70–99)
HCT VFR BLD AUTO: 39.2 % (ref 40.5–52.5)
HCT VFR BLD AUTO: 41.9 % (ref 40.5–52.5)
HGB BLD-MCNC: 12.8 G/DL (ref 13.5–17.5)
HGB BLD-MCNC: 13.7 G/DL (ref 13.5–17.5)
MAGNESIUM SERPL-MCNC: 2 MG/DL (ref 1.8–2.4)
MCH RBC QN AUTO: 28.7 PG (ref 26–34)
MCHC RBC AUTO-ENTMCNC: 32.8 G/DL (ref 31–36)
MCV RBC AUTO: 87.6 FL (ref 80–100)
PERFORMED ON: NORMAL
PLATELET # BLD AUTO: 168 K/UL (ref 135–450)
PMV BLD AUTO: 8.7 FL (ref 5–10.5)
POTASSIUM SERPL-SCNC: 4.1 MMOL/L (ref 3.5–5.1)
RBC # BLD AUTO: 4.47 M/UL (ref 4.2–5.9)
SODIUM SERPL-SCNC: 135 MMOL/L (ref 136–145)
WBC # BLD AUTO: 11.4 K/UL (ref 4–11)

## 2023-11-29 PROCEDURE — 85018 HEMOGLOBIN: CPT

## 2023-11-29 PROCEDURE — 6370000000 HC RX 637 (ALT 250 FOR IP): Performed by: INTERNAL MEDICINE

## 2023-11-29 PROCEDURE — 85027 COMPLETE CBC AUTOMATED: CPT

## 2023-11-29 PROCEDURE — 6370000000 HC RX 637 (ALT 250 FOR IP): Performed by: NURSE PRACTITIONER

## 2023-11-29 PROCEDURE — 2060000000 HC ICU INTERMEDIATE R&B

## 2023-11-29 PROCEDURE — 83735 ASSAY OF MAGNESIUM: CPT

## 2023-11-29 PROCEDURE — 93005 ELECTROCARDIOGRAM TRACING: CPT

## 2023-11-29 PROCEDURE — 85014 HEMATOCRIT: CPT

## 2023-11-29 PROCEDURE — 97530 THERAPEUTIC ACTIVITIES: CPT

## 2023-11-29 PROCEDURE — 99232 SBSQ HOSP IP/OBS MODERATE 35: CPT

## 2023-11-29 PROCEDURE — 6360000002 HC RX W HCPCS: Performed by: NURSE PRACTITIONER

## 2023-11-29 PROCEDURE — C9113 INJ PANTOPRAZOLE SODIUM, VIA: HCPCS | Performed by: NURSE PRACTITIONER

## 2023-11-29 PROCEDURE — 71046 X-RAY EXAM CHEST 2 VIEWS: CPT

## 2023-11-29 PROCEDURE — 36415 COLL VENOUS BLD VENIPUNCTURE: CPT

## 2023-11-29 PROCEDURE — 6370000000 HC RX 637 (ALT 250 FOR IP)

## 2023-11-29 PROCEDURE — 80048 BASIC METABOLIC PNL TOTAL CA: CPT

## 2023-11-29 PROCEDURE — 2580000003 HC RX 258: Performed by: INTERNAL MEDICINE

## 2023-11-29 RX ORDER — OXYCODONE HYDROCHLORIDE 5 MG/1
5 TABLET ORAL EVERY 6 HOURS PRN
Status: DISCONTINUED | OUTPATIENT
Start: 2023-11-29 | End: 2023-11-29

## 2023-11-29 RX ORDER — OXYCODONE HYDROCHLORIDE 5 MG/1
10 TABLET ORAL EVERY 4 HOURS PRN
Status: DISCONTINUED | OUTPATIENT
Start: 2023-11-29 | End: 2023-12-01 | Stop reason: HOSPADM

## 2023-11-29 RX ORDER — OXYCODONE HYDROCHLORIDE 5 MG/1
5 TABLET ORAL EVERY 4 HOURS PRN
Status: DISCONTINUED | OUTPATIENT
Start: 2023-11-29 | End: 2023-12-01 | Stop reason: HOSPADM

## 2023-11-29 RX ADMIN — FENTANYL CITRATE 50 MCG: 50 INJECTION INTRAMUSCULAR; INTRAVENOUS at 11:37

## 2023-11-29 RX ADMIN — PANTOPRAZOLE SODIUM 40 MG: 40 INJECTION, POWDER, FOR SOLUTION INTRAVENOUS at 09:26

## 2023-11-29 RX ADMIN — OXYCODONE 5 MG: 5 TABLET ORAL at 04:28

## 2023-11-29 RX ADMIN — SUCRALFATE 1 G: 1 TABLET ORAL at 23:34

## 2023-11-29 RX ADMIN — AMIODARONE HYDROCHLORIDE 400 MG: 200 TABLET ORAL at 09:26

## 2023-11-29 RX ADMIN — Medication 10 ML: at 20:32

## 2023-11-29 RX ADMIN — APIXABAN 5 MG: 5 TABLET, FILM COATED ORAL at 09:26

## 2023-11-29 RX ADMIN — FENTANYL CITRATE 50 MCG: 50 INJECTION INTRAMUSCULAR; INTRAVENOUS at 00:00

## 2023-11-29 RX ADMIN — OXYCODONE 10 MG: 5 TABLET ORAL at 18:07

## 2023-11-29 RX ADMIN — ACETAMINOPHEN 650 MG: 325 TABLET ORAL at 11:02

## 2023-11-29 RX ADMIN — SUCRALFATE 1 G: 1 TABLET ORAL at 11:02

## 2023-11-29 RX ADMIN — SUCRALFATE 1 G: 1 TABLET ORAL at 18:09

## 2023-11-29 RX ADMIN — SUCRALFATE 1 G: 1 TABLET ORAL at 04:28

## 2023-11-29 RX ADMIN — Medication 10 ML: at 09:26

## 2023-11-29 RX ADMIN — OXYCODONE 5 MG: 5 TABLET ORAL at 15:18

## 2023-11-29 RX ADMIN — Medication 10 ML: at 00:01

## 2023-11-29 RX ADMIN — OXYCODONE 10 MG: 5 TABLET ORAL at 23:34

## 2023-11-29 RX ADMIN — SUCRALFATE 1 G: 1 TABLET ORAL at 00:04

## 2023-11-29 ASSESSMENT — PAIN DESCRIPTION - LOCATION
LOCATION: INCISION
LOCATION: CHEST
LOCATION: OTHER (COMMENT)
LOCATION: CHEST;INCISION

## 2023-11-29 ASSESSMENT — PAIN - FUNCTIONAL ASSESSMENT
PAIN_FUNCTIONAL_ASSESSMENT: PREVENTS OR INTERFERES SOME ACTIVE ACTIVITIES AND ADLS
PAIN_FUNCTIONAL_ASSESSMENT: ACTIVITIES ARE NOT PREVENTED
PAIN_FUNCTIONAL_ASSESSMENT: PREVENTS OR INTERFERES SOME ACTIVE ACTIVITIES AND ADLS
PAIN_FUNCTIONAL_ASSESSMENT: PREVENTS OR INTERFERES WITH MANY ACTIVE NOT PASSIVE ACTIVITIES

## 2023-11-29 ASSESSMENT — PAIN DESCRIPTION - DESCRIPTORS
DESCRIPTORS: THROBBING;JABBING
DESCRIPTORS: BURNING
DESCRIPTORS: ACHING

## 2023-11-29 ASSESSMENT — PAIN SCALES - GENERAL
PAINLEVEL_OUTOF10: 2
PAINLEVEL_OUTOF10: 6
PAINLEVEL_OUTOF10: 7
PAINLEVEL_OUTOF10: 5
PAINLEVEL_OUTOF10: 10
PAINLEVEL_OUTOF10: 6
PAINLEVEL_OUTOF10: 8
PAINLEVEL_OUTOF10: 2
PAINLEVEL_OUTOF10: 5

## 2023-11-29 ASSESSMENT — PAIN SCALES - WONG BAKER
WONGBAKER_NUMERICALRESPONSE: 0
WONGBAKER_NUMERICALRESPONSE: 0

## 2023-11-29 ASSESSMENT — PAIN DESCRIPTION - ORIENTATION: ORIENTATION: LEFT

## 2023-11-29 NOTE — PROGRESS NOTES
Hospital Medicine Progress Note      Date of Admission: 11/25/2023  Hospital Day: 5    Chief Admission Complaint:  Chest Pain    Subjective:  no new c/o. Presenting Admission History:         \"Alex Jalloh is a 42 y.o. male with pmh of marijuana use, former heroin abuse(used to snort never use injected heroin) complex cardiac disease with A-fib on Eliquis, myotonic dystrophy who presents with evaluation of chest pain. Patient reports having intermittent chest pain since last 1 week. On EMS evaluation patient was found to be in V. tach, he was found to be hypoxic in the 80s on room air started on 4 L cannula     In the ER, EKG showed V. tach with heart rate in 140s otherwise hemodynamically stable. Electrolytes including magnesium and potassium were replaced and patient was started on amiodarone drip after contacting EP. Subsequently patient became hypotensive after which amiodarone was discontinued and patient received bolus of fluid. On my examination laying comfortably not in acute distress. Denies any chest pain or palpitations. Amiodarone is off,  systolic blood pressure in the 90s, telemetry with paced rhythm\"    Assessment/Plan:      Current Principal Problem:  Ventricular tachycardia ([address])    Ventricular Tachycardia -  POArrival.  Cardiology EP consulted and appreciated s/p upgrade to biventricular ICD on Tuesday 28 Nov    Elevated troponin 541-552 with EKG showing V. Tach. Cardiology/EP on board, additional cardiac workup per cardiology    Acute Respiratory Failure - w/ hypoxia, POArrival, likely 2nd to acute pulmonary edema 2nd to arrythmia. Presence of clinical respiratory distress w/ tachypnea/dyspnea/SOB and wheezing w/ use of accessory muscles to breath. Supplemental O2 and wean as tolerated. Afib - chronic paroxysmal of unspecified and clinically unable to determine etiology. Normally rate controlled on BBlocker - held.   Cardiology consulted and appreciated - started on

## 2023-11-29 NOTE — PROGRESS NOTES
Oneil Do with cardio came to bedside and assessed and ordered EKG. Ice applied oxy given. Oneil Do np to follow up and update me and pt. EKG tang to do stat testing. We only have one EKG machine and it is being used.

## 2023-11-29 NOTE — PLAN OF CARE
Problem: Discharge Planning  Goal: Discharge to home or other facility with appropriate resources  Outcome: Progressing     Problem: Skin/Tissue Integrity  Goal: Absence of new skin breakdown  Description: 1. Monitor for areas of redness and/or skin breakdown  2. Assess vascular access sites hourly  3. Every 4-6 hours minimum:  Change oxygen saturation probe site  4. Every 4-6 hours:  If on nasal continuous positive airway pressure, respiratory therapy assess nares and determine need for appliance change or resting period.   Outcome: Progressing     Problem: Safety - Adult  Goal: Free from fall injury  11/29/2023 0201 by Angely Garcia RN  Outcome: Progressing  Flowsheets (Taken 11/29/2023 0201)  Free From Fall Injury: Instruct family/caregiver on patient safety  11/28/2023 1559 by Erinn Roman RN  Outcome: Progressing     Problem: Pain  Goal: Verbalizes/displays adequate comfort level or baseline comfort level  11/29/2023 0201 by Agnely Garcia RN  Outcome: Progressing  Flowsheets (Taken 11/29/2023 0201)  Verbalizes/displays adequate comfort level or baseline comfort level:   Assess pain using appropriate pain scale   Encourage patient to monitor pain and request assistance   Implement non-pharmacological measures as appropriate and evaluate response  11/28/2023 1559 by Erinn Roman RN  Outcome: Progressing     Problem: Cardiovascular - Adult  Goal: Maintains optimal cardiac output and hemodynamic stability  11/29/2023 0201 by Angely Garcia RN  Outcome: Progressing  11/28/2023 1559 by Erinn Roman RN  Flowsheets (Taken 11/28/2023 1559)  Maintains optimal cardiac output and hemodynamic stability:   Monitor blood pressure and heart rate   Assess for signs of decreased cardiac output  11/28/2023 1559 by Erinn Roman RN  Outcome: Progressing  Flowsheets (Taken 11/28/2023 1559)  Maintains optimal cardiac output and hemodynamic stability:   Monitor blood pressure and heart rate   Assess

## 2023-11-29 NOTE — PROGRESS NOTES
911)    2)Lisha Rating of Perceived Exertion (RPE) Scale     The Lisha rating scale ranges from 6 to 20, where 6 means \"no exertion at all\" and 20 means \"maximal exertion. \" The patient chooses the number that best describes their level of exertion. This will objectify the intensity level of the patient's activity, and the therapist can use this information to accelerate or decelerate activity levels to reach the desired range. The patient should appraise their feeling of exertion as honestly as possible, without thinking about what the actual physical load is. Their feeling of effort and exertion is important, and it should not be compared to the level of others. Patient's should target exercises for very light to moderate (9-11/20) for this phase of their rehab. Lisha RPE Scale    Activity Completed:  Transferring supine<>sit and sitting EOB ~10 mins    []6  No exertion at all  []7  Extremely light  []8  [] 9  Very light (For a healthy person, it is like walking slowly at his or her own pace for some minutes)  []10  []11  Light  []12  [x]13  Somewhat hard (exercise, but it still feels OK to continue)  [x]14  []15  Hard (heavy)  []16  []17  Very hard (can still go on, but really has to push himself. It feels very heavy, and the person is very tired. )[]18  []19  Extremely hard (For most people this is the most strenuous exercise they have ever experienced.)  []20  Maximal exertion. 3) Pt educated in and completed Therapeutic exercise (as indicated below for 1 set) to promote circulation and prevent complications of bedrest with patient verbalizes understanding of employing green zone, yellow zone and red zone to seek provider input and evaluation.   Educated patient in :  [x]Supine    Level 1: Bed Exercise 10-15 reps, 2x/day  [x]Ankle Pumps  []Heel Slides  []Hip Abduction  []Buttocks Squeeze  []Diaphragmatic Breathing with TA set  []Shoulder Shrugs  []Bicep Curl  []Hands open/close instruction  [x]Demonstration  [x]Written handout      Safety Devices  Type of Devices: All fall risk precautions in place; All sarahy prominences offloaded;Bed alarm in place;Call light within reach; Patient at risk for falls; Left in bed;Nurse notified       Goals  Short Term Goals  Time Frame for Short Term Goals: 12/4/23  Short Term Goal 1: pt will perform bed mobility with mod-ind  -11/29 sba  Short Term Goal 2: pt will perform functional transfers with LRAD and supv   -11/29 cga rw  Short Term Goal 3: pt will ambulate 50 ft with LRAD and SBA   -11/29 12' rw cga  Short Term Goal 4: pt will perform 10-12 reps of BLE exercises to improve LE functional strength by 12/1   -11/29 progressing  Short Term Goal 5: pt will perform 3-5 steps with hand rail and min A   -11/29 NT  Patient Goals   Patient Goals : \"to go home\"    Education  Patient Education  Education Given To: Patient  Education Provided: Role of Therapy;Plan of Care;Transfer Training; Fall Prevention Strategies; Equipment  Education Provided Comments: role of PT, importance of mobility, LUE pacemaker precautions, safety  Education Method: Verbal;Demonstration  Barriers to Learning: None  Education Outcome: Verbalized understanding;Continued education needed    Therapy Time   Individual Concurrent Group Co-treatment   Time In 1040         Time Out 1103         Minutes 23         Timed Code Treatment Minutes: [address], PT, DPT

## 2023-11-29 NOTE — PROGRESS NOTES
Physician Progress Note      Peggy ABBASI  [facility] #:                  [id number]  :                       1985  ADMIT DATE:       2023 7:50 AM  DISCH DATE:  RESPONDING  PROVIDER #:        Marylene Settles MD          QUERY TEXT:    Patient admitted with VT. Noted documentation of acute respiratory failure in   H/P. In order to support the diagnosis of acute respiratory failure, please   include additional clinical indicators in your documentation. Or please   document if the diagnosis of acute respiratory failure has been ruled out   after further study. The medical record reflects the following:  Risk Factors: Afib, PFO, VT, pulmonary edema  Clinical Indicators: Arrives on 4L NC with O2 sat of 94% to ED, work of   breathing described, per VS flow sheet- RR 15-27, pt weaned to RA after   arriving to nursing unit. O2 sats % on RA  on arrival VBG- pH 7.237->7.314  Treatment: O2, labs, imaging, cardiology consult, IVF discontinued per   Pulmonary consultant    Acute Respiratory Failure Clinical Indicators per 3M MS-DRG Training Guide and   Quick Reference Guide:  pO2 < 60 mmHg or SpO2 (pulse oximetry) < 91% breathing room air  pCO2 > 50 and pH < 7.35  P/F ratio (pO2 / FIO2) < 300  pO2 decrease or pCO2 increase by 10 mmHg from baseline (if known)  Supplemental oxygen of 40% or more  Presence of respiratory distress, tachypnea, dyspnea, shortness of breath,   wheezing  Unable to speak in complete sentences  Use of accessory muscles to breathe  Extreme anxiety and feeling of impending doom  Tripod position  Confusion/altered mental status/obtunded    Thank you,  Cecy Stein RN BSN [email or web address]  Options provided:  -- Acute Respiratory Failure as evidenced by, Please document evidence.   -- Acute Respiratory Failure ruled out after study  -- Other - I will add my own diagnosis  -- Disagree - Not applicable / Not valid  -- Disagree - Clinically unable to

## 2023-11-29 NOTE — PROGRESS NOTES
Vs taken pt set up for breakfast. Repositioned. Lcw and rt groin post procedure sites wnl. Hand  and oral care on bedside table.

## 2023-11-29 NOTE — PROGRESS NOTES
Emelyn with cardio rounded this a.m. then she came back a few hours later assessed and replaced steri strips. Then I came in to assess pt and he has swelling and what appears to be a hematoma under his left arm. It has been the same all morning. He told dk it felt like he broke his shoulder. Pt denies any change with site. Attending and cardio aware. Emelyn ok's ice so he has ice on it now. No pain meds ordered messaged md. Waiting.

## 2023-11-29 NOTE — PROGRESS NOTES
IMPLANTED MATERIALS:   1. CRT-D generator: Device name is Cobalt XT HF Quad MRI IS4 DF1.  is Medtronic. Model number [id number]. Serial number [id number]. Implant date of 11/28/2023 in the left pectoral area. 2. Right atrial lead: Model number 5076-45 cm. Serial number [id number].  is [employer]onic. Implant date of 11/28/2023 in the right atrial appendage. 3. Right ventricular lead: Model number 5076-52 cm. Serial number [id number].  is [employer]tronic. Implant date of 10/10/2006 in the right atrial appendage. 4. Right ventricular lead: Model number 6935-65 cm. Serial number [id number].  is [employer]tronic. Implant date of 11/28/2023 in the right ventricular septum. 5. Coronary sinus lead: Model number 4298-88 cm. Serial number [id number].  is [employer]tronic. Implant date of 11/28/2023 in a lateral branch of the CS. The sense pole on the new RV lead was capped and we instead used the existing RV pace/sense lead in the new device. A plug was placed into the C coil header port.

## 2023-11-29 NOTE — PROGRESS NOTES
Patient taken to room 433 by transporter in stable condition. Verified with RN that Cath Lab had called report.

## 2023-11-30 ENCOUNTER — APPOINTMENT (OUTPATIENT)
Dept: CARDIAC CATH/INVASIVE PROCEDURES | Age: 38
End: 2023-11-30
Payer: MEDICAID

## 2023-11-30 LAB
ABO + RH BLD: NORMAL
ALBUMIN SERPL-MCNC: 3.6 G/DL (ref 3.4–5)
ANION GAP SERPL CALCULATED.3IONS-SCNC: 10 MMOL/L (ref 3–16)
ANION GAP SERPL CALCULATED.3IONS-SCNC: 11 MMOL/L (ref 3–16)
BLD GP AB SCN SERPL QL: NORMAL
BUN SERPL-MCNC: 13 MG/DL (ref 7–20)
BUN SERPL-MCNC: 15 MG/DL (ref 7–20)
CALCIUM SERPL-MCNC: 8.2 MG/DL (ref 8.3–10.6)
CALCIUM SERPL-MCNC: 8.3 MG/DL (ref 8.3–10.6)
CHLORIDE SERPL-SCNC: 103 MMOL/L (ref 99–110)
CHLORIDE SERPL-SCNC: 104 MMOL/L (ref 99–110)
CO2 SERPL-SCNC: 22 MMOL/L (ref 21–32)
CO2 SERPL-SCNC: 22 MMOL/L (ref 21–32)
CREAT SERPL-MCNC: 0.8 MG/DL (ref 0.9–1.3)
CREAT SERPL-MCNC: 0.9 MG/DL (ref 0.9–1.3)
DEPRECATED RDW RBC AUTO: 14.6 % (ref 12.4–15.4)
DEPRECATED RDW RBC AUTO: 14.7 % (ref 12.4–15.4)
EKG ATRIAL RATE: 71 BPM
EKG DIAGNOSIS: NORMAL
EKG Q-T INTERVAL: 520 MS
EKG QRS DURATION: 172 MS
EKG QTC CALCULATION (BAZETT): 523 MS
EKG R AXIS: 192 DEGREES
EKG T AXIS: -64 DEGREES
EKG VENTRICULAR RATE: 61 BPM
GFR SERPLBLD CREATININE-BSD FMLA CKD-EPI: >60 ML/MIN/{1.73_M2}
GFR SERPLBLD CREATININE-BSD FMLA CKD-EPI: >60 ML/MIN/{1.73_M2}
GLUCOSE SERPL-MCNC: 87 MG/DL (ref 70–99)
GLUCOSE SERPL-MCNC: 90 MG/DL (ref 70–99)
HCT VFR BLD AUTO: 39.1 % (ref 40.5–52.5)
HCT VFR BLD AUTO: 39.9 % (ref 40.5–52.5)
HGB BLD-MCNC: 12.9 G/DL (ref 13.5–17.5)
HGB BLD-MCNC: 13.1 G/DL (ref 13.5–17.5)
MAGNESIUM SERPL-MCNC: 2 MG/DL (ref 1.8–2.4)
MCH RBC QN AUTO: 28.8 PG (ref 26–34)
MCH RBC QN AUTO: 29.1 PG (ref 26–34)
MCHC RBC AUTO-ENTMCNC: 32.7 G/DL (ref 31–36)
MCHC RBC AUTO-ENTMCNC: 33 G/DL (ref 31–36)
MCV RBC AUTO: 88 FL (ref 80–100)
MCV RBC AUTO: 88.1 FL (ref 80–100)
NT-PROBNP SERPL-MCNC: 5294 PG/ML (ref 0–124)
PHOSPHATE SERPL-MCNC: 3.1 MG/DL (ref 2.5–4.9)
PLATELET # BLD AUTO: 137 K/UL (ref 135–450)
PLATELET # BLD AUTO: 142 K/UL (ref 135–450)
PMV BLD AUTO: 8.4 FL (ref 5–10.5)
PMV BLD AUTO: 8.5 FL (ref 5–10.5)
POTASSIUM SERPL-SCNC: 3.9 MMOL/L (ref 3.5–5.1)
POTASSIUM SERPL-SCNC: 4.1 MMOL/L (ref 3.5–5.1)
RBC # BLD AUTO: 4.44 M/UL (ref 4.2–5.9)
RBC # BLD AUTO: 4.53 M/UL (ref 4.2–5.9)
SODIUM SERPL-SCNC: 135 MMOL/L (ref 136–145)
SODIUM SERPL-SCNC: 137 MMOL/L (ref 136–145)
WBC # BLD AUTO: 10.4 K/UL (ref 4–11)
WBC # BLD AUTO: 8.9 K/UL (ref 4–11)

## 2023-11-30 PROCEDURE — 36415 COLL VENOUS BLD VENIPUNCTURE: CPT

## 2023-11-30 PROCEDURE — 83880 ASSAY OF NATRIURETIC PEPTIDE: CPT

## 2023-11-30 PROCEDURE — 6370000000 HC RX 637 (ALT 250 FOR IP): Performed by: INTERNAL MEDICINE

## 2023-11-30 PROCEDURE — 2580000003 HC RX 258: Performed by: INTERNAL MEDICINE

## 2023-11-30 PROCEDURE — 6370000000 HC RX 637 (ALT 250 FOR IP): Performed by: NURSE PRACTITIONER

## 2023-11-30 PROCEDURE — 86900 BLOOD TYPING SEROLOGIC ABO: CPT

## 2023-11-30 PROCEDURE — 86850 RBC ANTIBODY SCREEN: CPT

## 2023-11-30 PROCEDURE — 97535 SELF CARE MNGMENT TRAINING: CPT

## 2023-11-30 PROCEDURE — 99232 SBSQ HOSP IP/OBS MODERATE 35: CPT

## 2023-11-30 PROCEDURE — 86901 BLOOD TYPING SEROLOGIC RH(D): CPT

## 2023-11-30 PROCEDURE — 85027 COMPLETE CBC AUTOMATED: CPT

## 2023-11-30 PROCEDURE — 80069 RENAL FUNCTION PANEL: CPT

## 2023-11-30 PROCEDURE — 93010 ELECTROCARDIOGRAM REPORT: CPT | Performed by: INTERNAL MEDICINE

## 2023-11-30 PROCEDURE — 83735 ASSAY OF MAGNESIUM: CPT

## 2023-11-30 PROCEDURE — 2060000000 HC ICU INTERMEDIATE R&B

## 2023-11-30 RX ORDER — SODIUM CHLORIDE 9 MG/ML
INJECTION, SOLUTION INTRAVENOUS PRN
Status: DISCONTINUED | OUTPATIENT
Start: 2023-11-30 | End: 2023-12-01 | Stop reason: HOSPADM

## 2023-11-30 RX ORDER — SODIUM CHLORIDE 0.9 % (FLUSH) 0.9 %
5-40 SYRINGE (ML) INJECTION PRN
Status: DISCONTINUED | OUTPATIENT
Start: 2023-11-30 | End: 2023-12-01 | Stop reason: HOSPADM

## 2023-11-30 RX ORDER — SODIUM CHLORIDE 0.9 % (FLUSH) 0.9 %
5-40 SYRINGE (ML) INJECTION EVERY 12 HOURS SCHEDULED
Status: DISCONTINUED | OUTPATIENT
Start: 2023-11-30 | End: 2023-12-01 | Stop reason: HOSPADM

## 2023-11-30 RX ADMIN — SUCRALFATE 1 G: 1 TABLET ORAL at 23:13

## 2023-11-30 RX ADMIN — OXYCODONE 10 MG: 5 TABLET ORAL at 05:27

## 2023-11-30 RX ADMIN — PANTOPRAZOLE SODIUM 40 MG: 40 TABLET, DELAYED RELEASE ORAL at 05:27

## 2023-11-30 RX ADMIN — OXYCODONE 10 MG: 5 TABLET ORAL at 23:13

## 2023-11-30 RX ADMIN — Medication 10 ML: at 21:00

## 2023-11-30 RX ADMIN — SUCRALFATE 1 G: 1 TABLET ORAL at 18:29

## 2023-11-30 RX ADMIN — SUCRALFATE 1 G: 1 TABLET ORAL at 05:27

## 2023-11-30 RX ADMIN — OXYCODONE 10 MG: 5 TABLET ORAL at 11:09

## 2023-11-30 RX ADMIN — AMIODARONE HYDROCHLORIDE 400 MG: 200 TABLET ORAL at 08:59

## 2023-11-30 RX ADMIN — OXYCODONE 10 MG: 5 TABLET ORAL at 18:33

## 2023-11-30 RX ADMIN — Medication 10 ML: at 09:04

## 2023-11-30 ASSESSMENT — PAIN DESCRIPTION - DESCRIPTORS
DESCRIPTORS: DISCOMFORT
DESCRIPTORS: ACHING;BURNING
DESCRIPTORS: ACHING

## 2023-11-30 ASSESSMENT — PAIN SCALES - GENERAL
PAINLEVEL_OUTOF10: 7
PAINLEVEL_OUTOF10: 8
PAINLEVEL_OUTOF10: 0
PAINLEVEL_OUTOF10: 3
PAINLEVEL_OUTOF10: 8

## 2023-11-30 ASSESSMENT — PAIN DESCRIPTION - LOCATION
LOCATION: BACK;CHEST
LOCATION: INCISION;BACK
LOCATION: SHOULDER;BACK
LOCATION: BACK

## 2023-11-30 NOTE — CARE COORDINATION
CM update; LOS # 5; Patient continues to refuse ARU or any type of Rehab. I have notified DR Rajendra Mansfield of the conversation.

## 2023-11-30 NOTE — PROGRESS NOTES
Hospital Medicine Progress Note      Date of Admission: 11/25/2023  Hospital Day: 6    Chief Admission Complaint:  Chest Pain    Subjective:  no new c/o. Presenting Admission History:         \"Alex Garcia is a 42 y.o. male with pmh of marijuana use, former heroin abuse(used to snort never use injected heroin) complex cardiac disease with A-fib on Eliquis, myotonic dystrophy who presents with evaluation of chest pain. Patient reports having intermittent chest pain since last 1 week. On EMS evaluation patient was found to be in V. tach, he was found to be hypoxic in the 80s on room air started on 4 L cannula     In the ER, EKG showed V. tach with heart rate in 140s otherwise hemodynamically stable. Electrolytes including magnesium and potassium were replaced and patient was started on amiodarone drip after contacting EP. Subsequently patient became hypotensive after which amiodarone was discontinued and patient received bolus of fluid. On my examination laying comfortably not in acute distress. Denies any chest pain or palpitations. Amiodarone is off,  systolic blood pressure in the 90s, telemetry with paced rhythm\"    Assessment/Plan:      Current Principal Problem:  Ventricular tachycardia (HCC)    Ventricular Tachycardia -  POArrival.  Cardiology EP consulted and appreciated s/p upgrade to biventricular ICD on Tuesday 28 Nov complicated by chest wall hematoma - Eliquis held. Plan for Life Vest at discharge. Troponin elevation - c/w myocardial injury 2nd to      [] STEMI/NSTEMI  [x] Trop leak 2nd to tachycardia  [x] Chronic Myocardial Injury 2nd to CHF  [] Recent MI (with 4 weeks of admission) dated:     of unclear clinical significance w/out signs/sxs of active ischemia. Documented and reviewed. Acute Respiratory Failure - w/ hypoxia, POArrival, likely 2nd to acute pulmonary edema 2nd to arrythmia.   Presence of clinical respiratory distress w/ tachypnea/dyspnea/SOB and wheezing w/ use of

## 2023-11-30 NOTE — PROGRESS NOTES
Comprehensive Nutrition Assessment    Type and Reason for Visit:  Reassess    Nutrition Recommendations/Plan:   Resume regular diet when able   Add magic cups once daily when able to consume PO   Monitor nutrition adequacy, pertinent labs, bowel habits, wt changes, and clinical progress     Malnutrition Assessment:  Malnutrition Status: At risk for malnutrition (Comment) (11/30/23 1354)    Context:  Acute Illness     Findings of the 6 clinical characteristics of malnutrition:  Energy Intake:  Mild decrease in energy intake (Comment)    Nutrition Assessment:    Follow up: EP consulted, s/p upgrade to biventricular ICD on 11/28. Pt on regular diet since 11/27, made NPO today for possible thrombectomy. Reports good apeptite and intake, eating most meals. Reports family bringing pt in food he likes. Reports stable weight around 150 lb. Willing to trial magic cups when able to consume PO. Declined ensure. Pt lethargic and note appropriate for diet educaiton at this time. Advance diet as soon as medically feasible. Continue to encourage PO intake, will continue to monitor. Nutrition Related Findings:    Labs reviewed. BM on 11/26. Wound Type: None       Current Nutrition Intake & Therapies:    Average Meal Intake: NPO  Average Supplements Intake: NPO  Diet NPO Exceptions are: Sips of Water with Meds    Anthropometric Measures:  Height: 165.1 cm (5' 5\")  Ideal Body Weight (IBW): 136 lbs (62 kg)       Current Body Weight: 73 kg (161 lb), 113.5 % IBW. Weight Source: Bed Scale  Current BMI (kg/m2): 26.8        Weight Adjustment For: No Adjustment                 BMI Categories: Overweight (BMI 25.0-29. 9)    Estimated Daily Nutrient Needs:        Energy (kcal/day): 1400 -1750 kcala (20-25 kcals/kg 70 kg CBW)     Protein (g/day):  gm (1.2-1.4 gm/kg IBW)     Fluid (ml/day): 1 mL/kcal or per provider d/t possible HF    Nutrition Diagnosis:   Inadequate oral intake related to inadequate protein-energy intake as evidenced

## 2023-11-30 NOTE — PROGRESS NOTES
Occupational Therapy  Facility/Department: [facility] C4 PCU  Daily Treatment Note  NAME: Isabel Olivia  : 1985  MRN: [id number]    Date of Service: 2023    Discharge Recommendations:  IP Rehab  OT Equipment Recommendations  Other: defer  Therapy discharge recommendations take into account each patient's current medical complexities and are subject to input/oversight from the patient's healthcare team.     Barriers to Home Discharge:   [] Steps to access home entry or bed/bath:   [] Unable to transfer, ambulate, or propel wheelchair household distances without assist   [x] Limited available assist at home upon discharge    [] Patient or family requests d/c to post-acute facility    [x] Poor cognition/safety awareness for d/c to home alone    [] Unable to maintain ordered weight bearing status    [] Patient with salient signs of long-standing immobility   [x] Decreased independence with ADLs   [] Increased risk for falls   [] Other:    If pt is unable to be seen after this session, please let this note serve as discharge summary. Please see case management note for discharge disposition. Thank you. Patient Diagnosis(es): The primary encounter diagnosis was Ventricular tachyarrhythmia ([address] Diagnoses of Chest pain, unspecified type, Ischemic cardiomyopathy, and Elevated troponin were also pertinent to this visit. Assessment    Assessment: Pt seen for ADLs and mobility. Pt completed bed mobility wtih SPV to EOB, sat EOB for ~15 minutes. Pt setup for grooming tasks. Pt declined sitting in recliner this date. Recommend IPR d/t pt functioning below baseline and limited support at home. Continue OT POC while in acute care.   Activity Tolerance: Patient tolerated treatment well  Discharge Recommendations: IP Rehab  Other: defer      Plan   Occupational Therapy Plan  Times Per Week: 3-5x/wk  Current Treatment Recommendations: Strengthening;Balance training;Functional mobility training;Home management

## 2023-11-30 NOTE — PROGRESS NOTES
Pt assessment completed and charted. VSS. Pt a/o x4. Pacemaker site is red,hot, swollen and hard w/ drainage noted. Pt states its painful to move. Prn pain medication given throughout shift. Ice placed accordingly. Pt has been NPO for possible thrombectomy. Pt uses urinal at bedside. Bed in lowest position and wheels locked. Call light within reach. Bedside table within reach. Non-skid footwear in place. Pt denies any other needs at this time. Pt calls out appropriately.

## 2023-12-01 VITALS
TEMPERATURE: 98.1 F | HEART RATE: 61 BPM | SYSTOLIC BLOOD PRESSURE: 122 MMHG | HEIGHT: 65 IN | OXYGEN SATURATION: 96 % | DIASTOLIC BLOOD PRESSURE: 79 MMHG | BODY MASS INDEX: 27.03 KG/M2 | RESPIRATION RATE: 18 BRPM | WEIGHT: 162.26 LBS

## 2023-12-01 LAB
ALBUMIN SERPL-MCNC: 3 G/DL (ref 3.4–5)
ANION GAP SERPL CALCULATED.3IONS-SCNC: 9 MMOL/L (ref 3–16)
BUN SERPL-MCNC: 12 MG/DL (ref 7–20)
CALCIUM SERPL-MCNC: 7.8 MG/DL (ref 8.3–10.6)
CHLORIDE SERPL-SCNC: 105 MMOL/L (ref 99–110)
CO2 SERPL-SCNC: 22 MMOL/L (ref 21–32)
CREAT SERPL-MCNC: 0.8 MG/DL (ref 0.9–1.3)
DEPRECATED RDW RBC AUTO: 14.3 % (ref 12.4–15.4)
GFR SERPLBLD CREATININE-BSD FMLA CKD-EPI: >60 ML/MIN/{1.73_M2}
GLUCOSE SERPL-MCNC: 100 MG/DL (ref 70–99)
HCT VFR BLD AUTO: 34.4 % (ref 40.5–52.5)
HGB BLD-MCNC: 11.5 G/DL (ref 13.5–17.5)
MAGNESIUM SERPL-MCNC: 2 MG/DL (ref 1.8–2.4)
MCH RBC QN AUTO: 29.3 PG (ref 26–34)
MCHC RBC AUTO-ENTMCNC: 33.6 G/DL (ref 31–36)
MCV RBC AUTO: 87.2 FL (ref 80–100)
PHOSPHATE SERPL-MCNC: 2.8 MG/DL (ref 2.5–4.9)
PLATELET # BLD AUTO: 126 K/UL (ref 135–450)
PMV BLD AUTO: 8.6 FL (ref 5–10.5)
POTASSIUM SERPL-SCNC: 4 MMOL/L (ref 3.5–5.1)
RBC # BLD AUTO: 3.94 M/UL (ref 4.2–5.9)
SODIUM SERPL-SCNC: 136 MMOL/L (ref 136–145)
WBC # BLD AUTO: 8.5 K/UL (ref 4–11)

## 2023-12-01 PROCEDURE — 80069 RENAL FUNCTION PANEL: CPT

## 2023-12-01 PROCEDURE — 83735 ASSAY OF MAGNESIUM: CPT

## 2023-12-01 PROCEDURE — 97110 THERAPEUTIC EXERCISES: CPT

## 2023-12-01 PROCEDURE — 6370000000 HC RX 637 (ALT 250 FOR IP): Performed by: INTERNAL MEDICINE

## 2023-12-01 PROCEDURE — 97530 THERAPEUTIC ACTIVITIES: CPT

## 2023-12-01 PROCEDURE — 85027 COMPLETE CBC AUTOMATED: CPT

## 2023-12-01 PROCEDURE — 36415 COLL VENOUS BLD VENIPUNCTURE: CPT

## 2023-12-01 PROCEDURE — 6370000000 HC RX 637 (ALT 250 FOR IP): Performed by: NURSE PRACTITIONER

## 2023-12-01 PROCEDURE — 99232 SBSQ HOSP IP/OBS MODERATE 35: CPT

## 2023-12-01 RX ORDER — OXYCODONE HYDROCHLORIDE 5 MG/1
5 TABLET ORAL EVERY 6 HOURS PRN
Qty: 28 TABLET | Refills: 0 | Status: SHIPPED | OUTPATIENT
Start: 2023-12-01 | End: 2023-12-08

## 2023-12-01 RX ORDER — AMIODARONE HYDROCHLORIDE 200 MG/1
200 TABLET ORAL DAILY
Qty: 30 TABLET | Refills: 0 | Status: SHIPPED | OUTPATIENT
Start: 2023-12-01 | End: 2023-12-31

## 2023-12-01 RX ORDER — AMIODARONE HYDROCHLORIDE 400 MG/1
400 TABLET ORAL DAILY
Qty: 5 TABLET | Refills: 0 | Status: SHIPPED | OUTPATIENT
Start: 2023-12-02 | End: 2023-12-07

## 2023-12-01 RX ORDER — PANTOPRAZOLE SODIUM 40 MG/1
40 TABLET, DELAYED RELEASE ORAL
Qty: 30 TABLET | Refills: 0 | Status: SHIPPED | OUTPATIENT
Start: 2023-12-02 | End: 2024-01-01

## 2023-12-01 RX ADMIN — OXYCODONE 10 MG: 5 TABLET ORAL at 06:35

## 2023-12-01 RX ADMIN — AMIODARONE HYDROCHLORIDE 400 MG: 200 TABLET ORAL at 10:03

## 2023-12-01 RX ADMIN — SUCRALFATE 1 G: 1 TABLET ORAL at 06:34

## 2023-12-01 RX ADMIN — PANTOPRAZOLE SODIUM 40 MG: 40 TABLET, DELAYED RELEASE ORAL at 06:34

## 2023-12-01 RX ADMIN — OXYCODONE 10 MG: 5 TABLET ORAL at 11:23

## 2023-12-01 RX ADMIN — SUCRALFATE 1 G: 1 TABLET ORAL at 13:10

## 2023-12-01 ASSESSMENT — PAIN SCALES - GENERAL
PAINLEVEL_OUTOF10: 7
PAINLEVEL_OUTOF10: 4
PAINLEVEL_OUTOF10: 8

## 2023-12-01 ASSESSMENT — PAIN DESCRIPTION - LOCATION
LOCATION: GENERALIZED
LOCATION: SHOULDER;BACK
LOCATION: GENERALIZED

## 2023-12-01 NOTE — PLAN OF CARE
Problem: Discharge Planning  Goal: Discharge to home or other facility with appropriate resources  12/1/2023 1417 by Padmini Claudio RN  Outcome: Progressing  12/1/2023 0415 by Justus Durán RN  Outcome: Progressing     Problem: Skin/Tissue Integrity  Goal: Absence of new skin breakdown  Description: 1. Monitor for areas of redness and/or skin breakdown  2. Assess vascular access sites hourly  3. Every 4-6 hours minimum:  Change oxygen saturation probe site  4. Every 4-6 hours:  If on nasal continuous positive airway pressure, respiratory therapy assess nares and determine need for appliance change or resting period.   12/1/2023 1417 by Padmini Claudio RN  Outcome: Progressing  12/1/2023 0415 by Justus Durán RN  Outcome: Progressing     Problem: Safety - Adult  Goal: Free from fall injury  12/1/2023 1417 by Padmini Claudio RN  Outcome: Progressing  12/1/2023 0415 by Justus Durán RN  Outcome: Progressing     Problem: Pain  Goal: Verbalizes/displays adequate comfort level or baseline comfort level  12/1/2023 1417 by Padmini Claudio RN  Outcome: Progressing  12/1/2023 0415 by Justus Durán RN  Outcome: Progressing     Problem: Nutrition Deficit:  Goal: Optimize nutritional status  Outcome: Progressing     Problem: Cardiovascular - Adult  Goal: Maintains optimal cardiac output and hemodynamic stability  Outcome: Progressing  Goal: Absence of cardiac dysrhythmias or at baseline  Outcome: Progressing     Problem: Skin/Tissue Integrity - Adult  Goal: Skin integrity remains intact  Outcome: Progressing     Problem: Musculoskeletal - Adult  Goal: Return mobility to safest level of function  Outcome: Progressing     Problem: Gastrointestinal - Adult  Goal: Minimal or absence of nausea and vomiting  Outcome: Progressing  Goal: Maintains or returns to baseline bowel function  Outcome: Progressing

## 2023-12-01 NOTE — PLAN OF CARE
CHF Care Plan      Patient's EF (Ejection Fraction) is less than 40%    Heart Failure Medications:  Diuretics[de-identified] None    (One of the following REQUIRED for EF </= 40%/SYSTOLIC FAILURE but MAY be used in EF% >40%/DIASTOLIC FAILURE)        ACE[de-identified] None        ARB[de-identified] None         ARNI[de-identified] None    (Beta Blockers)  NON- Evidenced Based Beta Blocker (for EF% >40%/DIASTOLIC FAILURE): None    Evidenced Based Beta Blocker::(REQUIRED for EF% <40%/SYSTOLIC FAILURE) None  . .................................................................................................................................................. Patient's weights and intake/output reviewed yes    Daily Weight log at bedside, patient/family participation in use of log: \"yes    Patient's current weight today shows a difference of 0 lbs more than last documented weight. Intake/Output Summary (Last 24 hours) at 12/1/2023 1417  Last data filed at 12/1/2023 1250  Gross per 24 hour   Intake 1320 ml   Output 850 ml   Net 470 ml       Education Booklet Provided: yes    Comorbidities Reviewed Yes    Patient has a past medical history of Muscular dystrophy ([address]). >>For CHF and Comorbidity documentation on Education Time and Topics, please see Education Tab    Progressive Mobility Assessment:  What is this patient's Current Level of Mobility?: Wheelchair    Dependent  How was this patient Mobilized today?: Edge of Bed,  Up to Toilet/Shower, Up in Room, Up in Js, Unable to Mobilize, and Patient Refuses to Mobilize, ambulated 0 ft                 With Whom? Nurse and PCA                 Level of Difficulty/Assistance: 1x Assist     Pt resting in bed at this time on room air. Pt with complaints of shortness of breath. Pt without lower extremity edema.      Patient and/or Family's stated Goal of Care this Admission: reduce shortness of breath, increase activity tolerance, better understand heart failure and disease management, be more comfortable, and reduce lower extremity edema prior to discharge        :

## 2023-12-01 NOTE — PROGRESS NOTES
Physical Therapy  Facility/Department: [facility] C4 PCU  Daily Treatment Note  NAME: Alexander Simental  : 1985  MRN: [id number]    Date of Service: 2023    Discharge Recommendations:  24 hour supervision or assist, Home with Home health PT   PT Equipment Recommendations  Equipment Needed: No    Patient Diagnosis(es): The primary encounter diagnosis was Ventricular tachyarrhythmia ([address] Diagnoses of Chest pain, unspecified type, Ischemic cardiomyopathy, and Elevated troponin were also pertinent to this visit. Assessment   Assessment: pt found on commode upon therapist entry. pt requires CGA for transfers from commode and into bed. pt continues to politely decline IPR services, stating he feels confident in his ability to transfer to and frmo his wc and propel his wc at home. pt limited by c/o low back pain but tolerated supine HEP. rec home with 24/7 initially and HHPT if agreeable. DME noen  Activity Tolerance: Patient tolerated treatment well  Equipment Needed: No     Plan    Physical Therapy Plan  General Plan: 3-5 times per week  Current Treatment Recommendations: Strengthening;ROM;Balance training;Functional mobility training;Transfer training;Gait training; Endurance training;Home exercise program;Equipment evaluation, education, & procurement; Therapeutic activities; Safety education & training;Neuromuscular re-education;Stair training;Pain management     Restrictions  Restrictions/Precautions  Restrictions/Precautions: General Precautions, Up as Tolerated  Required Braces or Orthoses?: No  Position Activity Restriction  Other position/activity restrictions: tele, LUE pacemaker precautions: 1) Bedrest x 6 hours then ambulate only if there is no evidence of failure to sense, capture or pace    2) Do not position patient on affected side during bedrest  3) Do not elevate affected arm above shoulder for 30 days    4) Instruct patient to keep wound clean and dry and no showering for 5 days     Subjective

## 2023-12-01 NOTE — PLAN OF CARE
Problem: Discharge Planning  Goal: Discharge to home or other facility with appropriate resources  12/1/2023 0415 by Rosemary Whitehead RN  Outcome: Progressing  11/30/2023 1855 by Matt Copeland RN  Outcome: Progressing  Flowsheets (Taken 11/30/2023 0820)  Discharge to home or other facility with appropriate resources:   Identify barriers to discharge with patient and caregiver   Arrange for needed discharge resources and transportation as appropriate   Identify discharge learning needs (meds, wound care, etc)   Refer to discharge planning if patient needs post-hospital services based on physician order or complex needs related to functional status, cognitive ability or social support system     Problem: Skin/Tissue Integrity  Goal: Absence of new skin breakdown  Description: 1. Monitor for areas of redness and/or skin breakdown  2. Assess vascular access sites hourly  3. Every 4-6 hours minimum:  Change oxygen saturation probe site  4. Every 4-6 hours:  If on nasal continuous positive airway pressure, respiratory therapy assess nares and determine need for appliance change or resting period.   12/1/2023 0415 by Rosemary Whitehead RN  Outcome: Progressing  11/30/2023 1855 by Matt Copeland RN  Outcome: Progressing     Problem: Safety - Adult  Goal: Free from fall injury  12/1/2023 0415 by Rosemary Whitehead RN  Outcome: Progressing  11/30/2023 1855 by Matt Copeland RN  Outcome: Progressing     Problem: Pain  Goal: Verbalizes/displays adequate comfort level or baseline comfort level  12/1/2023 0415 by Rosemary Whitehead RN  Outcome: Progressing  11/30/2023 1855 by Matt Copeland RN  Outcome: Progressing     Problem: Nutrition Deficit:  Goal: Optimize nutritional status  11/30/2023 1855 by Matt Copeland RN  Outcome: Progressing  Flowsheets (Taken 11/30/2023 0934 by Kuldeep Solomon, MS, RD, LD)  Nutrient intake appropriate for improving, restoring, or maintaining nutritional needs:   Assess nutritional

## 2023-12-01 NOTE — PROGRESS NOTES
Hospital Medicine Progress Note      Date of Admission: 11/25/2023  Hospital Day: 7    Chief Admission Complaint:  Chest Pain    Subjective:  no new c/o. Presenting Admission History:         \"Alex Maya is a 42 y.o. male with pmh of marijuana use, former heroin abuse(used to snort never use injected heroin) complex cardiac disease with A-fib on Eliquis, myotonic dystrophy who presents with evaluation of chest pain. Patient reports having intermittent chest pain since last 1 week. On EMS evaluation patient was found to be in V. tach, he was found to be hypoxic in the 80s on room air started on 4 L cannula     In the ER, EKG showed V. tach with heart rate in 140s otherwise hemodynamically stable. Electrolytes including magnesium and potassium were replaced and patient was started on amiodarone drip after contacting EP. Subsequently patient became hypotensive after which amiodarone was discontinued and patient received bolus of fluid. On my examination laying comfortably not in acute distress. Denies any chest pain or palpitations. Amiodarone is off,  systolic blood pressure in the 90s, telemetry with paced rhythm\"    Assessment/Plan:      Current Principal Problem:  Ventricular tachycardia (HCC)    Ventricular Tachycardia -  POArrival.  Cardiology EP consulted and appreciated s/p upgrade to biventricular ICD on Tuesday 28 Nov complicated by chest wall hematoma - Eliquis held, w/ plan to restart soon as hematoma improving w/out intervention. Plan for Life Vest at discharge. Troponin elevation - c/w myocardial injury 2nd to      [] STEMI/NSTEMI  [x] Trop leak 2nd to tachycardia  [x] Chronic Myocardial Injury 2nd to CHF  [] Recent MI (with 4 weeks of admission) dated:     of unclear clinical significance w/out signs/sxs of active ischemia. Documented and reviewed. Acute Respiratory Failure - w/ hypoxia, POArrival, likely 2nd to acute pulmonary edema 2nd to arrythmia.   Presence of clinical input(s): \"LABA1C\" in the last 72 hours. No results for input(s): \"AST\", \"ALT\", \"BILIDIR\", \"BILITOT\", \"ALKPHOS\" in the last 72 hours. No results for input(s): \"INR\", \"LACTA\", \"TSH\" in the last 72 hours. Urine Cultures: No results found for: \"LABURIN\"  Blood Cultures:   Lab Results   Component Value Date/Time    BC No Growth after 4 days of incubation. 07/31/2023 12:46 PM     Lab Results   Component Value Date/Time    BLOODCULT2 No Growth after 4 days of incubation.  07/31/2023 12:16 PM     Organism: No results found for: \"ORG\"      Randy Chavez MD

## 2023-12-01 NOTE — CARE COORDINATION
CASE MANAGEMENT DISCHARGE SUMMARY      Discharge to: home, no needs       IMM given: (date) NA         Transportation:    Family/car:        Confirmed discharge plan with:     Patient: yes      Family:  no- pt will call           RN, name: Jacquelyn

## 2023-12-01 NOTE — PLAN OF CARE
Patient discharged home, iv removed, tele box returned and cmu aware. Patient verbalized understanding of dc and follow up instructions. Meds to bed, wheeled out by family on his own wheel chair.

## 2023-12-02 NOTE — DISCHARGE SUMMARY
Hospital Medicine Discharge Summary    Patient: Kristan Krabbe   : 1985     Admit Date: 2023   Discharge Date: 2023    Disposition:  [x]Home   []HHC  []SNF  []Acute Rehab  []LTAC  []Hospice  Code status:  [x]Full  []DNR/CCA  []Limited (DNR/CCA with Do Not Intubate)  []DNRCC  Condition at Discharge: Stable  Primary Care Provider: Saturnino Oropeza MD    Admitting Provider: Kimberly Mcghee MD  Discharge Provider: Emi Olsen MD     Discharge Diagnoses: Active Hospital Problems    Diagnosis     Marijuana use [F12.90]     Normal anion gap metabolic acidosis [Q55.21]     PAF (paroxysmal atrial fibrillation) (HCC) [I48.0]     Ventricular tachycardia (HCC) [I47.20]     Biatrial enlargement [I51.7]     PFO (patent foramen ovale) [Q21.12]     Pulmonary HTN (HCC) [I27.20]     Suspected sleep apnea [R29.818]     Elevated troponin [R79.89]     Pulmonary venous congestion [R09.89]     Current smoker [F17.200]     NICM (nonischemic cardiomyopathy) ([address]) [I42.8]     Muscular dystrophy (HCC) [G71.00]     CHB (complete heart block) ([address]) [I44.2]        Presenting Admission History:        \"Alex Garcia is a 42 y.o. male with pmh of marijuana use, former heroin abuse(used to snort never use injected heroin) complex cardiac disease with A-fib on Eliquis, myotonic dystrophy who presents with evaluation of chest pain. Patient reports having intermittent chest pain since last 1 week. On EMS evaluation patient was found to be in V. tach, he was found to be hypoxic in the 80s on room air started on 4 L cannula     In the ER, EKG showed V. tach with heart rate in 140s otherwise hemodynamically stable. Electrolytes including magnesium and potassium were replaced and patient was started on amiodarone drip after contacting EP. Subsequently patient became hypotensive after which amiodarone was discontinued and patient received bolus of fluid. On my examination laying comfortably not in acute distress. pelvis was performed without the administration of intravenous contrast. Multiplanar reformatted images are provided for review. Automated exposure control, iterative reconstruction, and/or weight based adjustment of the mA/kV was utilized to reduce the radiation dose to as low as reasonably achievable. COMPARISON: None. HISTORY: ORDERING SYSTEM PROVIDED HISTORY: acute RUQ pain TECHNOLOGIST PROVIDED HISTORY: Reason for exam:->acute RUQ pain Additional Contrast?->None Reason for Exam: acute right sided abd pain, nausea d/t pain per pt FINDINGS: Lower Chest: Cardiomegaly. Pacemaker leads noted. No evident pericardial effusion. Small layering right pleural effusion. Minimal bibasilar atelectasis. Organs: Limited evaluation due lack of intravenous contrast.  Prior cholecystectomy. No biliary ductal dilatation. Unenhanced liver, pancreas, spleen, right adrenal gland, and left kidney are unremarkable. Right renal atrophy is noted. There is no hydronephrosis or urinary tract calculus. Stable 1.5 cm left adrenal nodule measuring less than 10 Hounsfield units consistent with a benign adenoma requiring no follow-up imaging. GI/Bowel: Wall thickening and mucosal hyperemia of the descending and proximal transverse duodenum with surrounding fat stranding. No small bowel obstruction. Normal appendix. Sigmoid diverticulosis. Pelvis: The urinary bladder and pelvic organs are unremarkable. Peritoneum/Retroperitoneum: Scattered small amounts of intraperitoneal free fluid. No focal fluid collection or free air. Normal abdominal aortic caliber. No abnormal lymph node. Bones/Soft Tissues: No acute osseous abnormality. Anasarca. 1. Findings of acute uncomplicated duodenitis. 2. Small layering right pleural effusion. 3. Scattered small amounts of nonspecific intraperitoneal free fluid. 4. Anasarca. 5. Sigmoid diverticulosis.      XR CHEST PORTABLE    Result Date: 11/25/2023  EXAMINATION: ONE XRAY VIEW OF THE CHEST

## 2023-12-04 ENCOUNTER — FOLLOWUP TELEPHONE ENCOUNTER (OUTPATIENT)
Dept: TELEMETRY | Age: 38
End: 2023-12-04

## 2023-12-04 NOTE — TELEPHONE ENCOUNTER
Writer made attempts at hospital follow up. Pt phone states not accepting calls at this time.  Kirit Vila RN

## 2023-12-04 NOTE — TELEPHONE ENCOUNTER
Attempted to call the patient, number listed is not a working number. Will attempt to call the patient again later.

## 2023-12-07 NOTE — TELEPHONE ENCOUNTER
Called Pt's EC and got his new number. [phone]-updated in chart. Pt scheduled for Hospital follow up, doesn't want to do sleep study before hand.

## 2023-12-08 ENCOUNTER — TELEPHONE (OUTPATIENT)
Dept: CARDIOLOGY CLINIC | Age: 38
End: 2023-12-08

## 2023-12-08 ENCOUNTER — NURSE ONLY (OUTPATIENT)
Dept: CARDIOLOGY CLINIC | Age: 38
End: 2023-12-08

## 2023-12-08 RX ORDER — FUROSEMIDE 20 MG/1
20 TABLET ORAL DAILY
Qty: 35 TABLET | Refills: 2 | Status: SHIPPED | OUTPATIENT
Start: 2023-12-08

## 2023-12-08 RX ORDER — DOXYCYCLINE HYCLATE 100 MG
100 TABLET ORAL 2 TIMES DAILY
Qty: 14 TABLET | Refills: 0 | Status: SHIPPED | OUTPATIENT
Start: 2023-12-08 | End: 2023-12-15

## 2023-12-08 NOTE — PROGRESS NOTES
Device check completed by [employer] rep. Site check:   Incision not well approximated-Large about of dried blood and small openings noted to incision. Moderate amount of swelling noted. Site evaluated by Santino Banks and [address] [place] [address]. Steri-strips removed per NPKK/AGK, some strips left due to location/dried into incision. NPKK/KALYNK to send in antibiotics (see telephone encounter.) [address] [place] [address] recommended covering site temporarily with gauze and paper tape, patient to remove and leave open to air when he gets home. Per [address] [place] [address] patient to come back on Wednesday 12/13/2023 while Loreta Lovelace are in office to re-evaluate site. Patient educated on post op instructions and given print out education for post op care.

## 2023-12-08 NOTE — PATIENT INSTRUCTIONS
New Cardiac Device Implant (Pacemaker and/or Defibrillator) Post Op Instructions  Bathe with water indirectly hitting the incision site. Water and soap may run over the incision site. Do not scrub. Pat dry with a clean towel after bathing. Leave incision open to the air; do not apply any dressings, ointments, or bandages to the area. Do not apply lotion, perfume/cologne, or powders to the area until it is completely healed. Any scabbing or skin glue that is noted will fall off within 1-2 weeks after the post op appointment. If any oozing, bleeding, or pus drainage occurs, please call the office immediately at [phone]. Patient has movement restrictions in place until 4 weeks post op (to the day of implant) unless otherwise instructed by physician. Patient may not lift the device side arm above shoulder height. Do not far reach or stretch across body or behind body with effected side. Do not use this arm for pushing, pulling, or lifting body. Do not use cane on the effected side. Patient may not lift anything heavier than a gallon of milk with the associated arm. Appointments to expect going forward:  Post operatively the patient will have had a 1-week post op check, a 1 month follow up with NP/MD, and a 3 month follow up with NP/MD and the device clinic. Remote Monitoring Instructions    Within 2-3 weeks of your device being implanted, you will receive a call from the  of your device. Please answer this call as it is to set up remote monitoring for your device. Once you receive your in-home monitor, please follow the instructions provided to sync the home monitor to your implanted device. Once you have paired your home monitor to your implanted device, keep your monitor plugged in within 6 feet of where you sleep. Your monitor will routinely check in with your device during sleep hours and transmit any urgent events to the [address] [place] [address] for review.      Please do not

## 2023-12-08 NOTE — TELEPHONE ENCOUNTER
Discussed results with patient, follow up for outpatient event monitor if palpitations persist.   Ean Sterling saw patient while in for device check/site check. Furosemide sent in per Ean Sterling request. Ean Sterling checking with [employer] in regards to moving up 1/2024 [employer] appointment. Doxycycline 100mg BID x 7 days sent in per [facility]/NPKK.

## 2023-12-13 ENCOUNTER — NURSE ONLY (OUTPATIENT)
Dept: CARDIOLOGY CLINIC | Age: 38
End: 2023-12-13

## 2023-12-13 PROBLEM — E78.5 HYPERLIPIDEMIA: Status: ACTIVE | Noted: 2023-12-13

## 2023-12-13 NOTE — PROGRESS NOTES
Patient presents to the device clnic for a second site check s/p device placement. Site reviewed by [facility] and Dr. Jayshree Jerry. Per Dr. Jayshree Jerry, site healing nicely. Debris from steri strips removed from incision line. Patient is to resume Eliquis. F/u in 2 weeks for repeat site check. Patient v/u.

## 2023-12-25 PROBLEM — R79.89 ELEVATED TROPONIN: Status: RESOLVED | Noted: 2023-11-25 | Resolved: 2023-12-25

## 2023-12-27 ENCOUNTER — NURSE ONLY (OUTPATIENT)
Dept: CARDIOLOGY CLINIC | Age: 38
End: 2023-12-27

## 2023-12-27 NOTE — PROGRESS NOTES
Patient presents to the Device clinic for repeat site check s/p device placement. Incision site is healing nicely. F/u as scheduled.

## 2023-12-29 PROCEDURE — 93297 REM INTERROG DEV EVAL ICPMS: CPT | Performed by: NURSE PRACTITIONER

## 2023-12-29 PROCEDURE — G2066 INTER DEVC REMOTE 30D: HCPCS | Performed by: NURSE PRACTITIONER

## 2024-01-19 RX ORDER — METOPROLOL SUCCINATE 100 MG/1
100 TABLET, EXTENDED RELEASE ORAL DAILY
Qty: 30 TABLET | Refills: 5 | Status: SHIPPED | OUTPATIENT
Start: 2024-01-19

## 2024-01-19 NOTE — TELEPHONE ENCOUNTER
Called and spoke with patient and relayed KXA message. Patient gave V/U.   Patient scheduled for BP check after increase of metoprolol 1/24/2024 @ 11AM  Patient scheduled for KXA OV/device check 2/12/2024 @ 11AM.     KXA-medication pending for signoff

## 2024-01-19 NOTE — TELEPHONE ENCOUNTER
Marta Mckeon MD  [facility] Ep9 minutes ago (1:39 PM)       Would increase metoprolol to 100 mg daily. Have his BP checked next week on higher dose to make sure not too low. Arrange visit with me in one month.

## 2024-01-19 NOTE — TELEPHONE ENCOUNTER
Patient has a Medtronic CRT-D. Received CareAlert via [employer]. Patient is having 96.5% AT/ AF burden, lots of NSVT and multiple episodes of VT treated with ATP (no shocks). On amiodarone, Toprol and eliquis. Sent, via MURJ, to Dr. Mckeon for review. Has an EP follow with ABUNDIO NP on 2/28/24. Thanks!

## 2024-01-29 PROCEDURE — 93297 REM INTERROG DEV EVAL ICPMS: CPT | Performed by: NURSE PRACTITIONER

## 2024-01-31 ENCOUNTER — HOSPITAL ENCOUNTER (OUTPATIENT)
Age: 39
Discharge: HOME | End: 2024-01-31
Payer: MEDICAID

## 2024-01-31 ENCOUNTER — OFFICE VISIT (OUTPATIENT)
Dept: CARDIOLOGY CLINIC | Age: 39
End: 2024-01-31
Payer: MEDICAID

## 2024-01-31 VITALS
HEIGHT: 65 IN | HEART RATE: 54 BPM | SYSTOLIC BLOOD PRESSURE: 110 MMHG | DIASTOLIC BLOOD PRESSURE: 74 MMHG | WEIGHT: 160 LBS | OXYGEN SATURATION: 97 % | BODY MASS INDEX: 26.66 KG/M2

## 2024-01-31 DIAGNOSIS — I50.22 CHRONIC SYSTOLIC CONGESTIVE HEART FAILURE (HCC): ICD-10-CM

## 2024-01-31 DIAGNOSIS — D73.5 SPLENIC INFARCT: ICD-10-CM

## 2024-01-31 DIAGNOSIS — Z95.810 CARDIAC RESYNCHRONIZATION THERAPY DEFIBRILLATOR (CRT-D) IN PLACE: ICD-10-CM

## 2024-01-31 DIAGNOSIS — I47.20 VENTRICULAR TACHYCARDIA (HCC): ICD-10-CM

## 2024-01-31 DIAGNOSIS — N28.0 RENAL INFARCT (HCC): ICD-10-CM

## 2024-01-31 DIAGNOSIS — I42.8 NICM (NONISCHEMIC CARDIOMYOPATHY) (HCC): ICD-10-CM

## 2024-01-31 DIAGNOSIS — I44.2 CHB (COMPLETE HEART BLOCK) (HCC): ICD-10-CM

## 2024-01-31 DIAGNOSIS — I42.8 NICM (NONISCHEMIC CARDIOMYOPATHY) (HCC): Primary | ICD-10-CM

## 2024-01-31 DIAGNOSIS — F17.200 CURRENT SMOKER: ICD-10-CM

## 2024-01-31 DIAGNOSIS — G71.00 MUSCULAR DYSTROPHY (HCC): ICD-10-CM

## 2024-01-31 DIAGNOSIS — I48.11 LONGSTANDING PERSISTENT ATRIAL FIBRILLATION (HCC): ICD-10-CM

## 2024-01-31 LAB
ALBUMIN SERPL-MCNC: 4.3 G/DL (ref 3.4–5)
ALBUMIN/GLOB SERPL: 1.3 {RATIO} (ref 1.1–2.2)
ALP SERPL-CCNC: 73 U/L (ref 40–129)
ALT SERPL-CCNC: 25 U/L (ref 10–40)
ANION GAP SERPL CALCULATED.3IONS-SCNC: 10 MMOL/L (ref 3–16)
AST SERPL-CCNC: 31 U/L (ref 15–37)
BILIRUB SERPL-MCNC: 0.4 MG/DL (ref 0–1)
BUN SERPL-MCNC: 26 MG/DL (ref 7–20)
CALCIUM SERPL-MCNC: 8.6 MG/DL (ref 8.3–10.6)
CHLORIDE SERPL-SCNC: 104 MMOL/L (ref 99–110)
CO2 SERPL-SCNC: 22 MMOL/L (ref 21–32)
CREAT SERPL-MCNC: 0.8 MG/DL (ref 0.9–1.3)
DEPRECATED RDW RBC AUTO: 15.5 % (ref 12.4–15.4)
GFR SERPLBLD CREATININE-BSD FMLA CKD-EPI: >60 ML/MIN/{1.73_M2}
GLUCOSE SERPL-MCNC: 96 MG/DL (ref 70–99)
HCT VFR BLD AUTO: 47.1 % (ref 40.5–52.5)
HGB BLD-MCNC: 16.1 G/DL (ref 13.5–17.5)
MCH RBC QN AUTO: 30 PG (ref 26–34)
MCHC RBC AUTO-ENTMCNC: 34.2 G/DL (ref 31–36)
MCV RBC AUTO: 87.7 FL (ref 80–100)
NT-PROBNP SERPL-MCNC: 1876 PG/ML (ref 0–124)
PLATELET # BLD AUTO: 206 K/UL (ref 135–450)
PMV BLD AUTO: 8.8 FL (ref 5–10.5)
POTASSIUM SERPL-SCNC: 4.2 MMOL/L (ref 3.5–5.1)
PROT SERPL-MCNC: 7.6 G/DL (ref 6.4–8.2)
RBC # BLD AUTO: 5.37 M/UL (ref 4.2–5.9)
SODIUM SERPL-SCNC: 136 MMOL/L (ref 136–145)
TSH SERPL DL<=0.005 MIU/L-ACNC: 2.17 UIU/ML (ref 0.27–4.2)
WBC # BLD AUTO: 7.3 K/UL (ref 4–11)

## 2024-01-31 PROCEDURE — 85027 COMPLETE CBC AUTOMATED: CPT

## 2024-01-31 PROCEDURE — 80053 COMPREHEN METABOLIC PANEL: CPT

## 2024-01-31 PROCEDURE — 84443 ASSAY THYROID STIM HORMONE: CPT

## 2024-01-31 PROCEDURE — 83880 ASSAY OF NATRIURETIC PEPTIDE: CPT

## 2024-01-31 PROCEDURE — 99214 OFFICE O/P EST MOD 30 MIN: CPT | Performed by: NURSE PRACTITIONER

## 2024-01-31 PROCEDURE — 36415 COLL VENOUS BLD VENIPUNCTURE: CPT

## 2024-01-31 RX ORDER — AMIODARONE HYDROCHLORIDE 200 MG/1
200 TABLET ORAL DAILY
Qty: 30 TABLET | Refills: 0 | Status: ON HOLD | OUTPATIENT
Start: 2024-01-31 | End: 2024-02-08 | Stop reason: HOSPADM

## 2024-01-31 RX ORDER — PANTOPRAZOLE SODIUM 40 MG/1
40 TABLET, DELAYED RELEASE ORAL
Qty: 30 TABLET | Refills: 0 | Status: SHIPPED | OUTPATIENT
Start: 2024-01-31 | End: 2024-03-01

## 2024-01-31 NOTE — PATIENT INSTRUCTIONS
Restart the amiodarone 200 mg daily  Stay on the metoprolol (Toprol XL) 100 mg once daily  No change in the Entresto 1.5 tablets twice daily  Change the furosemide (Lasix) 20 mg only on Monday, Wednesday and Friday  Blood work today - does not need to be fasting  Keep appointment with Dr. Marta Mckeon in 10 days  Follow up with me in ~ 2 months

## 2024-01-31 NOTE — PROGRESS NOTES
[facility]   Cardiac Evaluation    Primary Care Doctor:  Amadou Rodriguez MD    Chief Complaint   Patient presents with    1 Month Follow-Up    Atrial Fibrillation    Tachycardia        Assessment:    1. NICM (nonischemic cardiomyopathy) (HCC)    2. Ventricular tachycardia (HCC)    3. CHB (complete heart block) (HCC)    4. Longstanding persistent atrial fibrillation (HCC)    5. Cardiac resynchronization therapy defibrillator (CRT-D) in place MEDTRONIC 11/28/23    6. Chronic systolic congestive heart failure (HCC)    7. Muscular dystrophy (HCC)    8. Splenic infarct    9. Renal infarct (HCC)    10. Current smoker        Plan:   Restart the amiodarone 200 mg daily  Stay on the metoprolol (Toprol XL) 100 mg once daily  No change in the Entresto 1.5 tablets twice daily  Change the furosemide (Lasix) 20 mg only on Monday, Wednesday and Friday  Blood work today - does not need to be fasting  Keep appointment with Dr. Marta Mckoen in 10 days  Follow up with me in ~ 2 months     Vitals:    01/31/24 1033   BP: 110/74   Pulse: 54   SpO2: 97%   Weight: 72.6 kg (160 lb)   Height: 1.651 m (5' 5\")       Primary Cardiologist: Dr. Shilo Michael / Dr. Marat Mckeon    History of Present Illness:   I had the pleasure of seeing Alex Isaac (38 y.o.) in follow up for nonischemic cardiomyopathy, systolic CHF, complete heart block, atrial fibrillation, VT s/p BiV ICD, splenic and renal infarct on anticoagulation, muscular dystrophy, and history of drug use.  His Toprol-XL was increased to 100 mg daily for further NSVT noted on device.    He is not taking amiodarone, does not have at home.  Upon review, medicine was prescribed by hospitalist with only 30 days, no refills.   He is taking the increased dose of metoprolol  He admits to have a throbbing in upper abdomen (arrhythmia?).  He also has some stomach discomfort.   He feels stronger, less weak/ fatigued.   He is taking the Lasix 4 days per week.   Smokes 1-2 cig per day.

## 2024-02-01 ENCOUNTER — TELEPHONE (OUTPATIENT)
Dept: CARDIOLOGY CLINIC | Age: 39
End: 2024-02-01

## 2024-02-01 NOTE — TELEPHONE ENCOUNTER
----- Message from DEB Card - CNP sent at 2/1/2024  6:55 AM EST -----  BNP much improved.  Kidney and liver function panel stable.  Blood count also much improved.  Thyroid normal.  Continue with plan as we discussed in office yesterday.  Thank you, Reyna

## 2024-02-05 ENCOUNTER — APPOINTMENT (OUTPATIENT)
Dept: GENERAL RADIOLOGY | Age: 39
DRG: 201 | End: 2024-02-05
Payer: MEDICAID

## 2024-02-05 ENCOUNTER — PROCEDURE VISIT (OUTPATIENT)
Dept: CARDIOLOGY CLINIC | Age: 39
End: 2024-02-05
Payer: MEDICAID

## 2024-02-05 ENCOUNTER — HOSPITAL ENCOUNTER (INPATIENT)
Age: 39
LOS: 3 days | Discharge: HOME | DRG: 201 | End: 2024-02-08
Attending: STUDENT IN AN ORGANIZED HEALTH CARE EDUCATION/TRAINING PROGRAM | Admitting: INTERNAL MEDICINE
Payer: MEDICAID

## 2024-02-05 ENCOUNTER — TELEPHONE (OUTPATIENT)
Dept: CARDIOLOGY CLINIC | Age: 39
End: 2024-02-05

## 2024-02-05 DIAGNOSIS — I42.8 NICM (NONISCHEMIC CARDIOMYOPATHY) (HCC): ICD-10-CM

## 2024-02-05 DIAGNOSIS — I47.20 VENTRICULAR TACHYARRHYTHMIA (HCC): ICD-10-CM

## 2024-02-05 DIAGNOSIS — Z45.02 AICD DISCHARGE: Primary | ICD-10-CM

## 2024-02-05 DIAGNOSIS — Z95.810 CARDIAC RESYNCHRONIZATION THERAPY DEFIBRILLATOR (CRT-D) IN PLACE: Primary | ICD-10-CM

## 2024-02-05 PROBLEM — T82.198A INAPPROPRIATE SHOCKS FROM ICD (IMPLANTABLE CARDIOVERTER-DEFIBRILLATOR), INITIAL ENCOUNTER: Status: ACTIVE | Noted: 2024-02-05

## 2024-02-05 LAB
ALBUMIN SERPL-MCNC: 4.3 G/DL (ref 3.4–5)
ALBUMIN/GLOB SERPL: 1.5 {RATIO} (ref 1.1–2.2)
ALP SERPL-CCNC: 64 U/L (ref 40–129)
ALT SERPL-CCNC: 24 U/L (ref 10–40)
ANION GAP SERPL CALCULATED.3IONS-SCNC: 8 MMOL/L (ref 3–16)
AST SERPL-CCNC: 23 U/L (ref 15–37)
BASOPHILS # BLD: 0.1 K/UL (ref 0–0.2)
BASOPHILS NFR BLD: 0.9 %
BILIRUB SERPL-MCNC: 0.4 MG/DL (ref 0–1)
BUN SERPL-MCNC: 18 MG/DL (ref 7–20)
CALCIUM SERPL-MCNC: 9 MG/DL (ref 8.3–10.6)
CHLORIDE SERPL-SCNC: 105 MMOL/L (ref 99–110)
CO2 SERPL-SCNC: 26 MMOL/L (ref 21–32)
CREAT SERPL-MCNC: 0.9 MG/DL (ref 0.9–1.3)
DEPRECATED RDW RBC AUTO: 15.9 % (ref 12.4–15.4)
EKG ATRIAL RATE: 69 BPM
EKG DIAGNOSIS: NORMAL
EKG Q-T INTERVAL: 448 MS
EKG QRS DURATION: 158 MS
EKG QTC CALCULATION (BAZETT): 476 MS
EKG R AXIS: 188 DEGREES
EKG T AXIS: 23 DEGREES
EKG VENTRICULAR RATE: 68 BPM
EOSINOPHIL # BLD: 0.1 K/UL (ref 0–0.6)
EOSINOPHIL NFR BLD: 1.9 %
GFR SERPLBLD CREATININE-BSD FMLA CKD-EPI: >60 ML/MIN/{1.73_M2}
GLUCOSE SERPL-MCNC: 90 MG/DL (ref 70–99)
HCT VFR BLD AUTO: 45.8 % (ref 40.5–52.5)
HGB BLD-MCNC: 15.1 G/DL (ref 13.5–17.5)
INR PPP: 1.26 (ref 0.84–1.16)
LYMPHOCYTES # BLD: 1.1 K/UL (ref 1–5.1)
LYMPHOCYTES NFR BLD: 16 %
MCH RBC QN AUTO: 28.1 PG (ref 26–34)
MCHC RBC AUTO-ENTMCNC: 33.1 G/DL (ref 31–36)
MCV RBC AUTO: 85.2 FL (ref 80–100)
MONOCYTES # BLD: 0.4 K/UL (ref 0–1.3)
MONOCYTES NFR BLD: 5.9 %
NEUTROPHILS # BLD: 5.3 K/UL (ref 1.7–7.7)
NEUTROPHILS NFR BLD: 75.3 %
NT-PROBNP SERPL-MCNC: 1881 PG/ML (ref 0–124)
PLATELET # BLD AUTO: 196 K/UL (ref 135–450)
PMV BLD AUTO: 8 FL (ref 5–10.5)
POTASSIUM SERPL-SCNC: 4.4 MMOL/L (ref 3.5–5.1)
PROT SERPL-MCNC: 7.2 G/DL (ref 6.4–8.2)
PROTHROMBIN TIME: 15.8 SEC (ref 11.5–14.8)
RBC # BLD AUTO: 5.38 M/UL (ref 4.2–5.9)
SODIUM SERPL-SCNC: 139 MMOL/L (ref 136–145)
TROPONIN, HIGH SENSITIVITY: 43 NG/L (ref 0–22)
TROPONIN, HIGH SENSITIVITY: 46 NG/L (ref 0–22)
WBC # BLD AUTO: 7 K/UL (ref 4–11)

## 2024-02-05 PROCEDURE — 80053 COMPREHEN METABOLIC PANEL: CPT

## 2024-02-05 PROCEDURE — 85025 COMPLETE CBC W/AUTO DIFF WBC: CPT

## 2024-02-05 PROCEDURE — 83880 ASSAY OF NATRIURETIC PEPTIDE: CPT

## 2024-02-05 PROCEDURE — 99291 CRITICAL CARE FIRST HOUR: CPT | Performed by: INTERNAL MEDICINE

## 2024-02-05 PROCEDURE — 85610 PROTHROMBIN TIME: CPT

## 2024-02-05 PROCEDURE — 2580000003 HC RX 258: Performed by: INTERNAL MEDICINE

## 2024-02-05 PROCEDURE — 2060000000 HC ICU INTERMEDIATE R&B

## 2024-02-05 PROCEDURE — 93284 PRGRMG EVAL IMPLANTABLE DFB: CPT | Performed by: INTERNAL MEDICINE

## 2024-02-05 PROCEDURE — 84484 ASSAY OF TROPONIN QUANT: CPT

## 2024-02-05 PROCEDURE — 99285 EMERGENCY DEPT VISIT HI MDM: CPT

## 2024-02-05 PROCEDURE — 71045 X-RAY EXAM CHEST 1 VIEW: CPT

## 2024-02-05 PROCEDURE — 93010 ELECTROCARDIOGRAM REPORT: CPT | Performed by: INTERNAL MEDICINE

## 2024-02-05 PROCEDURE — 6370000000 HC RX 637 (ALT 250 FOR IP): Performed by: INTERNAL MEDICINE

## 2024-02-05 PROCEDURE — 93005 ELECTROCARDIOGRAM TRACING: CPT | Performed by: STUDENT IN AN ORGANIZED HEALTH CARE EDUCATION/TRAINING PROGRAM

## 2024-02-05 RX ORDER — SODIUM CHLORIDE 9 MG/ML
INJECTION, SOLUTION INTRAVENOUS CONTINUOUS
Status: DISCONTINUED | OUTPATIENT
Start: 2024-02-05 | End: 2024-02-08 | Stop reason: HOSPADM

## 2024-02-05 RX ORDER — ONDANSETRON 4 MG/1
4 TABLET, ORALLY DISINTEGRATING ORAL EVERY 8 HOURS PRN
Status: DISCONTINUED | OUTPATIENT
Start: 2024-02-05 | End: 2024-02-08 | Stop reason: HOSPADM

## 2024-02-05 RX ORDER — SODIUM CHLORIDE 0.9 % (FLUSH) 0.9 %
5-40 SYRINGE (ML) INJECTION PRN
Status: DISCONTINUED | OUTPATIENT
Start: 2024-02-05 | End: 2024-02-08 | Stop reason: HOSPADM

## 2024-02-05 RX ORDER — ONDANSETRON 2 MG/ML
4 INJECTION INTRAMUSCULAR; INTRAVENOUS EVERY 6 HOURS PRN
Status: DISCONTINUED | OUTPATIENT
Start: 2024-02-05 | End: 2024-02-08 | Stop reason: HOSPADM

## 2024-02-05 RX ORDER — PANTOPRAZOLE SODIUM 40 MG/1
40 TABLET, DELAYED RELEASE ORAL
Status: DISCONTINUED | OUTPATIENT
Start: 2024-02-06 | End: 2024-02-08 | Stop reason: HOSPADM

## 2024-02-05 RX ORDER — POTASSIUM CHLORIDE 7.45 MG/ML
10 INJECTION INTRAVENOUS PRN
Status: DISCONTINUED | OUTPATIENT
Start: 2024-02-05 | End: 2024-02-08 | Stop reason: HOSPADM

## 2024-02-05 RX ORDER — POTASSIUM CHLORIDE 20 MEQ/1
40 TABLET, EXTENDED RELEASE ORAL PRN
Status: DISCONTINUED | OUTPATIENT
Start: 2024-02-05 | End: 2024-02-08 | Stop reason: HOSPADM

## 2024-02-05 RX ORDER — BISACODYL 5 MG/1
5 TABLET, DELAYED RELEASE ORAL DAILY PRN
Status: DISCONTINUED | OUTPATIENT
Start: 2024-02-05 | End: 2024-02-08 | Stop reason: HOSPADM

## 2024-02-05 RX ORDER — SODIUM CHLORIDE 9 MG/ML
INJECTION, SOLUTION INTRAVENOUS PRN
Status: DISCONTINUED | OUTPATIENT
Start: 2024-02-05 | End: 2024-02-08 | Stop reason: HOSPADM

## 2024-02-05 RX ORDER — AMIODARONE HYDROCHLORIDE 200 MG/1
200 TABLET ORAL DAILY
Status: DISCONTINUED | OUTPATIENT
Start: 2024-02-05 | End: 2024-02-05 | Stop reason: ALTCHOICE

## 2024-02-05 RX ORDER — NICOTINE 21 MG/24HR
1 PATCH, TRANSDERMAL 24 HOURS TRANSDERMAL DAILY
Status: DISCONTINUED | OUTPATIENT
Start: 2024-02-05 | End: 2024-02-08 | Stop reason: HOSPADM

## 2024-02-05 RX ORDER — AMIODARONE HYDROCHLORIDE 200 MG/1
400 TABLET ORAL 2 TIMES DAILY
Status: DISCONTINUED | OUTPATIENT
Start: 2024-02-05 | End: 2024-02-08

## 2024-02-05 RX ORDER — SODIUM CHLORIDE 0.9 % (FLUSH) 0.9 %
5-40 SYRINGE (ML) INJECTION EVERY 12 HOURS SCHEDULED
Status: DISCONTINUED | OUTPATIENT
Start: 2024-02-05 | End: 2024-02-08 | Stop reason: HOSPADM

## 2024-02-05 RX ORDER — ACETAMINOPHEN 325 MG/1
650 TABLET ORAL EVERY 6 HOURS PRN
Status: DISCONTINUED | OUTPATIENT
Start: 2024-02-05 | End: 2024-02-08 | Stop reason: HOSPADM

## 2024-02-05 RX ORDER — METOPROLOL SUCCINATE 50 MG/1
100 TABLET, EXTENDED RELEASE ORAL DAILY
Status: DISCONTINUED | OUTPATIENT
Start: 2024-02-05 | End: 2024-02-08 | Stop reason: HOSPADM

## 2024-02-05 RX ORDER — ACETAMINOPHEN 650 MG/1
650 SUPPOSITORY RECTAL EVERY 6 HOURS PRN
Status: DISCONTINUED | OUTPATIENT
Start: 2024-02-05 | End: 2024-02-08 | Stop reason: HOSPADM

## 2024-02-05 RX ORDER — MAGNESIUM HYDROXIDE/ALUMINUM HYDROXICE/SIMETHICONE 120; 1200; 1200 MG/30ML; MG/30ML; MG/30ML
30 SUSPENSION ORAL EVERY 6 HOURS PRN
Status: DISCONTINUED | OUTPATIENT
Start: 2024-02-05 | End: 2024-02-08 | Stop reason: HOSPADM

## 2024-02-05 RX ORDER — FUROSEMIDE 20 MG/1
20 TABLET ORAL DAILY PRN
Status: DISCONTINUED | OUTPATIENT
Start: 2024-02-05 | End: 2024-02-08 | Stop reason: HOSPADM

## 2024-02-05 RX ADMIN — SACUBITRIL AND VALSARTAN 1.5 TABLET: 24; 26 TABLET, FILM COATED ORAL at 21:53

## 2024-02-05 RX ADMIN — AMIODARONE HYDROCHLORIDE 400 MG: 200 TABLET ORAL at 17:49

## 2024-02-05 RX ADMIN — ACETAMINOPHEN 650 MG: 325 TABLET ORAL at 21:53

## 2024-02-05 RX ADMIN — Medication 10 ML: at 21:53

## 2024-02-05 RX ADMIN — APIXABAN 5 MG: 5 TABLET, FILM COATED ORAL at 21:53

## 2024-02-05 ASSESSMENT — PAIN - FUNCTIONAL ASSESSMENT: PAIN_FUNCTIONAL_ASSESSMENT: NONE - DENIES PAIN

## 2024-02-05 ASSESSMENT — ENCOUNTER SYMPTOMS
LEFT EYE: 0
WHEEZING: 0
SHORTNESS OF BREATH: 0
HEMATOCHEZIA: 1
RIGHT EYE: 0
ABDOMINAL PAIN: 1
HEMATEMESIS: 0
STRIDOR: 0

## 2024-02-05 ASSESSMENT — PAIN DESCRIPTION - DESCRIPTORS: DESCRIPTORS: ACHING

## 2024-02-05 ASSESSMENT — PAIN DESCRIPTION - LOCATION: LOCATION: HEAD

## 2024-02-05 ASSESSMENT — PAIN SCALES - GENERAL: PAINLEVEL_OUTOF10: 7

## 2024-02-05 ASSESSMENT — PAIN DESCRIPTION - ORIENTATION: ORIENTATION: LEFT

## 2024-02-05 ASSESSMENT — LIFESTYLE VARIABLES: HOW OFTEN DO YOU HAVE A DRINK CONTAINING ALCOHOL: NEVER

## 2024-02-05 NOTE — ED PROVIDER NOTES
treating other patients and teaching time.                   FINAL IMPRESSION      1. AICD discharge    2. Ventricular tachyarrhythmia (HCC)          DISPOSITION/PLAN   Disposition: DISPOSITION Decision To Admit 02/05/2024 01:33:54 PM    Condition: stable     DISCLAIMER: This chart was created using Dragon dictation software.  Efforts were made by me to ensure accuracy, however some errors may be present due to limitations of this technology and occasionally words are not transcribed correctly.    MD Marv Lang Erica J, MD  02/05/24 5873

## 2024-02-05 NOTE — TELEPHONE ENCOUNTER
Patient called in and reported that he got shocked by his ICD. I referred him to [facility] ER.  Bayron MORE.

## 2024-02-05 NOTE — CONSULTS
Placed a consult to cardiology Per Esme Fair MD @[phone]  RE: Seth TENA fired  Called back @[phone]  
Ventricle   The left ventricular systolic function is severely reduced with an ejection   fraction of <20%.   The anterior wall, septal walls, and lateral walls are severely hypokinetic   to akinetic. Inferior walls are hypokinetic.   Left ventricular cavity size is moderately dilated with normal left   ventricular wall thickness.   Indeterminate diastolic function      Mitral Valve   Mild thickening of leaflets of mitral valve.   No mitral stenosis.   Mild mitral regurgitation.   There is some degree of posterior leaflet restriction resulting in an   eccentric posteriorly directed jet.   This may be underestimated.      Left Atrium   The left atrium is moderately dilated.      Aortic Valve   The aortic valve appears structurally normal.   No evidence of aortic valve regurgitation.   No aortic stenosis.      Aorta   The aortic root is normal in size.      Right Ventricle   Normal right ventricular size.   Right ventricular systolic function is mildly reduced .   TAPSE is measured at 13 mm.   S' prime velocity is measured at 5 cm/s.   Pacemaker / ICD lead is visualized in the right ventricle.      Tricuspid Valve   Tricuspid valve is structurally normal.   Mild to moderate tricuspid regurgitation.   Systolic pulmonic artery pressure (SPAP) is estimated at 54 mmHg consistent   with moderate pulmonary hypertension (Right atrial pressure of 8 mmHg).      Right Atrium   The right atrium is moderately dilated in size at 24 cm2.   Pacemaker / ICD lead is visualized in the right atrium.      Pulmonic Valve   The pulmonic valve is not well visualized.   No evidence of pulmonic valve regurgitation.      Pericardial Effusion   No pericardial effusion noted.      Pleural Effusion   No pleural effusion.      Miscellaneous   IVC size is dilated (>2.1 cm) and collapses < 50% with respiration   consistent with elevated RA pressure (15 mmHg).     M-Mode/2D Measurements (cm)      LV Diastolic Dimension: 6.61 cm LV Systolic Dimension:

## 2024-02-05 NOTE — H&P
10/24/23   Reyna Thao, APRN - CNP       Physical Exam:    Physical Exam     General: NAD  Eyes: EOMI  ENT: neck supple  Cardiovascular: Regular rate.  Respiratory: Clear to auscultation  Gastrointestinal: Soft, non tender  Genitourinary: no suprapubic tenderness  Musculoskeletal: No edema  Skin: warm, dry  Neuro: Alert awake oriented x 3 and moving all extremities.  Psych: Mood appropriate.       Past Medical History:   PMHx   Past Medical History:   Diagnosis Date    Muscular dystrophy (HCC)      PSHX:  has a past surgical history that includes Pacemaker insertion.  Allergies:   Allergies   Allergen Reactions    Bee Venom      hives    Onion Angioedema     Fam HX: family history includes Diabetes in his mother.  Soc HX:   Social History     Socioeconomic History    Marital status: Single     Spouse name: None    Number of children: None    Years of education: None    Highest education level: None   Tobacco Use    Smoking status: Some Days     Current packs/day: 0.25     Average packs/day: 0.3 packs/day for 16.1 years (4.0 ttl pk-yrs)     Types: Cigarettes     Start date: 2008   Substance and Sexual Activity    Alcohol use: No     Comment: rarely    Drug use: Yes     Types: Marijuana (Weed)     Social Determinants of Health     Food Insecurity:   Recent Concern: Food Insecurity - Food Insecurity Present (11/25/2023)    Hunger Vital Sign     Worried About Running Out of Food in the Last Year: Sometimes true     Ran Out of Food in the Last Year: Patient declined   Transportation Needs: No Transportation Needs (11/25/2023)    Transportation Problems ([facility] HRSN)   Recent Concern: Transportation Needs - Unmet Transportation Needs (11/25/2023)    PRAPARE - Transportation     Lack of Transportation (Medical): Yes     Lack of Transportation (Non-Medical): Yes   Housing Stability: High Risk (11/25/2023)    Housing Stability Vital Sign     Unable to Pay for Housing in the Last Year: No     Number of Places Lived in the

## 2024-02-06 LAB
ALBUMIN SERPL-MCNC: 3.6 G/DL (ref 3.4–5)
ALBUMIN/GLOB SERPL: 1.4 {RATIO} (ref 1.1–2.2)
ALP SERPL-CCNC: 51 U/L (ref 40–129)
ALT SERPL-CCNC: 20 U/L (ref 10–40)
ANION GAP SERPL CALCULATED.3IONS-SCNC: 7 MMOL/L (ref 3–16)
AST SERPL-CCNC: 15 U/L (ref 15–37)
BASOPHILS # BLD: 0.1 K/UL (ref 0–0.2)
BASOPHILS NFR BLD: 0.9 %
BILIRUB SERPL-MCNC: 0.5 MG/DL (ref 0–1)
BUN SERPL-MCNC: 20 MG/DL (ref 7–20)
CALCIUM SERPL-MCNC: 8.6 MG/DL (ref 8.3–10.6)
CHLORIDE SERPL-SCNC: 107 MMOL/L (ref 99–110)
CO2 SERPL-SCNC: 24 MMOL/L (ref 21–32)
CREAT SERPL-MCNC: 0.7 MG/DL (ref 0.9–1.3)
DEPRECATED RDW RBC AUTO: 15.3 % (ref 12.4–15.4)
EOSINOPHIL # BLD: 0.2 K/UL (ref 0–0.6)
EOSINOPHIL NFR BLD: 2.4 %
GFR SERPLBLD CREATININE-BSD FMLA CKD-EPI: >60 ML/MIN/{1.73_M2}
GLUCOSE SERPL-MCNC: 89 MG/DL (ref 70–99)
HCT VFR BLD AUTO: 39.5 % (ref 40.5–52.5)
HGB BLD-MCNC: 13.3 G/DL (ref 13.5–17.5)
LYMPHOCYTES # BLD: 2 K/UL (ref 1–5.1)
LYMPHOCYTES NFR BLD: 30.7 %
MCH RBC QN AUTO: 28.6 PG (ref 26–34)
MCHC RBC AUTO-ENTMCNC: 33.6 G/DL (ref 31–36)
MCV RBC AUTO: 85.2 FL (ref 80–100)
MONOCYTES # BLD: 0.4 K/UL (ref 0–1.3)
MONOCYTES NFR BLD: 6.4 %
NEUTROPHILS # BLD: 3.8 K/UL (ref 1.7–7.7)
NEUTROPHILS NFR BLD: 59.6 %
PLATELET # BLD AUTO: 172 K/UL (ref 135–450)
PMV BLD AUTO: 8.2 FL (ref 5–10.5)
POTASSIUM SERPL-SCNC: 4.2 MMOL/L (ref 3.5–5.1)
PROT SERPL-MCNC: 6.1 G/DL (ref 6.4–8.2)
RBC # BLD AUTO: 4.64 M/UL (ref 4.2–5.9)
SODIUM SERPL-SCNC: 138 MMOL/L (ref 136–145)
WBC # BLD AUTO: 6.5 K/UL (ref 4–11)

## 2024-02-06 PROCEDURE — 99232 SBSQ HOSP IP/OBS MODERATE 35: CPT

## 2024-02-06 PROCEDURE — 6370000000 HC RX 637 (ALT 250 FOR IP): Performed by: INTERNAL MEDICINE

## 2024-02-06 PROCEDURE — 80053 COMPREHEN METABOLIC PANEL: CPT

## 2024-02-06 PROCEDURE — 85025 COMPLETE CBC W/AUTO DIFF WBC: CPT

## 2024-02-06 PROCEDURE — 36415 COLL VENOUS BLD VENIPUNCTURE: CPT

## 2024-02-06 PROCEDURE — 6360000002 HC RX W HCPCS: Performed by: INTERNAL MEDICINE

## 2024-02-06 PROCEDURE — 6370000000 HC RX 637 (ALT 250 FOR IP): Performed by: NURSE PRACTITIONER

## 2024-02-06 PROCEDURE — 2060000000 HC ICU INTERMEDIATE R&B

## 2024-02-06 PROCEDURE — 2580000003 HC RX 258: Performed by: INTERNAL MEDICINE

## 2024-02-06 RX ORDER — CALCIUM CARBONATE 500 MG/1
500 TABLET, CHEWABLE ORAL 3 TIMES DAILY PRN
Status: DISCONTINUED | OUTPATIENT
Start: 2024-02-06 | End: 2024-02-08 | Stop reason: HOSPADM

## 2024-02-06 RX ADMIN — Medication 10 ML: at 09:20

## 2024-02-06 RX ADMIN — APIXABAN 5 MG: 5 TABLET, FILM COATED ORAL at 09:17

## 2024-02-06 RX ADMIN — Medication 10 ML: at 20:22

## 2024-02-06 RX ADMIN — AMIODARONE HYDROCHLORIDE 400 MG: 200 TABLET ORAL at 09:17

## 2024-02-06 RX ADMIN — METOPROLOL SUCCINATE 100 MG: 50 TABLET, EXTENDED RELEASE ORAL at 09:17

## 2024-02-06 RX ADMIN — APIXABAN 5 MG: 5 TABLET, FILM COATED ORAL at 20:22

## 2024-02-06 RX ADMIN — ONDANSETRON 4 MG: 2 INJECTION INTRAMUSCULAR; INTRAVENOUS at 21:58

## 2024-02-06 RX ADMIN — ANTACID TABLETS 500 MG: 500 TABLET, CHEWABLE ORAL at 22:05

## 2024-02-06 RX ADMIN — AMIODARONE HYDROCHLORIDE 400 MG: 200 TABLET ORAL at 20:20

## 2024-02-06 RX ADMIN — PANTOPRAZOLE SODIUM 40 MG: 40 TABLET, DELAYED RELEASE ORAL at 09:17

## 2024-02-06 RX ADMIN — SACUBITRIL AND VALSARTAN 1.5 TABLET: 24; 26 TABLET, FILM COATED ORAL at 20:19

## 2024-02-06 RX ADMIN — ACETAMINOPHEN 650 MG: 325 TABLET ORAL at 22:04

## 2024-02-06 RX ADMIN — SACUBITRIL AND VALSARTAN 1.5 TABLET: 24; 26 TABLET, FILM COATED ORAL at 09:20

## 2024-02-06 ASSESSMENT — PAIN DESCRIPTION - LOCATION: LOCATION: HEAD

## 2024-02-06 ASSESSMENT — PAIN SCALES - GENERAL: PAINLEVEL_OUTOF10: 7

## 2024-02-06 ASSESSMENT — PAIN DESCRIPTION - DESCRIPTORS: DESCRIPTORS: ACHING

## 2024-02-06 NOTE — ED NOTES
Writer attempted to call report at this time. Charge nurse informed writer that report was okay to be given since a sick patient was en route to this ED. Patient being transported to the floor at this time. A2 nurse reported they would call back.

## 2024-02-06 NOTE — PROGRESS NOTES
Patient admitted to room 219 from ER.  Patient oriented to room, call light, bed rails, phone, lights and bathroom.  Patient instructed about the schedule of the day including: vital sign frequency, lab draws, possible tests, frequency of MD and staff rounds, including RN/MD rounding together at bedside, daily weights, and I &O's.  Patient instructed about prescribed diet, how to use 8MENU, and television.   bed alarm in place, patient aware of placement and reason.   Telemetry box  in place, patient aware of placement and reason.  Bed locked, in lowest position, side rails up 2/4, call light within reach.

## 2024-02-06 NOTE — ED NOTES
Mr. Isaac is a 38 y.o. male who had concerns including AICD Problem (Patient to the ED for defibrillator shock, states this happened about 20-30 minutes ago. Reports that before it went off he felt like a burning in his belly. Denies any chest pain or shortness of breath currently. ).    Chief Complaint   Patient presents with    AICD Problem     Patient to the ED for defibrillator shock, states this happened about 20-30 minutes ago. Reports that before it went off he felt like a burning in his belly. Denies any chest pain or shortness of breath currently.        He is being admitted for:    Inappropriate shocks from ICD (implantable cardioverter-defibrillator), initial encounter    His ED problem list included:    1. AICD discharge    2. Ventricular tachyarrhythmia (HCC)        Past Medical History:   Diagnosis Date    Muscular dystrophy (HCC)        Past Surgical History:   Procedure Laterality Date    PACEMAKER INSERTION         His recent abnormal labs were:    Labs Reviewed   CBC WITH AUTO DIFFERENTIAL - Abnormal; Notable for the following components:       Result Value    RDW 15.9 (*)     All other components within normal limits   TROPONIN - Abnormal; Notable for the following components:    Troponin, High Sensitivity 46 (*)     All other components within normal limits   TROPONIN - Abnormal; Notable for the following components:    Troponin, High Sensitivity 43 (*)     All other components within normal limits   BRAIN NATRIURETIC PEPTIDE - Abnormal; Notable for the following components:    Pro-BNP 1,881 (*)     All other components within normal limits   PROTIME-INR - Abnormal; Notable for the following components:    Protime 15.8 (*)     INR 1.26 (*)     All other components within normal limits   COMPREHENSIVE METABOLIC PANEL W/ REFLEX TO MG FOR LOW K       His vital signs for the encounter were:    Patient Vitals for the past 24 hrs:   BP Temp Temp src Pulse Resp SpO2 Height Weight   02/05/24 1757

## 2024-02-06 NOTE — PROGRESS NOTES
[facility]     Electrophysiology                                     Progress Note    Admission date:  2024    Reason for follow up visit: Ventricular tachycardia/ICD shock    HPI/CC: Alex Isaac was admitted on 2024 with complaints of ICD shock.  ICD was interrogated which revealed ventricular tachycardia.  Patient states he felt like his heart was beating fast for 1 to 2 minutes.  Then he felt strong shocks to his chest.  Patient states he ran out of his amiodarone and has no refills and has not been taking it. Of note,  patient was in the hospital 2023 with a BiV ICD upgrade.    Rhythm has been VPaced.  Patient had run of 24 beats ventricular tachycardia at approximately 1 AM.  He was asymptomatic according to nursing.    Subjective: Patient denies chest pain, shortness of breath and palpitations.  He has no edema to his lower extremities. Patient family is at bedside.  Discussed he cannot be without his amiodarone.  He runs out to please call the office.  Verbalized understanding.      Vitals:  Blood pressure 113/80, pulse 77, temperature 97.9 °F (36.6 °C), temperature source Oral, resp. rate 18, height 1.651 m (5' 5\"), weight 64.9 kg (143 lb 1.3 oz), SpO2 98 %.  Temp  Av.1 °F (36.7 °C)  Min: 97.9 °F (36.6 °C)  Max: 98.4 °F (36.9 °C)  Pulse  Av.1  Min: 59  Max: 77  BP  Min: 93/64  Max: 117/87  SpO2  Av.6 %  Min: 97 %  Max: 100 %    24 hour I/O    Intake/Output Summary (Last 24 hours) at 2024 1332  Last data filed at 2024 1150  Gross per 24 hour   Intake 50 ml   Output 0 ml   Net 50 ml     Current Facility-Administered Medications   Medication Dose Route Frequency Provider Last Rate Last Admin    apixaban (ELIQUIS) tablet 5 mg  5 mg Oral BID Girma Hester MD   5 mg at 24 0917    furosemide (LASIX) tablet 20 mg  20 mg Oral Daily PRN Girma Hester MD        metoprolol succinate (TOPROL XL) extended release tablet 100 mg  100 mg Oral Daily Mir

## 2024-02-07 LAB
ALBUMIN SERPL-MCNC: 3.6 G/DL (ref 3.4–5)
ALBUMIN/GLOB SERPL: 1.4 {RATIO} (ref 1.1–2.2)
ALP SERPL-CCNC: 48 U/L (ref 40–129)
ALT SERPL-CCNC: 20 U/L (ref 10–40)
ANION GAP SERPL CALCULATED.3IONS-SCNC: 9 MMOL/L (ref 3–16)
AST SERPL-CCNC: 15 U/L (ref 15–37)
BASOPHILS # BLD: 0 K/UL (ref 0–0.2)
BASOPHILS NFR BLD: 0.5 %
BILIRUB SERPL-MCNC: 0.4 MG/DL (ref 0–1)
BUN SERPL-MCNC: 23 MG/DL (ref 7–20)
CALCIUM SERPL-MCNC: 8.7 MG/DL (ref 8.3–10.6)
CHLORIDE SERPL-SCNC: 104 MMOL/L (ref 99–110)
CO2 SERPL-SCNC: 24 MMOL/L (ref 21–32)
CREAT SERPL-MCNC: 0.8 MG/DL (ref 0.9–1.3)
DEPRECATED RDW RBC AUTO: 15.7 % (ref 12.4–15.4)
EOSINOPHIL # BLD: 0.1 K/UL (ref 0–0.6)
EOSINOPHIL NFR BLD: 2 %
GFR SERPLBLD CREATININE-BSD FMLA CKD-EPI: >60 ML/MIN/{1.73_M2}
GLUCOSE SERPL-MCNC: 96 MG/DL (ref 70–99)
HCT VFR BLD AUTO: 38.9 % (ref 40.5–52.5)
HGB BLD-MCNC: 13.1 G/DL (ref 13.5–17.5)
LYMPHOCYTES # BLD: 2 K/UL (ref 1–5.1)
LYMPHOCYTES NFR BLD: 26.4 %
MCH RBC QN AUTO: 29 PG (ref 26–34)
MCHC RBC AUTO-ENTMCNC: 33.7 G/DL (ref 31–36)
MCV RBC AUTO: 86 FL (ref 80–100)
MONOCYTES # BLD: 0.5 K/UL (ref 0–1.3)
MONOCYTES NFR BLD: 6.4 %
NEUTROPHILS # BLD: 4.8 K/UL (ref 1.7–7.7)
NEUTROPHILS NFR BLD: 64.7 %
PLATELET # BLD AUTO: 158 K/UL (ref 135–450)
PMV BLD AUTO: 8.5 FL (ref 5–10.5)
POTASSIUM SERPL-SCNC: 4.2 MMOL/L (ref 3.5–5.1)
PROT SERPL-MCNC: 6.1 G/DL (ref 6.4–8.2)
RBC # BLD AUTO: 4.52 M/UL (ref 4.2–5.9)
SODIUM SERPL-SCNC: 137 MMOL/L (ref 136–145)
WBC # BLD AUTO: 7.5 K/UL (ref 4–11)

## 2024-02-07 PROCEDURE — 80053 COMPREHEN METABOLIC PANEL: CPT

## 2024-02-07 PROCEDURE — 2060000000 HC ICU INTERMEDIATE R&B

## 2024-02-07 PROCEDURE — 6370000000 HC RX 637 (ALT 250 FOR IP): Performed by: INTERNAL MEDICINE

## 2024-02-07 PROCEDURE — 85025 COMPLETE CBC W/AUTO DIFF WBC: CPT

## 2024-02-07 PROCEDURE — 99233 SBSQ HOSP IP/OBS HIGH 50: CPT

## 2024-02-07 PROCEDURE — 6370000000 HC RX 637 (ALT 250 FOR IP)

## 2024-02-07 PROCEDURE — 2580000003 HC RX 258: Performed by: INTERNAL MEDICINE

## 2024-02-07 RX ORDER — MEXILETINE HYDROCHLORIDE 200 MG/1
200 CAPSULE ORAL 2 TIMES DAILY
Status: DISCONTINUED | OUTPATIENT
Start: 2024-02-07 | End: 2024-02-08 | Stop reason: HOSPADM

## 2024-02-07 RX ORDER — HYDROCODONE BITARTRATE AND ACETAMINOPHEN 5; 325 MG/1; MG/1
1 TABLET ORAL EVERY 6 HOURS PRN
Status: DISCONTINUED | OUTPATIENT
Start: 2024-02-07 | End: 2024-02-08 | Stop reason: HOSPADM

## 2024-02-07 RX ADMIN — SACUBITRIL AND VALSARTAN 1.5 TABLET: 24; 26 TABLET, FILM COATED ORAL at 08:26

## 2024-02-07 RX ADMIN — AMIODARONE HYDROCHLORIDE 400 MG: 200 TABLET ORAL at 08:26

## 2024-02-07 RX ADMIN — MEXILETINE HYDROCHLORIDE 200 MG: 200 CAPSULE ORAL at 21:52

## 2024-02-07 RX ADMIN — SACUBITRIL AND VALSARTAN 1.5 TABLET: 24; 26 TABLET, FILM COATED ORAL at 21:52

## 2024-02-07 RX ADMIN — HYDROCODONE BITARTRATE AND ACETAMINOPHEN 1 TABLET: 5; 325 TABLET ORAL at 21:58

## 2024-02-07 RX ADMIN — MEXILETINE HYDROCHLORIDE 200 MG: 200 CAPSULE ORAL at 10:03

## 2024-02-07 RX ADMIN — AMIODARONE HYDROCHLORIDE 400 MG: 200 TABLET ORAL at 21:52

## 2024-02-07 RX ADMIN — APIXABAN 5 MG: 5 TABLET, FILM COATED ORAL at 21:52

## 2024-02-07 RX ADMIN — PANTOPRAZOLE SODIUM 40 MG: 40 TABLET, DELAYED RELEASE ORAL at 05:49

## 2024-02-07 RX ADMIN — Medication 10 ML: at 21:52

## 2024-02-07 RX ADMIN — METOPROLOL SUCCINATE 100 MG: 50 TABLET, EXTENDED RELEASE ORAL at 08:26

## 2024-02-07 RX ADMIN — HYDROCODONE BITARTRATE AND ACETAMINOPHEN 1 TABLET: 5; 325 TABLET ORAL at 12:24

## 2024-02-07 RX ADMIN — APIXABAN 5 MG: 5 TABLET, FILM COATED ORAL at 08:26

## 2024-02-07 RX ADMIN — ACETAMINOPHEN 650 MG: 325 TABLET ORAL at 08:26

## 2024-02-07 RX ADMIN — Medication 10 ML: at 08:27

## 2024-02-07 ASSESSMENT — PAIN DESCRIPTION - DESCRIPTORS
DESCRIPTORS: ACHING;DISCOMFORT
DESCRIPTORS: DISCOMFORT

## 2024-02-07 ASSESSMENT — PAIN DESCRIPTION - ORIENTATION
ORIENTATION: RIGHT;LEFT
ORIENTATION: RIGHT

## 2024-02-07 ASSESSMENT — PAIN SCALES - GENERAL
PAINLEVEL_OUTOF10: 10
PAINLEVEL_OUTOF10: 8
PAINLEVEL_OUTOF10: 4
PAINLEVEL_OUTOF10: 3

## 2024-02-07 ASSESSMENT — PAIN DESCRIPTION - LOCATION
LOCATION: BACK;LEG
LOCATION: BACK;LEG
LOCATION: LEG
LOCATION: BACK;LEG

## 2024-02-07 NOTE — CARE COORDINATION
family members/significant others, and if so, who? Yes  Plans to Return to Present Housing: Yes  Potential Assistance needed at discharge: Other (Comment) (following for needs)            Potential DME:    Patient expects to discharge to: House  Plan for transportation at discharge:      Financial    Payor: [employer] [place] / Plan: [employer] [place] / Product Type: *No Product type* /     Does insurance require precert for SNF: Yes    Potential assistance Purchasing Medications: No  Meds-to-Beds request: Yes      [facility] [id number] - [place], [place] - [address] -  [phone] - F [phone]  [address] [address]  Phone: [phone] Fax: [phone]      Notes:    Factors facilitating achievement of predicted outcomes: Family support, Cooperative, and Pleasant      Additional Case Management Notes: Spoke with patient at bedside.  He lives in an apartment with his niece and her four children.  His is independent at home.   Will continue to follow should any needs arise.       IF APPLICABLE: The Patient and/or patient representative Alex and his family were provided with a choice of provider and agrees with the discharge plan. Freedom of choice list with basic dialogue that supports the patient's individualized plan of care/goals and shares the quality data associated with the providers was provided to:     Patient Representative Name:       The Patient and/or Patient Representative Agree with the Discharge Plan?      Afua Serrano RN  Case Management Department  Ph: [phone]

## 2024-02-07 NOTE — PROGRESS NOTES
Hospital Medicine Progress Note      Date of Admission: 2/5/2024  Hospital Day: 3    Chief Admission Complaint: AICD shock     Subjective: Patient states he is feeling better.  No chest pain.  Patient denies any additional AICD discharges.    Presenting Admission History:       Alex Isaac was admitted on 2/5/2024 with complaints of ICD shock.  ICD was interrogated which revealed ventricular tachycardia.  Patient states he felt like his heart was beating fast for 1 to 2 minutes.  Then he felt strong shocks to his chest.  Patient states he ran out of his amiodarone and has no refills and has not been taking it. Of note,  patient was in the hospital November 2023 with a BiV ICD upgrade.    Assessment/Plan:      Current Principal Problem:  Inappropriate shocks from ICD (implantable cardioverter-defibrillator), initial encounter    1.  Ventricular tachycardia.  Patient with AICD.  Continue antiarrhythmics per EP.  Patient on amiodarone, Toprol, Eliquis and Entresto.  2.  Paroxysmal atrial fibrillation.  EP following.  Continue Eliquis, beta-blocker and amiodarone.  3.  Nonischemic cardiomyopathy.  EF was less than 20%.  Continue to monitor closely.  4.  Muscular dystrophy.  Patient dependent on wheelchair at home.    Physical Exam Performed:      General appearance:  No apparent distress  Respiratory:  Normal respiratory effort.   Cardiovascular:  Regular rate and rhythm.  Abdomen:  Soft, non-tender, non-distended.  Musculoskeletal:  No edema  Neurologic:  Non-focal  Psychiatric:  Alert and oriented    /74   Pulse 60   Temp 98.6 °F (37 °C) (Oral)   Resp 18   Ht 1.651 m (5' 5\")   Wt 64.9 kg (143 lb 1.3 oz)   SpO2 98%   BMI 23.81 kg/m²     Diet: ADULT DIET; Regular  DVT Prophylaxis: []PPx LMWH  []SQ Heparin  []IPC/SCDs  [x]Eliquis  []Xarelto  []Coumadin  []Other -      Code status: Full Code  PT/OT Eval Status:   [x]NOT yet ordered  []Ordered and Pending   []Seen with Recommendations for:  []Home

## 2024-02-07 NOTE — PLAN OF CARE
Problem: Discharge Planning  Goal: Discharge to home or other facility with appropriate resources  2/7/2024 0052 by Candace Al RN  Outcome: Progressing     Problem: Skin/Tissue Integrity  Goal: Absence of new skin breakdown  Description: 1.  Monitor for areas of redness and/or skin breakdown  2.  Assess vascular access sites hourly  3.  Every 4-6 hours minimum:  Change oxygen saturation probe site  4.  Every 4-6 hours:  If on nasal continuous positive airway pressure, respiratory therapy assess nares and determine need for appliance change or resting period.  2/7/2024 0052 by Candace Al, RN  Outcome: Progressing     Problem: Safety - Adult  Goal: Free from fall injury  2/7/2024 0052 by Candace Al RN  Outcome: Progressing  Note: Pt will remain free from falls throughout hospital stay. Fall precautions in place, bed alarm on, bed in lowest position with wheels locked and side rails 2/4 up. Room door open and hourly rounding completed. Will continue to monitor throughout shift.      Problem: Pain  Goal: Verbalizes/displays adequate comfort level or baseline comfort level  2/7/2024 0052 by Candace Al, RN  Outcome: Progressing  Note: Pt will be satisfied with pain control. Pt uses numeric pain rating scale with reassessments after pain med administration. Will continue to monitor progression throughout shift.

## 2024-02-07 NOTE — PROGRESS NOTES
V2.0    [facility] Progress Note      Name:  Alex Isaac /Age/Sex: 1985  (38 y.o. male)   MRN & CSN:  [id number] & [id number] Encounter Date/Time: 2024 7:46 PM EST   Location:   PCP: Amadou Rodriguez MD     Attending:Otto Dumas MD       Hospital Day: 2    Assessment and Recommendations   Alex Isaac is a 38 y.o. male with pmh of NICM, atrial fibrillation, ventricular tachycardia, chronic systolic heart failure, muscular dystrophy who presents with Inappropriate shocks from ICD (implantable cardioverter-defibrillator), initial encounter      Plan:   Ventricular tachycardia: S/p ICD shock.  BiV ICD Medtronic upgrade 2023.  EP following with recommendations to continue amiodarone, Toprol-XL, Eliquis, Entresto.  Paroxysmal atrial fibrillation: Per history, EKG paced on admit.  EP following.  Continue Eliquis, beta-blocker, amiodarone.  Nonischemic cardiomyopathy: Per history, echo in 2023 showed EF of less than 20%.  Continue Entresto, beta-blocker  History of muscular dystrophy: Utilizes wheelchair.  Tobacco use: Counseled on cessation.      Diet ADULT DIET; Regular   DVT Prophylaxis [] Lovenox, []  Heparin, [] SCDs, [] Ambulation,  [x] Eliquis, [] Xarelto  [] Coumadin   Code Status Full Code   Disposition From: Home  Expected Disposition: Home  Estimated Date of Discharge:  if okay with EP  Patient requires continued admission due to monitoring rhythm and further EP recs as needed   Surrogate Decision Maker/ WILLIAM Orta at bedside      Personally reviewed Lab Studies and Imaging           Subjective:     Chief Complaint: ICD shock    Alex Isaac is a 38 y.o. male who presents with ICD shock that has not recurred.  Paced rhythm.  No chest pain, shortness breath, palpitations.  Labs unremarkable.  Restarted on amiodarone.      Review of Systems:      Pertinent positives and negatives discussed in HPI    Objective:     Intake/Output Summary (Last 24 hours) at 2024

## 2024-02-07 NOTE — PROGRESS NOTES
Pt had multiple runs of vtach associated with acid reflux. No chest pain no lightheadedness. VSS Paged to provider.

## 2024-02-07 NOTE — PROGRESS NOTES
[facility]     Electrophysiology                                     Progress Note    Admission date:  2024    Reason for follow up visit: Ventricular tachycardia/ICD shock    HPI/CC: Alex Isaac was admitted on 2024 with complaints of ICD shock.  ICD was interrogated which revealed ventricular tachycardia.  Patient states he felt like his heart was beating fast for 1 to 2 minutes.  Then he felt strong shocks to his chest.  Patient states he ran out of his amiodarone and has no refills and has not been taking it. Of note,  patient was in the hospital 2023 with a BiV ICD upgrade.    Rhythm has been VPaced. Patient had many runs of VT over ~ 15 beats with each episode. Patient said he had ~3 episodes with symptoms prior to 10 pm last evening. Chest discomfort and abdominal discomfort described as reflux. Overnight he said he was sleeping during the episodes.     Subjective: Patient currently denies chest pain, shortness of breath and palpitations.  He has no edema to his lower extremities.    Vitals:  Blood pressure 107/75, pulse 60, temperature 97.5 °F (36.4 °C), temperature source Oral, resp. rate 18, height 1.651 m (5' 5\"), weight 64.9 kg (143 lb 1.3 oz), SpO2 100 %.  Temp  Av °F (36.7 °C)  Min: 97.5 °F (36.4 °C)  Max: 98.6 °F (37 °C)  Pulse  Av  Min: 60  Max: 77  BP  Min: 101/75  Max: 128/87  SpO2  Av.3 %  Min: 98 %  Max: 100 %    24 hour I/O    Intake/Output Summary (Last 24 hours) at 2024 0811  Last data filed at 2024 0215  Gross per 24 hour   Intake 530 ml   Output 200 ml   Net 330 ml       Current Facility-Administered Medications   Medication Dose Route Frequency Provider Last Rate Last Admin    calcium carbonate (TUMS) chewable tablet 500 mg  500 mg Oral TID PRN Jelani Nassar APRN - CNP   500 mg at 24    apixaban (ELIQUIS) tablet 5 mg  5 mg Oral BID Girma Hester MD   5 mg at 24    furosemide (LASIX) tablet 20 mg  20 mg Oral

## 2024-02-08 VITALS
DIASTOLIC BLOOD PRESSURE: 94 MMHG | HEART RATE: 59 BPM | TEMPERATURE: 98.2 F | RESPIRATION RATE: 18 BRPM | HEIGHT: 65 IN | OXYGEN SATURATION: 100 % | BODY MASS INDEX: 23.84 KG/M2 | WEIGHT: 143.08 LBS | SYSTOLIC BLOOD PRESSURE: 111 MMHG

## 2024-02-08 LAB
ALBUMIN SERPL-MCNC: 3.8 G/DL (ref 3.4–5)
ALBUMIN/GLOB SERPL: 1.5 {RATIO} (ref 1.1–2.2)
ALP SERPL-CCNC: 56 U/L (ref 40–129)
ALT SERPL-CCNC: 33 U/L (ref 10–40)
ANION GAP SERPL CALCULATED.3IONS-SCNC: 9 MMOL/L (ref 3–16)
AST SERPL-CCNC: 22 U/L (ref 15–37)
BASOPHILS # BLD: 0 K/UL (ref 0–0.2)
BASOPHILS NFR BLD: 0.5 %
BILIRUB SERPL-MCNC: 0.4 MG/DL (ref 0–1)
BUN SERPL-MCNC: 23 MG/DL (ref 7–20)
CALCIUM SERPL-MCNC: 8.5 MG/DL (ref 8.3–10.6)
CHLORIDE SERPL-SCNC: 104 MMOL/L (ref 99–110)
CO2 SERPL-SCNC: 23 MMOL/L (ref 21–32)
CREAT SERPL-MCNC: 0.7 MG/DL (ref 0.9–1.3)
DEPRECATED RDW RBC AUTO: 15.4 % (ref 12.4–15.4)
EOSINOPHIL # BLD: 0.2 K/UL (ref 0–0.6)
EOSINOPHIL NFR BLD: 2.1 %
GFR SERPLBLD CREATININE-BSD FMLA CKD-EPI: >60 ML/MIN/{1.73_M2}
GLUCOSE SERPL-MCNC: 93 MG/DL (ref 70–99)
HCT VFR BLD AUTO: 40.5 % (ref 40.5–52.5)
HGB BLD-MCNC: 13.6 G/DL (ref 13.5–17.5)
LYMPHOCYTES # BLD: 2.2 K/UL (ref 1–5.1)
LYMPHOCYTES NFR BLD: 29.1 %
MCH RBC QN AUTO: 28.8 PG (ref 26–34)
MCHC RBC AUTO-ENTMCNC: 33.7 G/DL (ref 31–36)
MCV RBC AUTO: 85.4 FL (ref 80–100)
MONOCYTES # BLD: 0.5 K/UL (ref 0–1.3)
MONOCYTES NFR BLD: 6.1 %
NEUTROPHILS # BLD: 4.7 K/UL (ref 1.7–7.7)
NEUTROPHILS NFR BLD: 62.2 %
PLATELET # BLD AUTO: 166 K/UL (ref 135–450)
PMV BLD AUTO: 8.5 FL (ref 5–10.5)
POTASSIUM SERPL-SCNC: 4.5 MMOL/L (ref 3.5–5.1)
PROT SERPL-MCNC: 6.3 G/DL (ref 6.4–8.2)
RBC # BLD AUTO: 4.74 M/UL (ref 4.2–5.9)
SODIUM SERPL-SCNC: 136 MMOL/L (ref 136–145)
WBC # BLD AUTO: 7.6 K/UL (ref 4–11)

## 2024-02-08 PROCEDURE — 6370000000 HC RX 637 (ALT 250 FOR IP): Performed by: INTERNAL MEDICINE

## 2024-02-08 PROCEDURE — 2580000003 HC RX 258: Performed by: INTERNAL MEDICINE

## 2024-02-08 PROCEDURE — 85025 COMPLETE CBC W/AUTO DIFF WBC: CPT

## 2024-02-08 PROCEDURE — 6370000000 HC RX 637 (ALT 250 FOR IP)

## 2024-02-08 PROCEDURE — 99232 SBSQ HOSP IP/OBS MODERATE 35: CPT

## 2024-02-08 PROCEDURE — 80053 COMPREHEN METABOLIC PANEL: CPT

## 2024-02-08 RX ORDER — AMIODARONE HYDROCHLORIDE 200 MG/1
200 TABLET ORAL DAILY
Qty: 90 TABLET | Refills: 0 | Status: SHIPPED | OUTPATIENT
Start: 2024-02-15 | End: 2024-05-15

## 2024-02-08 RX ORDER — AMIODARONE HYDROCHLORIDE 200 MG/1
200 TABLET ORAL DAILY
Status: DISCONTINUED | OUTPATIENT
Start: 2024-02-15 | End: 2024-02-08 | Stop reason: HOSPADM

## 2024-02-08 RX ORDER — AMIODARONE HYDROCHLORIDE 200 MG/1
200 TABLET ORAL 2 TIMES DAILY
Status: DISCONTINUED | OUTPATIENT
Start: 2024-02-08 | End: 2024-02-08 | Stop reason: HOSPADM

## 2024-02-08 RX ORDER — AMIODARONE HYDROCHLORIDE 200 MG/1
200 TABLET ORAL 2 TIMES DAILY
Qty: 14 TABLET | Refills: 0 | Status: SHIPPED | OUTPATIENT
Start: 2024-02-08 | End: 2024-02-15

## 2024-02-08 RX ORDER — MEXILETINE HYDROCHLORIDE 200 MG/1
200 CAPSULE ORAL 2 TIMES DAILY
Qty: 90 CAPSULE | Refills: 3 | Status: SHIPPED | OUTPATIENT
Start: 2024-02-08

## 2024-02-08 RX ADMIN — HYDROCODONE BITARTRATE AND ACETAMINOPHEN 1 TABLET: 5; 325 TABLET ORAL at 11:54

## 2024-02-08 RX ADMIN — ACETAMINOPHEN 650 MG: 325 TABLET ORAL at 09:41

## 2024-02-08 RX ADMIN — APIXABAN 5 MG: 5 TABLET, FILM COATED ORAL at 09:41

## 2024-02-08 RX ADMIN — MEXILETINE HYDROCHLORIDE 200 MG: 200 CAPSULE ORAL at 09:42

## 2024-02-08 RX ADMIN — Medication 10 ML: at 09:42

## 2024-02-08 RX ADMIN — PANTOPRAZOLE SODIUM 40 MG: 40 TABLET, DELAYED RELEASE ORAL at 05:24

## 2024-02-08 RX ADMIN — METOPROLOL SUCCINATE 100 MG: 50 TABLET, EXTENDED RELEASE ORAL at 09:41

## 2024-02-08 RX ADMIN — SACUBITRIL AND VALSARTAN 1.5 TABLET: 24; 26 TABLET, FILM COATED ORAL at 09:41

## 2024-02-08 RX ADMIN — AMIODARONE HYDROCHLORIDE 400 MG: 200 TABLET ORAL at 09:41

## 2024-02-08 ASSESSMENT — PAIN SCALES - GENERAL: PAINLEVEL_OUTOF10: 7

## 2024-02-08 NOTE — PLAN OF CARE
Problem: Discharge Planning  Goal: Discharge to home or other facility with appropriate resources  Outcome: Progressing  Flowsheets (Taken 2/7/2024 2249)  Discharge to home or other facility with appropriate resources: Identify barriers to discharge with patient and caregiver     Problem: Pain  Goal: Verbalizes/displays adequate comfort level or baseline comfort level  Outcome: Progressing  Note: Pt will be satisfied with pain control. Pt uses numeric pain rating scale with reassessments after pain med administration. Will continue to monitor progression throughout shift.

## 2024-02-08 NOTE — PROGRESS NOTES
[facility]     Electrophysiology                                     Progress Note    Admission date:  2024    Reason for follow up visit: Ventricular tachycardia/ICD shock    HPI/CC: Alex Isaac was admitted on 2024 with complaints of ICD shock.  ICD was interrogated which revealed ventricular tachycardia.  Patient states he felt like his heart was beating fast for 1 to 2 minutes.  Then he felt strong shocks to his chest.  Patient states he ran out of his amiodarone and has no refills and has not been taking it. Of note,  patient was in the hospital 2023 with a BiV ICD upgrade.    Rhythm has been VPaced.  No runs of VT since yesterday at approximately 10 AM.     Subjective: Patient currently denies chest pain, shortness of breath and palpitations.  He has no edema to his lower extremities.  Patient states he feels good and is hoping to go home today    Vitals:  Blood pressure 116/81, pulse 56, temperature 97.9 °F (36.6 °C), temperature source Oral, resp. rate 18, height 1.651 m (5' 5\"), weight 64.9 kg (143 lb 1.3 oz), SpO2 100 %.  Temp  Av °F (36.7 °C)  Min: 97.5 °F (36.4 °C)  Max: 98.6 °F (37 °C)  Pulse  Av.8  Min: 55  Max: 70  BP  Min: 102/69  Max: 131/80  SpO2  Av.3 %  Min: 98 %  Max: 100 %    24 hour I/O    Intake/Output Summary (Last 24 hours) at 2024 1115  Last data filed at 2024 0948  Gross per 24 hour   Intake 580 ml   Output 150 ml   Net 430 ml       Current Facility-Administered Medications   Medication Dose Route Frequency Provider Last Rate Last Admin    mexiletine (MEXITIL) capsule 200 mg  200 mg Oral BID Elisa Martinez APRN - CNP   200 mg at 24 0942    HYDROcodone-acetaminophen (NORCO) 5-325 MG per tablet 1 tablet  1 tablet Oral Q6H PRN Gorge Malone MD   1 tablet at 24 2158    calcium carbonate (TUMS) chewable tablet 500 mg  500 mg Oral TID PRN Jelani Nassar APRN - CNP   500 mg at 24 2205    apixaban (ELIQUIS)

## 2024-02-08 NOTE — DISCHARGE INSTR - COC
Continuity of Care Form    Patient Name: Alex Isaac   :  1985  MRN:  [id number]    Admit date:  2024  Discharge date:  ***    Code Status Order: Full Code   Advance Directives:     Admitting Physician:  Girma Hester MD  PCP: Amadou Rodriguez MD    Discharging Nurse: ***  Discharging Hospital Unit/Room#: 0219/0219-01  Discharging Unit Phone Number: ***    Emergency Contact:   Extended Emergency Contact Information  Primary Emergency Contact: SUMI KULKARNI, [facility]  Home Phone: [phone]  Relation: Niece/Nephew  Secondary Emergency Contact: anurag isaac  Mobile Phone: [phone]  Relation: Child    Past Surgical History:  Past Surgical History:   Procedure Laterality Date    PACEMAKER INSERTION         Immunization History:     There is no immunization history on file for this patient.    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    Ventricular tachycardia ([facility]) I47.20    Biatrial enlargement I51.7    PFO (patent foramen ovale) Q21.12    Pulmonary HTN ([facility]) I27.20    Suspected sleep apnea R29.818    Pulmonary venous congestion R09.89    Current smoker F17.200    Marijuana use F12.90    Normal anion gap metabolic acidosis E87.20    PAF (paroxysmal atrial fibrillation) ([facility]) I48.0    Presence of cardiac resynchronization therapy defibrillator (CRT-D)-medtronic -NOT MRI COMPATIBLE Z95.810    Hyperlipidemia E78.5    GERD (gastroesophageal reflux disease) K21.9    Inappropriate shocks from ICD (implantable cardioverter-defibrillator), initial encounter T82.198A       Isolation/Infection:   Isolation            No Isolation          Patient Infection

## 2024-02-08 NOTE — PLAN OF CARE
Problem: Discharge Planning  Goal: Discharge to home or other facility with appropriate resources  2/8/2024 1214 by Lou Pizarro RN  Outcome: Progressing  Note:   CHF Care Plan      Patient's EF (Ejection Fraction) is less than 40%    Heart Failure Medications:  Diuretics:: None    (One of the following REQUIRED for EF </= 40%/SYSTOLIC FAILURE but MAY be used in EF% >40%/DIASTOLIC FAILURE)        ACE:: None        ARB:: None         ARNI:: Sacubitril/Valsartan-Entresto    (Beta Blockers)  NON- Evidenced Based Beta Blocker (for EF% >40%/DIASTOLIC FAILURE): None    Evidenced Based Beta Blocker::(REQUIRED for EF% <40%/SYSTOLIC FAILURE) Metoprolol SUCCinate- Toprol XL  ...................................................................................................................................................          Patient's weights and intake/output reviewed    Daily Weight log at bedside, patient/family participation in use of log: \"yes    Patient's current weight today shows a difference of 0 lbs less than last documented weight.      Intake/Output Summary (Last 24 hours) at 2/8/2024 1214  Last data filed at 2/8/2024 1159  Gross per 24 hour   Intake 800 ml   Output 150 ml   Net 650 ml       Education Booklet Provided: yes    Comorbidities Reviewed Yes    Patient has a past medical history of Muscular dystrophy (HCC).     >>For CHF and Comorbidity documentation on Education Time and Topics, please see Education Tab      Pt resting in bed at this time on room air. Pt denies shortness of breath. Pt with nonpitting lower extremity edema.     Patient and/or Family's stated Goal of Care this Admission: increase activity tolerance, better understand heart failure and disease management, be more comfortable, and reduce lower extremity edema prior to discharge        :      Problem: Safety - Adult  Goal: Free from fall injury  Outcome: Progressing  Note: Pt will remain free from falls throughout hospital stay.

## 2024-02-08 NOTE — DISCHARGE SUMMARY
Hospital Medicine Discharge Summary    Patient: Alex Isaac   : 1985     Admit Date: 2024   Discharge Date:   2024    Disposition:  [x]Home   []HHC  []SNF  []ECF  []Acute Rehab  []LTAC  []Hospice  Code status:  [x]Full  []DNR/CCA  []Limited (DNR/CCA with Do Not Intubate)  []DNRCC  Condition at Discharge: Stable  Primary Care Provider: Amadou Rodriguez MD    Admitting Provider: Girma Hester MD  Discharge Provider: RANDAL RAMON MD     Discharge Diagnoses:      Active Hospital Problems    Diagnosis     Inappropriate shocks from ICD (implantable cardioverter-defibrillator), initial encounter [T82.198A]        Presenting Admission History:      Alex Isaac was admitted on 2024 with complaints of ICD shock.  ICD was interrogated which revealed ventricular tachycardia.  Patient states he felt like his heart was beating fast for 1 to 2 minutes.  Then he felt strong shocks to his chest.  Patient states he ran out of his amiodarone and has no refills and has not been taking it. Of note,  patient was in the hospital 2023 with a BiV ICD upgrade.      Assessment/Plan:      1.  Ventricular tachycardia.  Patient with AICD.  Continue antiarrhythmics per EP.  Patient to continue amiodarone, mexiletine, Toprol  2.  Paroxysmal atrial fibrillation.  EP following.  Continue Eliquis, beta-blocker and amiodarone.  3.  Nonischemic cardiomyopathy.  EF was less than 20%.  Continue to monitor closely.  Continue Entresto, beta-blocker.  4.  Muscular dystrophy.  Patient dependent on wheelchair at home.       Physical Exam Performed:      BP (!) 111/94   Pulse 59   Temp 98.2 °F (36.8 °C) (Oral)   Resp 18   Ht 1.651 m (5' 5\")   Wt 64.9 kg (143 lb 1.3 oz)   SpO2 100%   BMI 23.81 kg/m²     General appearance:  No apparent distress, appears stated age and cooperative.  Respiratory:  Normal respiratory effort.   Cardiovascular:  Regular rate and rhythm.  Abdomen:  Soft, non-tender,

## 2024-02-08 NOTE — PROGRESS NOTES
Pt d/c'd home.  Removed peripheral IV and stopped bleeding.  Catheter intact. Pt tolerated well. No redness noted at site.  Notified [facility] and removed tele box. Reviewed d/c instructions, home meds, and  f/u information utilizing teach-back method.  Picked up meds from outpatient pharmacy. Patient verbalized understanding.

## 2024-02-14 ENCOUNTER — TELEPHONE (OUTPATIENT)
Dept: CARDIOLOGY CLINIC | Age: 39
End: 2024-02-14

## 2024-02-14 NOTE — TELEPHONE ENCOUNTER
Pt called to report since being in the hospital he has felt weak and occasional dizziness. Pt unable to check BP at this time. He says he is taking all medication as prescribed. Pt asking if his new medication could be causing his symptoms. He was started on Mexitil in the hospital. He denies chest pain, shortness of breath.     Pt saw KXA in the hospital 2/2024  Next OV KXA 3/4/2024

## 2024-02-14 NOTE — TELEPHONE ENCOUNTER
Last seen pt in hospital 02/05/2024:    Plan:   1. Continue amiodarone 200 mg twice daily--for 7 days then reduce to 200 mg daily  2. Continue to monitor for drug toxicity   3.  Continue mexiletine 200 mg twice daily  4.  Continue Toprol- mg daily  5.  Continue Eliquis 5 mg twice daily for stroke risk reduction  6.  Continue Entresto 24-26 mg 1 and half tablet 2 times a day  7.  Okay for discharge later this afternoon if patient has no runs of VT  8.  Will need follow-up with Dr. Mckeon in 4 weeks  9. Discussed plan with patient and nursing and Dr Mckeon

## 2024-02-14 NOTE — TELEPHONE ENCOUNTER
CARELINK shows decreased thoracic impedance consistent with increased fluid index.  He is to be taking the furosemide (Lasix) 20 mg on MWF - please ask him to take an additional dose tomorrow.   Thank you, Reyna

## 2024-02-15 NOTE — TELEPHONE ENCOUNTER
Spoke w/ patient. Today is the first day he will take amiodarone once daily.   See MURJ to review transmission. No arrhythmias noted.  TI decreased. ABDIFATAH cc'd.

## 2024-02-15 NOTE — TELEPHONE ENCOUNTER
Please see my telephone encounter from yesterday.   Thank you, Reyna BROWN shows decreased thoracic impedance consistent with increased fluid index.  He is to be taking the furosemide (Lasix) 20 mg on MWF - please ask him to take an additional dose tomorrow.   Thank you, Reyna

## 2024-02-15 NOTE — TELEPHONE ENCOUNTER
Elisa Martinez, APRN - CNP  [facility]; Marta Mckeon MD3 hours ago (11:00 AM)     KK  I did not see any episodes of VT on his remote device check.  If he has a way to check his blood pressure at home that would be great.  If not happy for him to come in and have blood pressure check in the office.  Thank you  Elisa         Being addressed in separate encounter. Closing this one.

## 2024-02-23 NOTE — TELEPHONE ENCOUNTER
Medication refill:    Entresto 24-26mg Take 1.5 tablets by mouth 2 times daily, 270 tabs    Ph[facility]:   [id number] - [place], [place] - [address] -  [phone] - F [phone] [[id number]]     Pt has been told by pharmacy that insurance needs pre auth.

## 2024-02-28 ENCOUNTER — TELEPHONE (OUTPATIENT)
Dept: CARDIOLOGY CLINIC | Age: 39
End: 2024-02-28

## 2024-03-01 NOTE — PROGRESS NOTES
Stability Vital Sign     Unable to Pay for Housing in the Last Year: No     Number of Places Lived in the Last Year: 3     Unstable Housing in the Last Year: No     Family History   Problem Relation Age of Onset    Diabetes Mother      Objective:     /78   Pulse 60   Ht 1.651 m (5' 5\")   Wt 67.6 kg (149 lb) Comment: pt report  SpO2 100%   BMI 24.79 kg/m²     Physical Exam  Constitutional:       Appearance: Normal appearance.   HENT:      Head: Normocephalic and atraumatic.      Nose: Nose normal. No rhinorrhea.   Eyes:      General: No scleral icterus.     Conjunctiva/sclera: Conjunctivae normal.   Cardiovascular:      Rate and Rhythm: Normal rate and regular rhythm.   Pulmonary:      Effort: Pulmonary effort is normal.      Breath sounds: Normal breath sounds.   Abdominal:      General: There is no distension.   Musculoskeletal:         General: Normal range of motion.      Cervical back: Normal range of motion and neck supple.      Right lower leg: No edema.      Left lower leg: No edema.   Skin:     General: Skin is warm and dry.   Neurological:      General: No focal deficit present.      Mental Status: He is alert and oriented to person, place, and time.   Psychiatric:         Mood and Affect: Mood normal.         Behavior: Behavior normal.       Echocardiogram  (Date: 11/25/2023)  Summary   The left ventricular systolic function is severely reduced with an ejection   fraction of <20%.   The anterior wall, septal walls, and lateral walls are severely hypokinetic   to akinetic. Inferior walls are hypokinetic.   Left ventricular cavity size is moderately dilated with normal left   ventricular wall thickness.   Indeterminate diastolic function.   Normal right ventricular size.   Right ventricular systolic function is mildly reduced   Moderate Bi-atrial enlargement.   Mild mitral regurgitation.   There is some degree of posterior leaflet restriction resulting in an   eccentric posteriorly directed jet.

## 2024-03-04 ENCOUNTER — OFFICE VISIT (OUTPATIENT)
Dept: CARDIOLOGY CLINIC | Age: 39
End: 2024-03-04
Payer: MEDICAID

## 2024-03-04 ENCOUNTER — TELEPHONE (OUTPATIENT)
Dept: CARDIOLOGY CLINIC | Age: 39
End: 2024-03-04

## 2024-03-04 ENCOUNTER — NURSE ONLY (OUTPATIENT)
Dept: CARDIOLOGY CLINIC | Age: 39
End: 2024-03-04
Payer: MEDICAID

## 2024-03-04 VITALS
HEIGHT: 65 IN | BODY MASS INDEX: 24.83 KG/M2 | OXYGEN SATURATION: 100 % | HEART RATE: 60 BPM | SYSTOLIC BLOOD PRESSURE: 112 MMHG | DIASTOLIC BLOOD PRESSURE: 78 MMHG | WEIGHT: 149 LBS

## 2024-03-04 DIAGNOSIS — Z95.810 CARDIAC RESYNCHRONIZATION THERAPY DEFIBRILLATOR (CRT-D) IN PLACE: Primary | ICD-10-CM

## 2024-03-04 DIAGNOSIS — I44.2 CHB (COMPLETE HEART BLOCK) (HCC): ICD-10-CM

## 2024-03-04 DIAGNOSIS — I48.0 PAF (PAROXYSMAL ATRIAL FIBRILLATION) (HCC): ICD-10-CM

## 2024-03-04 DIAGNOSIS — I42.8 NICM (NONISCHEMIC CARDIOMYOPATHY) (HCC): ICD-10-CM

## 2024-03-04 DIAGNOSIS — I47.20 VENTRICULAR TACHYCARDIA (HCC): Primary | ICD-10-CM

## 2024-03-04 DIAGNOSIS — Z95.0 PRESENCE OF PERMANENT CARDIAC PACEMAKER: ICD-10-CM

## 2024-03-04 DIAGNOSIS — Z95.810 PRESENCE OF CARDIAC RESYNCHRONIZATION THERAPY DEFIBRILLATOR (CRT-D): ICD-10-CM

## 2024-03-04 PROCEDURE — 99215 OFFICE O/P EST HI 40 MIN: CPT | Performed by: INTERNAL MEDICINE

## 2024-03-04 PROCEDURE — 93284 PRGRMG EVAL IMPLANTABLE DFB: CPT | Performed by: INTERNAL MEDICINE

## 2024-03-04 ASSESSMENT — ENCOUNTER SYMPTOMS
HEMATEMESIS: 0
LEFT EYE: 0
WHEEZING: 0
HEMATOCHEZIA: 0
STRIDOR: 0
RIGHT EYE: 0
SHORTNESS OF BREATH: 0

## 2024-03-04 NOTE — TELEPHONE ENCOUNTER
Sherrie gomes [facility] states pt needs 90 tablets for 30 days bc pt is taking 1.5 tablets BID. Sherrie states a prior auth would need to be done for this quantity of medication. Please advise.

## 2024-03-04 NOTE — TELEPHONE ENCOUNTER
[facility] stating the approval status still remains pending. Questioning if there may be an issue with quantity or dosage authorized.     Pharmacy states putting back new request may resolve error    Please call [phone]  with questions

## 2024-03-04 NOTE — TELEPHONE ENCOUNTER
[facility]  EP Procedure Sheet    03/04/2024  Alex Isaac  1985  [id number]  EP Procedures  [] Pacemaker implant (single/dual) [] EP Study   [] ICD implant (single/dual) [] Atrial flutter ablation (JOSE Y/N)   [] Biv implant ICD [] Tilt Table   [] Biv implant PPM [] Atrial fibrillation ablation (JOSE Yes)   [] Generator Change (PPM/ICD/BiV) [] SVT ablation   [] Lead revision (RV/LA/RA) (<1 month) [] PVC ablation     [] Lead extraction +/- upgrade (BiV/PPM/ICD) [] VT Ischemic/ non-ischemic   [] Loop implant/ removal [] VT RVOT   [] Cardioversion [x] VT Left sided scar ablation   [] JOSE [] AVN ablation   Equipment  [] Medtronic  [x] CELIA Mapping System    [] Precision [x] Ensite X   [] St. Momo/Celestin [] Carto Mapping System   [] [employer] [] CryoAblation   [] Biotronik [] Laser Lead Extraction   EP Procedures Scheduling Request  # hours Requested  []1 []2 []2-4 [x] 5 Scheduled  Date:   Specific Day  Completed    Anesthesia [x]yes []no F/u Date:   CT surgery backup []yes [x]no Location []FF[]AND   Overnight stay   []KW[]Abimael Melissa MD []RMM []MXA []MW  []UL []CMV  []WW [] RWH   []KA [] JMB [] AJK  First vs repeat   []1st [] 2nd [] 3rd   Pre-Procedure Labs / Imaging  [] PT/INR [] Type & cross   [x] CBC [] Units PRBC   [x] BMP/Mg [] Units FFP   [] Venogram [] Cardiac CTA for Pulmonary vein mapping     RN INITIALS: RNJC    Patient Instructions  Dx:VT  ICD-10 code: I47.20  Medication Instructions: Hold []Xarelto [x]Eliquis []Coumadin []pradaxa for 24 hours prior   Do not eat or drink after midnight the night prior to procedure [x] Yes [] No    - Do not hold Mexiletine or Amiodarone   - Hold Lasix the morning of the procedure   - Hold Eliquis for 24 hours prior to the procedure

## 2024-03-04 NOTE — PATIENT INSTRUCTIONS
Plan:     Discussed the risks and benefits of a ventricular tachycardia catheter ablation (literature provided).   - Do not hold Mexiletine or Amiodarone   - Hold Lasix the morning of the procedure   - Hold Eliquis 24 hours prior to the procedure  Continue taking current cardiac medications as prescribed.   Follow up with me after the procedure.

## 2024-03-04 NOTE — TELEPHONE ENCOUNTER
Patient had OV w/ KXA today w/ device check. Interrogation shows decreased TI - trending downward. Please see Murj for report and advise.

## 2024-03-04 NOTE — TELEPHONE ENCOUNTER
Prior Auth Team: are you able to redo the prior auth based on the new quantity of medication?    90 tablets for 30 days bc pt is taking 1.5 tablets Entresto 4/26mg BID

## 2024-03-04 NOTE — TELEPHONE ENCOUNTER
This was done 90 for 30 days on the prior authorization.  If I need to do anything else please send back to the pool. thanks

## 2024-03-05 NOTE — TELEPHONE ENCOUNTER
Spoke with pt and relayed [employer] message. Pt agreed to take lasix 5 times a week rather then PRN. Medication review updated.     Pt also asked about Entresto PA, which is being handled in Prior Auth encounter 2/28/2024

## 2024-03-05 NOTE — TELEPHONE ENCOUNTER
Called [facility], spoke with Marv when i submitted for 90 for 30 they didn't change it on the PA, so that is fixed and it that doesn't work he said to have the pharmacy use their DUR code

## 2024-03-05 NOTE — TELEPHONE ENCOUNTER
[facility] whose staff tried to run entresto through approval. Not being approved saying \"exceeds PA quantity limit\"

## 2024-03-05 NOTE — TELEPHONE ENCOUNTER
CARELINK shows decreased thoracic impedance consistent with increased fluid index.    Let's have him increase the furosemide (Lasix) 20 mg to 5 days per week.     Thank you, Reyna

## 2024-03-05 NOTE — TELEPHONE ENCOUNTER
Spoke with [facility] to recheck PA for Entresto. Pharmacy staff re-ran the medication and it went through for a paid claim.     Unable to leave VM for patient to update him on the Entresto approval.

## 2024-03-05 NOTE — TELEPHONE ENCOUNTER
Procedure:  VT Ablation  Doctor:  Dr. Mckeon  Date:  4/16/24  Time:  8am  Arrival:  6:30am  Reps:  Ensite X  Anesthesia:  Yes      Spoke with patient. Please have patient arrive to the main entrance of [facility] ([address] [place] [id number]) and check in with the registration desk.  They will be directed to the Cath Lab.   Remind patient to be NPO after midnight (8 hours prior). Do not apply lotions/creams on skin the day of procedure.      Instructions emailed per request.

## 2024-03-28 ENCOUNTER — OFFICE VISIT (OUTPATIENT)
Dept: CARDIOLOGY CLINIC | Age: 39
End: 2024-03-28
Payer: MEDICAID

## 2024-03-28 VITALS
HEIGHT: 65 IN | DIASTOLIC BLOOD PRESSURE: 72 MMHG | BODY MASS INDEX: 24.79 KG/M2 | HEART RATE: 60 BPM | OXYGEN SATURATION: 98 % | SYSTOLIC BLOOD PRESSURE: 110 MMHG

## 2024-03-28 DIAGNOSIS — Z95.810 PRESENCE OF CARDIAC RESYNCHRONIZATION THERAPY DEFIBRILLATOR (CRT-D): ICD-10-CM

## 2024-03-28 DIAGNOSIS — I50.22 CHRONIC SYSTOLIC CONGESTIVE HEART FAILURE (HCC): ICD-10-CM

## 2024-03-28 DIAGNOSIS — I42.8 NICM (NONISCHEMIC CARDIOMYOPATHY) (HCC): Primary | ICD-10-CM

## 2024-03-28 DIAGNOSIS — G71.00 MUSCULAR DYSTROPHY (HCC): ICD-10-CM

## 2024-03-28 DIAGNOSIS — I47.20 VENTRICULAR TACHYCARDIA (HCC): ICD-10-CM

## 2024-03-28 DIAGNOSIS — I48.0 PAF (PAROXYSMAL ATRIAL FIBRILLATION) (HCC): ICD-10-CM

## 2024-03-28 DIAGNOSIS — I44.2 CHB (COMPLETE HEART BLOCK) (HCC): ICD-10-CM

## 2024-03-28 PROCEDURE — 99214 OFFICE O/P EST MOD 30 MIN: CPT | Performed by: NURSE PRACTITIONER

## 2024-03-28 RX ORDER — FUROSEMIDE 20 MG/1
20 TABLET ORAL DAILY
Qty: 90 TABLET | Refills: 2 | Status: SHIPPED | OUTPATIENT
Start: 2024-03-28

## 2024-03-28 RX ORDER — PANTOPRAZOLE SODIUM 40 MG/1
40 TABLET, DELAYED RELEASE ORAL
Qty: 90 TABLET | Refills: 1 | Status: SHIPPED | OUTPATIENT
Start: 2024-03-28

## 2024-03-28 NOTE — PATIENT INSTRUCTIONS
No change in current heart medicines  Restart the pantoprazole for stomach  Agree with having the ablation procedure next month  Keep appointment with  muscular dystrophy clinic next month  Follow up with me in 1 month

## 2024-03-28 NOTE — PROGRESS NOTES
is abnormal.   2. No evidence of ischemia.   3. Large, severe, fixed perfusion defect in the inferior, inferoseptal, and apical segments. This partially resolves with CT attenuation correction, suggesting component of diaphragm attenuation artifact.   However, there remains a severe apical, distal inferior, and distal inferoseptal defect. Cannot exclude prior infarction vs pacing artifact.   4. Enlarged left ventricle chamber size with reduced systolic function, LVEF 33%.     ECHO 6/3/2016:   Study Conclusions   - Left ventricle: The cavity size was normal. Wall thickness was  normal. Systolic function was severely reduced. The calculated ejection fraction was in the range of 25% to 30%. Severe diffuse     hypokinesis. Doppler parameters are consistent with a reversible restrictive pattern, indicative of decreased left ventricular diastolic compliance and/or increased left atrial pressure (grade 3 diastolic dysfunction).   - Right ventricle: Pacer wire noted in right ventricle.   - Tricuspid valve: There was mild-moderate regurgitation.   - Inferior vena cava: The vessel was normal in size; the respirophasic diameter changes were in the normal range (>= 50%); findings are consistent with normal central venous pressure.     I appreciate the opportunity of cooperating in the care of this individual.    Reyna Thao, DEB - CNP, 3/28/2024, 1:09 PM

## 2024-04-15 ENCOUNTER — ANESTHESIA EVENT (OUTPATIENT)
Dept: CARDIAC CATH/INVASIVE PROCEDURES | Age: 39
End: 2024-04-15
Payer: MEDICAID

## 2024-04-16 ENCOUNTER — ANESTHESIA (OUTPATIENT)
Dept: CARDIAC CATH/INVASIVE PROCEDURES | Age: 39
End: 2024-04-16
Payer: MEDICAID

## 2024-04-16 ENCOUNTER — ANESTHESIA (OUTPATIENT)
Dept: OPERATING ROOM | Age: 39
End: 2024-04-16
Payer: MEDICAID

## 2024-04-16 ENCOUNTER — PROCEDURE VISIT (OUTPATIENT)
Dept: CARDIOLOGY CLINIC | Age: 39
End: 2024-04-16

## 2024-04-16 ENCOUNTER — HOSPITAL ENCOUNTER (INPATIENT)
Dept: CARDIAC CATH/INVASIVE PROCEDURES | Age: 39
LOS: 6 days | Discharge: HOME HEALTH | DRG: 175 | End: 2024-04-22
Attending: INTERNAL MEDICINE | Admitting: INTERNAL MEDICINE
Payer: MEDICAID

## 2024-04-16 ENCOUNTER — APPOINTMENT (OUTPATIENT)
Dept: CT IMAGING | Age: 39
DRG: 175 | End: 2024-04-16
Attending: INTERNAL MEDICINE
Payer: MEDICAID

## 2024-04-16 ENCOUNTER — ANESTHESIA EVENT (OUTPATIENT)
Dept: OPERATING ROOM | Age: 39
End: 2024-04-16
Payer: MEDICAID

## 2024-04-16 DIAGNOSIS — Z95.810 PRESENCE OF CARDIAC RESYNCHRONIZATION THERAPY DEFIBRILLATOR (CRT-D): Primary | ICD-10-CM

## 2024-04-16 DIAGNOSIS — I42.8 NICM (NONISCHEMIC CARDIOMYOPATHY) (HCC): ICD-10-CM

## 2024-04-16 PROBLEM — Z86.79 STATUS POST ABLATION OF VENTRICULAR ARRHYTHMIA: Status: ACTIVE | Noted: 2024-04-16

## 2024-04-16 PROBLEM — K68.3 RETROPERITONEAL HEMATOMA: Status: ACTIVE | Noted: 2024-04-16

## 2024-04-16 PROBLEM — S35.511A INJURY OF RIGHT ILIAC ARTERY: Status: ACTIVE | Noted: 2024-04-16

## 2024-04-16 PROBLEM — Z98.890 STATUS POST ABLATION OF VENTRICULAR ARRHYTHMIA: Status: ACTIVE | Noted: 2024-04-16

## 2024-04-16 LAB
ABO + RH BLD: NORMAL
ANION GAP SERPL CALCULATED.3IONS-SCNC: 10 MMOL/L (ref 3–16)
BLD GP AB SCN SERPL QL: NORMAL
BLOOD BANK DISPENSE STATUS: NORMAL
BLOOD BANK PRODUCT CODE: NORMAL
BPU ID: NORMAL
BUN SERPL-MCNC: 27 MG/DL (ref 7–20)
CALCIUM SERPL-MCNC: 9.3 MG/DL (ref 8.3–10.6)
CHLORIDE SERPL-SCNC: 107 MMOL/L (ref 99–110)
CO2 SERPL-SCNC: 27 MMOL/L (ref 21–32)
CREAT SERPL-MCNC: 1.6 MG/DL (ref 0.9–1.3)
DEPRECATED RDW RBC AUTO: 14.1 % (ref 12.4–15.4)
DEPRECATED RDW RBC AUTO: 14.1 % (ref 12.4–15.4)
DEPRECATED RDW RBC AUTO: 14.4 % (ref 12.4–15.4)
DESCRIPTION BLOOD BANK: NORMAL
EKG ATRIAL RATE: 64 BPM
EKG DIAGNOSIS: NORMAL
EKG Q-T INTERVAL: 508 MS
EKG QRS DURATION: 196 MS
EKG QTC CALCULATION (BAZETT): 508 MS
EKG R AXIS: 197 DEGREES
EKG T AXIS: 55 DEGREES
EKG VENTRICULAR RATE: 60 BPM
GFR SERPLBLD CREATININE-BSD FMLA CKD-EPI: 55 ML/MIN/{1.73_M2}
GLUCOSE SERPL-MCNC: 93 MG/DL (ref 70–99)
HCT VFR BLD AUTO: 36.7 % (ref 40.5–52.5)
HCT VFR BLD AUTO: 40.9 % (ref 40.5–52.5)
HCT VFR BLD AUTO: 44.7 % (ref 40.5–52.5)
HGB BLD-MCNC: 12.2 G/DL (ref 13.5–17.5)
HGB BLD-MCNC: 13.4 G/DL (ref 13.5–17.5)
HGB BLD-MCNC: 15 G/DL (ref 13.5–17.5)
MCH RBC QN AUTO: 29.1 PG (ref 26–34)
MCH RBC QN AUTO: 29.2 PG (ref 26–34)
MCH RBC QN AUTO: 29.4 PG (ref 26–34)
MCHC RBC AUTO-ENTMCNC: 32.9 G/DL (ref 31–36)
MCHC RBC AUTO-ENTMCNC: 33.3 G/DL (ref 31–36)
MCHC RBC AUTO-ENTMCNC: 33.5 G/DL (ref 31–36)
MCV RBC AUTO: 87.6 FL (ref 80–100)
MCV RBC AUTO: 87.7 FL (ref 80–100)
MCV RBC AUTO: 88.9 FL (ref 80–100)
PLATELET # BLD AUTO: 180 K/UL (ref 135–450)
PLATELET # BLD AUTO: 181 K/UL (ref 135–450)
PLATELET # BLD AUTO: 210 K/UL (ref 135–450)
PMV BLD AUTO: 7.8 FL (ref 5–10.5)
PMV BLD AUTO: 7.9 FL (ref 5–10.5)
PMV BLD AUTO: 8 FL (ref 5–10.5)
POTASSIUM SERPL-SCNC: 3.9 MMOL/L (ref 3.5–5.1)
RBC # BLD AUTO: 4.19 M/UL (ref 4.2–5.9)
RBC # BLD AUTO: 4.6 M/UL (ref 4.2–5.9)
RBC # BLD AUTO: 5.1 M/UL (ref 4.2–5.9)
SODIUM SERPL-SCNC: 144 MMOL/L (ref 136–145)
WBC # BLD AUTO: 13 K/UL (ref 4–11)
WBC # BLD AUTO: 17.9 K/UL (ref 4–11)
WBC # BLD AUTO: 6.8 K/UL (ref 4–11)

## 2024-04-16 PROCEDURE — 3700000000 HC ANESTHESIA ATTENDED CARE: Performed by: ANESTHESIOLOGY

## 2024-04-16 PROCEDURE — 2580000003 HC RX 258: Performed by: NURSE ANESTHETIST, CERTIFIED REGISTERED

## 2024-04-16 PROCEDURE — 6360000002 HC RX W HCPCS: Performed by: NURSE ANESTHETIST, CERTIFIED REGISTERED

## 2024-04-16 PROCEDURE — 36415 COLL VENOUS BLD VENIPUNCTURE: CPT

## 2024-04-16 PROCEDURE — 7100000000 HC PACU RECOVERY - FIRST 15 MIN: Performed by: ANESTHESIOLOGY

## 2024-04-16 PROCEDURE — 86901 BLOOD TYPING SEROLOGIC RH(D): CPT

## 2024-04-16 PROCEDURE — 6360000002 HC RX W HCPCS: Performed by: SURGERY

## 2024-04-16 PROCEDURE — 2709999900 HC NON-CHARGEABLE SUPPLY: Performed by: INTERNAL MEDICINE

## 2024-04-16 PROCEDURE — 6370000000 HC RX 637 (ALT 250 FOR IP): Performed by: SURGERY

## 2024-04-16 PROCEDURE — C1769 GUIDE WIRE: HCPCS | Performed by: INTERNAL MEDICINE

## 2024-04-16 PROCEDURE — C1760 CLOSURE DEV, VASC: HCPCS | Performed by: INTERNAL MEDICINE

## 2024-04-16 PROCEDURE — 35226 REPAIR BLOOD VESSEL DIR LXTR: CPT | Performed by: SURGERY

## 2024-04-16 PROCEDURE — C1730 CATH, EP, 19 OR FEW ELECT: HCPCS | Performed by: INTERNAL MEDICINE

## 2024-04-16 PROCEDURE — 2000000000 HC ICU R&B

## 2024-04-16 PROCEDURE — 86850 RBC ANTIBODY SCREEN: CPT

## 2024-04-16 PROCEDURE — 2720000010 HC SURG SUPPLY STERILE: Performed by: INTERNAL MEDICINE

## 2024-04-16 PROCEDURE — 2500000003 HC RX 250 WO HCPCS: Performed by: NURSE ANESTHETIST, CERTIFIED REGISTERED

## 2024-04-16 PROCEDURE — 04QH0ZZ REPAIR RIGHT EXTERNAL ILIAC ARTERY, OPEN APPROACH: ICD-10-PCS | Performed by: SURGERY

## 2024-04-16 PROCEDURE — 4A0234Z MEASUREMENT OF CARDIAC ELECTRICAL ACTIVITY, PERCUTANEOUS APPROACH: ICD-10-PCS | Performed by: INTERNAL MEDICINE

## 2024-04-16 PROCEDURE — 74177 CT ABD & PELVIS W/CONTRAST: CPT

## 2024-04-16 PROCEDURE — C2630 CATH, EP, COOL-TIP: HCPCS | Performed by: INTERNAL MEDICINE

## 2024-04-16 PROCEDURE — 3600000017 HC SURGERY HYBRID ADDL 15MIN: Performed by: SURGERY

## 2024-04-16 PROCEDURE — 6360000002 HC RX W HCPCS: Performed by: ANESTHESIOLOGY

## 2024-04-16 PROCEDURE — 3700000001 HC ADD 15 MINUTES (ANESTHESIA): Performed by: ANESTHESIOLOGY

## 2024-04-16 PROCEDURE — 3700000001 HC ADD 15 MINUTES (ANESTHESIA): Performed by: SURGERY

## 2024-04-16 PROCEDURE — 36430 TRANSFUSION BLD/BLD COMPNT: CPT

## 2024-04-16 PROCEDURE — 3E033XZ INTRODUCTION OF VASOPRESSOR INTO PERIPHERAL VEIN, PERCUTANEOUS APPROACH: ICD-10-PCS | Performed by: INTERNAL MEDICINE

## 2024-04-16 PROCEDURE — C1894 INTRO/SHEATH, NON-LASER: HCPCS | Performed by: INTERNAL MEDICINE

## 2024-04-16 PROCEDURE — 86900 BLOOD TYPING SEROLOGIC ABO: CPT

## 2024-04-16 PROCEDURE — 94761 N-INVAS EAR/PLS OXIMETRY MLT: CPT

## 2024-04-16 PROCEDURE — 3700000000 HC ANESTHESIA ATTENDED CARE: Performed by: SURGERY

## 2024-04-16 PROCEDURE — P9016 RBC LEUKOCYTES REDUCED: HCPCS

## 2024-04-16 PROCEDURE — 4A023FZ MEASUREMENT OF CARDIAC RHYTHM, PERCUTANEOUS APPROACH: ICD-10-PCS | Performed by: INTERNAL MEDICINE

## 2024-04-16 PROCEDURE — C2628 CATHETER, OCCLUSION: HCPCS | Performed by: SURGERY

## 2024-04-16 PROCEDURE — 02K83ZZ MAP CONDUCTION MECHANISM, PERCUTANEOUS APPROACH: ICD-10-PCS | Performed by: INTERNAL MEDICINE

## 2024-04-16 PROCEDURE — 2500000003 HC RX 250 WO HCPCS: Performed by: ANESTHESIOLOGY

## 2024-04-16 PROCEDURE — 74176 CT ABD & PELVIS W/O CONTRAST: CPT

## 2024-04-16 PROCEDURE — 6360000004 HC RX CONTRAST MEDICATION: Performed by: INTERNAL MEDICINE

## 2024-04-16 PROCEDURE — 93662 INTRACARDIAC ECG (ICE): CPT

## 2024-04-16 PROCEDURE — 2580000003 HC RX 258

## 2024-04-16 PROCEDURE — 93662 INTRACARDIAC ECG (ICE): CPT | Performed by: INTERNAL MEDICINE

## 2024-04-16 PROCEDURE — 93654 COMPRE EP EVAL TX VT: CPT

## 2024-04-16 PROCEDURE — 2580000003 HC RX 258: Performed by: SURGERY

## 2024-04-16 PROCEDURE — 02583ZZ DESTRUCTION OF CONDUCTION MECHANISM, PERCUTANEOUS APPROACH: ICD-10-PCS | Performed by: INTERNAL MEDICINE

## 2024-04-16 PROCEDURE — 7100000001 HC PACU RECOVERY - ADDTL 15 MIN: Performed by: ANESTHESIOLOGY

## 2024-04-16 PROCEDURE — 2700000000 HC OXYGEN THERAPY PER DAY

## 2024-04-16 PROCEDURE — 86923 COMPATIBILITY TEST ELECTRIC: CPT

## 2024-04-16 PROCEDURE — C1759 CATH, INTRA ECHOCARDIOGRAPHY: HCPCS | Performed by: INTERNAL MEDICINE

## 2024-04-16 PROCEDURE — 85027 COMPLETE CBC AUTOMATED: CPT

## 2024-04-16 PROCEDURE — 6360000002 HC RX W HCPCS

## 2024-04-16 PROCEDURE — 3600000007 HC SURGERY HYBRID BASE: Performed by: SURGERY

## 2024-04-16 PROCEDURE — 80048 BASIC METABOLIC PNL TOTAL CA: CPT

## 2024-04-16 PROCEDURE — 2709999900 HC NON-CHARGEABLE SUPPLY: Performed by: SURGERY

## 2024-04-16 PROCEDURE — 93005 ELECTROCARDIOGRAM TRACING: CPT | Performed by: INTERNAL MEDICINE

## 2024-04-16 PROCEDURE — 30233N1 TRANSFUSION OF NONAUTOLOGOUS RED BLOOD CELLS INTO PERIPHERAL VEIN, PERCUTANEOUS APPROACH: ICD-10-PCS | Performed by: INTERNAL MEDICINE

## 2024-04-16 PROCEDURE — A4217 STERILE WATER/SALINE, 500 ML: HCPCS | Performed by: SURGERY

## 2024-04-16 PROCEDURE — 93654 COMPRE EP EVAL TX VT: CPT | Performed by: INTERNAL MEDICINE

## 2024-04-16 PROCEDURE — 99254 IP/OBS CNSLTJ NEW/EST MOD 60: CPT | Performed by: SURGERY

## 2024-04-16 PROCEDURE — 93010 ELECTROCARDIOGRAM REPORT: CPT | Performed by: INTERNAL MEDICINE

## 2024-04-16 RX ORDER — HEPARIN SODIUM 10000 [USP'U]/100ML
INJECTION, SOLUTION INTRAVENOUS CONTINUOUS PRN
Status: COMPLETED | OUTPATIENT
Start: 2024-04-16 | End: 2024-04-16

## 2024-04-16 RX ORDER — HEPARIN SODIUM 1000 [USP'U]/ML
INJECTION, SOLUTION INTRAVENOUS; SUBCUTANEOUS
Status: COMPLETED | OUTPATIENT
Start: 2024-04-16 | End: 2024-04-16

## 2024-04-16 RX ORDER — LABETALOL HYDROCHLORIDE 5 MG/ML
5 INJECTION, SOLUTION INTRAVENOUS EVERY 10 MIN PRN
Status: DISCONTINUED | OUTPATIENT
Start: 2024-04-16 | End: 2024-04-16 | Stop reason: HOSPADM

## 2024-04-16 RX ORDER — PROPOFOL 10 MG/ML
INJECTION, EMULSION INTRAVENOUS PRN
Status: DISCONTINUED | OUTPATIENT
Start: 2024-04-16 | End: 2024-04-16 | Stop reason: SDUPTHER

## 2024-04-16 RX ORDER — SODIUM CHLORIDE 0.9 % (FLUSH) 0.9 %
5-40 SYRINGE (ML) INJECTION PRN
Status: DISCONTINUED | OUTPATIENT
Start: 2024-04-16 | End: 2024-04-22 | Stop reason: HOSPADM

## 2024-04-16 RX ORDER — MIDAZOLAM HYDROCHLORIDE 1 MG/ML
INJECTION INTRAMUSCULAR; INTRAVENOUS PRN
Status: DISCONTINUED | OUTPATIENT
Start: 2024-04-16 | End: 2024-04-16 | Stop reason: SDUPTHER

## 2024-04-16 RX ORDER — SODIUM CHLORIDE 9 MG/ML
INJECTION, SOLUTION INTRAVENOUS PRN
Status: DISCONTINUED | OUTPATIENT
Start: 2024-04-16 | End: 2024-04-22 | Stop reason: HOSPADM

## 2024-04-16 RX ORDER — ONDANSETRON 2 MG/ML
4 INJECTION INTRAMUSCULAR; INTRAVENOUS
Status: COMPLETED | OUTPATIENT
Start: 2024-04-16 | End: 2024-04-16

## 2024-04-16 RX ORDER — ONDANSETRON 2 MG/ML
4 INJECTION INTRAMUSCULAR; INTRAVENOUS EVERY 6 HOURS PRN
Status: DISCONTINUED | OUTPATIENT
Start: 2024-04-16 | End: 2024-04-16

## 2024-04-16 RX ORDER — SODIUM CHLORIDE 9 MG/ML
INJECTION, SOLUTION INTRAVENOUS PRN
Status: DISCONTINUED | OUTPATIENT
Start: 2024-04-16 | End: 2024-04-16

## 2024-04-16 RX ORDER — SODIUM CHLORIDE 0.9 % (FLUSH) 0.9 %
5-40 SYRINGE (ML) INJECTION EVERY 12 HOURS SCHEDULED
Status: DISCONTINUED | OUTPATIENT
Start: 2024-04-16 | End: 2024-04-22 | Stop reason: HOSPADM

## 2024-04-16 RX ORDER — SODIUM CHLORIDE, SODIUM LACTATE, POTASSIUM CHLORIDE, CALCIUM CHLORIDE 600; 310; 30; 20 MG/100ML; MG/100ML; MG/100ML; MG/100ML
INJECTION, SOLUTION INTRAVENOUS CONTINUOUS PRN
Status: DISCONTINUED | OUTPATIENT
Start: 2024-04-16 | End: 2024-04-16 | Stop reason: SDUPTHER

## 2024-04-16 RX ORDER — ROCURONIUM BROMIDE 10 MG/ML
INJECTION, SOLUTION INTRAVENOUS PRN
Status: DISCONTINUED | OUTPATIENT
Start: 2024-04-16 | End: 2024-04-16 | Stop reason: SDUPTHER

## 2024-04-16 RX ORDER — KETAMINE HCL IN NACL, ISO-OSM 100MG/10ML
SYRINGE (ML) INJECTION PRN
Status: DISCONTINUED | OUTPATIENT
Start: 2024-04-16 | End: 2024-04-16 | Stop reason: SDUPTHER

## 2024-04-16 RX ORDER — OXYCODONE HYDROCHLORIDE 5 MG/1
10 TABLET ORAL EVERY 4 HOURS PRN
Status: DISCONTINUED | OUTPATIENT
Start: 2024-04-16 | End: 2024-04-22 | Stop reason: HOSPADM

## 2024-04-16 RX ORDER — METOPROLOL SUCCINATE 50 MG/1
100 TABLET, EXTENDED RELEASE ORAL DAILY
Status: DISCONTINUED | OUTPATIENT
Start: 2024-04-16 | End: 2024-04-20

## 2024-04-16 RX ORDER — ACETAMINOPHEN 650 MG/1
650 SUPPOSITORY RECTAL EVERY 6 HOURS PRN
Status: DISCONTINUED | OUTPATIENT
Start: 2024-04-16 | End: 2024-04-17 | Stop reason: SDUPTHER

## 2024-04-16 RX ORDER — CEFAZOLIN SODIUM 1 G/3ML
INJECTION, POWDER, FOR SOLUTION INTRAMUSCULAR; INTRAVENOUS PRN
Status: DISCONTINUED | OUTPATIENT
Start: 2024-04-16 | End: 2024-04-16 | Stop reason: SDUPTHER

## 2024-04-16 RX ORDER — CEFAZOLIN SODIUM IN 0.9 % NACL 2 G/100 ML
2000 PLASTIC BAG, INJECTION (ML) INTRAVENOUS
Status: COMPLETED | OUTPATIENT
Start: 2024-04-16 | End: 2024-04-16

## 2024-04-16 RX ORDER — FENTANYL CITRATE 50 UG/ML
INJECTION, SOLUTION INTRAMUSCULAR; INTRAVENOUS PRN
Status: DISCONTINUED | OUTPATIENT
Start: 2024-04-16 | End: 2024-04-16 | Stop reason: SDUPTHER

## 2024-04-16 RX ORDER — MORPHINE SULFATE 4 MG/ML
4 INJECTION, SOLUTION INTRAMUSCULAR; INTRAVENOUS
Status: DISCONTINUED | OUTPATIENT
Start: 2024-04-16 | End: 2024-04-22 | Stop reason: HOSPADM

## 2024-04-16 RX ORDER — FENTANYL CITRATE 50 UG/ML
50 INJECTION, SOLUTION INTRAMUSCULAR; INTRAVENOUS ONCE
Status: COMPLETED | OUTPATIENT
Start: 2024-04-16 | End: 2024-04-16

## 2024-04-16 RX ORDER — ACETAMINOPHEN 325 MG/1
650 TABLET ORAL EVERY 6 HOURS PRN
Status: DISCONTINUED | OUTPATIENT
Start: 2024-04-16 | End: 2024-04-17 | Stop reason: SDUPTHER

## 2024-04-16 RX ORDER — ACETAMINOPHEN 325 MG/1
650 TABLET ORAL EVERY 4 HOURS PRN
Status: DISCONTINUED | OUTPATIENT
Start: 2024-04-16 | End: 2024-04-22 | Stop reason: HOSPADM

## 2024-04-16 RX ORDER — SODIUM CHLORIDE 0.9 % (FLUSH) 0.9 %
5-40 SYRINGE (ML) INJECTION EVERY 12 HOURS SCHEDULED
Status: DISCONTINUED | OUTPATIENT
Start: 2024-04-16 | End: 2024-04-16 | Stop reason: HOSPADM

## 2024-04-16 RX ORDER — NALOXONE HYDROCHLORIDE 0.4 MG/ML
INJECTION, SOLUTION INTRAMUSCULAR; INTRAVENOUS; SUBCUTANEOUS PRN
Status: DISCONTINUED | OUTPATIENT
Start: 2024-04-16 | End: 2024-04-16 | Stop reason: HOSPADM

## 2024-04-16 RX ORDER — MORPHINE SULFATE 2 MG/ML
2 INJECTION, SOLUTION INTRAMUSCULAR; INTRAVENOUS
Status: DISCONTINUED | OUTPATIENT
Start: 2024-04-16 | End: 2024-04-22 | Stop reason: HOSPADM

## 2024-04-16 RX ORDER — SODIUM CHLORIDE 0.9 % (FLUSH) 0.9 %
5-40 SYRINGE (ML) INJECTION EVERY 12 HOURS SCHEDULED
Status: DISCONTINUED | OUTPATIENT
Start: 2024-04-16 | End: 2024-04-16

## 2024-04-16 RX ORDER — PROTAMINE SULFATE 10 MG/ML
INJECTION, SOLUTION INTRAVENOUS PRN
Status: DISCONTINUED | OUTPATIENT
Start: 2024-04-16 | End: 2024-04-16 | Stop reason: SDUPTHER

## 2024-04-16 RX ORDER — FUROSEMIDE 20 MG/1
20 TABLET ORAL DAILY
Status: DISCONTINUED | OUTPATIENT
Start: 2024-04-16 | End: 2024-04-18

## 2024-04-16 RX ORDER — LORAZEPAM 0.5 MG/1
0.5 TABLET ORAL
Status: ACTIVE | OUTPATIENT
Start: 2024-04-16 | End: 2024-04-17

## 2024-04-16 RX ORDER — OXYCODONE HYDROCHLORIDE 5 MG/1
10 TABLET ORAL PRN
Status: DISCONTINUED | OUTPATIENT
Start: 2024-04-16 | End: 2024-04-16 | Stop reason: HOSPADM

## 2024-04-16 RX ORDER — ONDANSETRON 2 MG/ML
INJECTION INTRAMUSCULAR; INTRAVENOUS PRN
Status: DISCONTINUED | OUTPATIENT
Start: 2024-04-16 | End: 2024-04-16 | Stop reason: SDUPTHER

## 2024-04-16 RX ORDER — AMIODARONE HYDROCHLORIDE 200 MG/1
200 TABLET ORAL DAILY
Status: DISCONTINUED | OUTPATIENT
Start: 2024-04-17 | End: 2024-04-22 | Stop reason: HOSPADM

## 2024-04-16 RX ORDER — SODIUM CHLORIDE 0.9 % (FLUSH) 0.9 %
5-40 SYRINGE (ML) INJECTION PRN
Status: DISCONTINUED | OUTPATIENT
Start: 2024-04-16 | End: 2024-04-16 | Stop reason: HOSPADM

## 2024-04-16 RX ORDER — OXYCODONE HYDROCHLORIDE 5 MG/1
5 TABLET ORAL PRN
Status: DISCONTINUED | OUTPATIENT
Start: 2024-04-16 | End: 2024-04-16 | Stop reason: HOSPADM

## 2024-04-16 RX ORDER — MEXILETINE HYDROCHLORIDE 200 MG/1
200 CAPSULE ORAL 2 TIMES DAILY
Status: DISCONTINUED | OUTPATIENT
Start: 2024-04-16 | End: 2024-04-22 | Stop reason: HOSPADM

## 2024-04-16 RX ORDER — DIPHENHYDRAMINE HYDROCHLORIDE 50 MG/ML
12.5 INJECTION INTRAMUSCULAR; INTRAVENOUS
Status: DISCONTINUED | OUTPATIENT
Start: 2024-04-16 | End: 2024-04-16 | Stop reason: HOSPADM

## 2024-04-16 RX ORDER — OXYCODONE HYDROCHLORIDE 5 MG/1
5 TABLET ORAL EVERY 4 HOURS PRN
Status: DISCONTINUED | OUTPATIENT
Start: 2024-04-16 | End: 2024-04-22 | Stop reason: HOSPADM

## 2024-04-16 RX ORDER — POLYETHYLENE GLYCOL 3350 17 G/17G
17 POWDER, FOR SOLUTION ORAL DAILY PRN
Status: DISCONTINUED | OUTPATIENT
Start: 2024-04-16 | End: 2024-04-22 | Stop reason: HOSPADM

## 2024-04-16 RX ORDER — ONDANSETRON 4 MG/1
4 TABLET, ORALLY DISINTEGRATING ORAL EVERY 8 HOURS PRN
Status: DISCONTINUED | OUTPATIENT
Start: 2024-04-16 | End: 2024-04-16

## 2024-04-16 RX ORDER — PANTOPRAZOLE SODIUM 40 MG/1
40 TABLET, DELAYED RELEASE ORAL
Status: DISCONTINUED | OUTPATIENT
Start: 2024-04-17 | End: 2024-04-22 | Stop reason: HOSPADM

## 2024-04-16 RX ADMIN — ONDANSETRON 4 MG: 2 INJECTION INTRAMUSCULAR; INTRAVENOUS at 13:23

## 2024-04-16 RX ADMIN — FENTANYL CITRATE 50 MCG: 50 INJECTION, SOLUTION INTRAMUSCULAR; INTRAVENOUS at 14:49

## 2024-04-16 RX ADMIN — ROCURONIUM BROMIDE 30 MG: 50 INJECTION, SOLUTION INTRAVENOUS at 11:49

## 2024-04-16 RX ADMIN — IOPAMIDOL 75 ML: 755 INJECTION, SOLUTION INTRAVENOUS at 18:01

## 2024-04-16 RX ADMIN — SUGAMMADEX 200 MG: 100 INJECTION, SOLUTION INTRAVENOUS at 19:27

## 2024-04-16 RX ADMIN — MORPHINE SULFATE 4 MG: 4 INJECTION, SOLUTION INTRAMUSCULAR; INTRAVENOUS at 23:14

## 2024-04-16 RX ADMIN — HEPARIN SODIUM 10000 UNITS: 1000 INJECTION, SOLUTION INTRAVENOUS; SUBCUTANEOUS at 09:44

## 2024-04-16 RX ADMIN — Medication 20 MG: at 18:29

## 2024-04-16 RX ADMIN — SACUBITRIL AND VALSARTAN 1.5 TABLET: 24; 26 TABLET, FILM COATED ORAL at 20:35

## 2024-04-16 RX ADMIN — Medication 2000 MG: at 18:44

## 2024-04-16 RX ADMIN — ROCURONIUM BROMIDE 100 MG: 50 INJECTION, SOLUTION INTRAVENOUS at 18:36

## 2024-04-16 RX ADMIN — PROPOFOL 25 MG: 10 INJECTION, EMULSION INTRAVENOUS at 18:32

## 2024-04-16 RX ADMIN — MEXILETINE HYDROCHLORIDE 200 MG: 200 CAPSULE ORAL at 20:35

## 2024-04-16 RX ADMIN — HEPARIN SODIUM 3000 UNITS: 1000 INJECTION, SOLUTION INTRAVENOUS; SUBCUTANEOUS at 11:59

## 2024-04-16 RX ADMIN — FENTANYL CITRATE 50 MCG: 50 INJECTION, SOLUTION INTRAMUSCULAR; INTRAVENOUS at 15:59

## 2024-04-16 RX ADMIN — CEFAZOLIN 1 G: 1 INJECTION, POWDER, FOR SOLUTION INTRAMUSCULAR; INTRAVENOUS at 08:39

## 2024-04-16 RX ADMIN — PROTAMINE SULFATE 40 MG: 10 INJECTION, SOLUTION INTRAVENOUS at 12:43

## 2024-04-16 RX ADMIN — SODIUM CHLORIDE, SODIUM LACTATE, POTASSIUM CHLORIDE, AND CALCIUM CHLORIDE: .6; .31; .03; .02 INJECTION, SOLUTION INTRAVENOUS at 08:26

## 2024-04-16 RX ADMIN — PROTAMINE SULFATE 5 MG: 10 INJECTION, SOLUTION INTRAVENOUS at 12:40

## 2024-04-16 RX ADMIN — ONDANSETRON 4 MG: 2 INJECTION INTRAMUSCULAR; INTRAVENOUS at 08:26

## 2024-04-16 RX ADMIN — MORPHINE SULFATE 2 MG: 2 INJECTION, SOLUTION INTRAMUSCULAR; INTRAVENOUS at 19:58

## 2024-04-16 RX ADMIN — ROCURONIUM BROMIDE 50 MG: 50 INJECTION, SOLUTION INTRAVENOUS at 08:26

## 2024-04-16 RX ADMIN — CEFAZOLIN 1 G: 1 INJECTION, POWDER, FOR SOLUTION INTRAMUSCULAR; INTRAVENOUS at 12:31

## 2024-04-16 RX ADMIN — FENTANYL CITRATE 25 MCG: 50 INJECTION, SOLUTION INTRAMUSCULAR; INTRAVENOUS at 18:32

## 2024-04-16 RX ADMIN — FENTANYL CITRATE 50 MCG: 50 INJECTION, SOLUTION INTRAMUSCULAR; INTRAVENOUS at 11:49

## 2024-04-16 RX ADMIN — SODIUM CHLORIDE, SODIUM LACTATE, POTASSIUM CHLORIDE, AND CALCIUM CHLORIDE: .6; .31; .03; .02 INJECTION, SOLUTION INTRAVENOUS at 18:29

## 2024-04-16 RX ADMIN — ONDANSETRON 4 MG: 2 INJECTION INTRAMUSCULAR; INTRAVENOUS at 13:46

## 2024-04-16 RX ADMIN — HYDROMORPHONE HYDROCHLORIDE 0.5 MG: 1 INJECTION, SOLUTION INTRAMUSCULAR; INTRAVENOUS; SUBCUTANEOUS at 13:45

## 2024-04-16 RX ADMIN — PROPOFOL 50 MG: 10 INJECTION, EMULSION INTRAVENOUS at 08:26

## 2024-04-16 RX ADMIN — FENTANYL CITRATE 50 MCG: 50 INJECTION, SOLUTION INTRAMUSCULAR; INTRAVENOUS at 12:58

## 2024-04-16 RX ADMIN — OXYCODONE HYDROCHLORIDE 10 MG: 5 TABLET ORAL at 20:35

## 2024-04-16 RX ADMIN — ONDANSETRON 4 MG: 2 INJECTION INTRAMUSCULAR; INTRAVENOUS at 17:03

## 2024-04-16 RX ADMIN — HEPARIN SODIUM 1300 UNITS/HR: 10000 INJECTION, SOLUTION INTRAVENOUS at 09:45

## 2024-04-16 RX ADMIN — SUGAMMADEX 200 MG: 100 INJECTION, SOLUTION INTRAVENOUS at 12:57

## 2024-04-16 RX ADMIN — PROPOFOL 25 MG: 10 INJECTION, EMULSION INTRAVENOUS at 18:36

## 2024-04-16 RX ADMIN — Medication 10 ML: at 20:42

## 2024-04-16 RX ADMIN — MIDAZOLAM 2 MG: 1 INJECTION INTRAMUSCULAR; INTRAVENOUS at 08:26

## 2024-04-16 RX ADMIN — HYDROMORPHONE HYDROCHLORIDE 0.5 MG: 1 INJECTION, SOLUTION INTRAMUSCULAR; INTRAVENOUS; SUBCUTANEOUS at 13:24

## 2024-04-16 RX ADMIN — MIDAZOLAM 2 MG: 1 INJECTION INTRAMUSCULAR; INTRAVENOUS at 18:29

## 2024-04-16 RX ADMIN — HEPARIN SODIUM 2000 UNITS: 1000 INJECTION, SOLUTION INTRAVENOUS; SUBCUTANEOUS at 10:27

## 2024-04-16 RX ADMIN — SODIUM CHLORIDE 1 MCG/MIN: 0.9 INJECTION, SOLUTION INTRAVENOUS at 08:37

## 2024-04-16 ASSESSMENT — PAIN DESCRIPTION - LOCATION
LOCATION: GROIN
LOCATION: LEG
LOCATION: GROIN
LOCATION: GROIN

## 2024-04-16 ASSESSMENT — PAIN SCALES - GENERAL
PAINLEVEL_OUTOF10: 10
PAINLEVEL_OUTOF10: 8
PAINLEVEL_OUTOF10: 8
PAINLEVEL_OUTOF10: 3
PAINLEVEL_OUTOF10: 0
PAINLEVEL_OUTOF10: 7
PAINLEVEL_OUTOF10: 7

## 2024-04-16 ASSESSMENT — PAIN DESCRIPTION - DESCRIPTORS
DESCRIPTORS: ACHING;BURNING
DESCRIPTORS: OTHER (COMMENT)
DESCRIPTORS: DULL;ACHING
DESCRIPTORS: ACHING

## 2024-04-16 ASSESSMENT — PAIN DESCRIPTION - ORIENTATION
ORIENTATION: RIGHT

## 2024-04-16 ASSESSMENT — PAIN DESCRIPTION - PAIN TYPE: TYPE: SURGICAL PAIN

## 2024-04-16 NOTE — ANESTHESIA PRE PROCEDURE
(complete heart block)):, dysrhythmias: atrial fibrillation and SVT, CHF:, pulmonary hypertension: moderate, hyperlipidemia               ROS comment: PFO (patent foramen ovale)     Neuro/Psych:   Negative Neuro/Psych ROS  (+) neuromuscular disease:            GI/Hepatic/Renal: Neg GI/Hepatic/Renal ROS       (-) hiatal hernia and GERD       Endo/Other: Negative Endo/Other ROS                    Abdominal:             Vascular:          Other Findings:           Anesthesia Plan      general     ASA 4     (I discussed with the patient the risks and benefits of PIV, general anesthesia, IV Narcotics, PACU.  All questions were answered the patient agrees with the plan and wishes to proceed.  )  Induction: intravenous.                  ECHO 11/25/23:   Summary   The left ventricular systolic function is severely reduced with an ejection fraction of <20%.   The anterior wall, septal walls, and lateral walls are severely hypokinetic to akinetic. Inferior walls are hypokinetic.   Left ventricular cavity size is moderately dilated with normal left ventricular wall thickness.   Indeterminate diastolic function.   Normal right ventricular size.   Right ventricular systolic function is mildly reduced   Moderate Bi-atrial enlargement.   Mild mitral regurgitation.   There is some degree of posterior leaflet restriction resulting in an eccentric posteriorly directed jet.   This may be underestimated.   Mild to moderate tricuspid regurgitation.   Systolic pulmonic artery pressure (SPAP) is estimated at 54 mmHg consistent with moderate pulmonary hypertension (Right atrial pressure of 8 mmHg).        Echo - 8/1/2023   Definity® used for myocardial border enhancement.   Left ventricular systolic function is severely reduced with a visually estimated ejection fraction of 25-30 %.   EF estimated by Smith's method at 25 %.   The left ventricle is severely dilated in size.   Prominent trabeculation noted consistent with known diagnosis

## 2024-04-16 NOTE — ANESTHESIA POSTPROCEDURE EVALUATION
Department of Anesthesiology  Postprocedure Note    Patient: Alex Isaac  MRN: [id number]  YOB: 1985  Date of evaluation: 4/16/2024    Procedure Summary       Date: 04/16/24 Room / Location: [facility]iac Cath Lab    Anesthesia Start: 0826 Anesthesia Stop: 1328    Procedure: ABLATION Diagnosis:       Ventricular tachycardia, unspecified      Status post ablation of ventricular arrhythmia    Scheduled Providers:  Responsible Provider: Heath Hammond MD    Anesthesia Type: general ASA Status: 4            Anesthesia Type: No value filed.    Galen Phase I: Galen Score: 10    Galen Phase II:      Anesthesia Post Evaluation    Comments: Postoperative Anesthesia Note    Name:    Alex Isaac  MRN:      [id number]    Patient Vitals in the past 12 hrs:  04/16/24 1600, BP:121/87, Pulse:60, Resp:18, SpO2:100 %  04/16/24 1545, BP:(!) 126/100, Pulse:60, Resp:23, SpO2:100 %  04/16/24 1530, BP:(!) 125/96, Pulse:60, Resp:13, SpO2:100 %  04/16/24 1520, BP:(!) 114/98, Pulse:60, Resp:(!) 36, SpO2:100 %  04/16/24 1515, BP:(!) 131/97, Pulse:60, Resp:23, SpO2:100 %  04/16/24 1510, BP:(!) 134/108, Pulse:60, Resp:19, SpO2:100 %  04/16/24 1505, BP:(!) 133/101, Pulse:60, Resp:15, SpO2:100 %  04/16/24 1500, BP:(!) 136/107, Pulse:60, Resp:15, SpO2:100 %  04/16/24 1456, Pulse:60, Resp:18, SpO2:100 %  04/16/24 1445, BP:(!) 141/94, Pulse:60, Resp:21, SpO2:100 %  04/16/24 1430, BP:(!) 128/90, Pulse:60, Resp:19, SpO2:100 %  04/16/24 1415, BP:(!) 123/94, Pulse:60, Resp:14, SpO2:100 %  04/16/24 1400, BP:(!) 126/93, Pulse:60, Resp:13, SpO2:100 %  04/16/24 1348, BP:118/83, Temp:96.8 °F (36 °C), Temp src:Tympanic, SpO2:98 %  04/16/24 1346, Pulse:60, Resp:21, SpO2:100 %  04/16/24 1345, BP:118/83, Pulse:60, Resp:17, SpO2:97 %  04/16/24 1331, BP:(!) 118/93, Pulse:80, Resp:18, SpO2:93 %  04/16/24 1316, BP:(!) 121/95, Temp:97.3 °F (36.3 °C), Temp src:Tympanic, Pulse:80, Resp:23, SpO2:93 %  04/16/24 0654, BP:(!) 140/109, Pulse:60, SpO2:99

## 2024-04-16 NOTE — CONSENT
Informed Consent for Blood Component Transfusion Note    I have discussed with the patient the rationale for blood component transfusion; its benefits in treating or preventing fatigue, organ damage, or death; and its risk which includes mild transfusion reactions, rare risk of blood borne infection, or more serious but rare reactions. I have discussed the alternatives to transfusion, including the risk and consequences of not receiving transfusion. The patient had an opportunity to ask questions and had agreed to proceed with transfusion of blood components.    Electronically signed by Marta Mckeon MD on 4/16/24 at 5:02 PM EDT

## 2024-04-16 NOTE — BRIEF OP NOTE
Brief Postoperative Note      Patient: Alex Isaac  YOB: 1985  MRN: [id number]    Date of Procedure: 4/16/2024    Pre-Op Diagnosis Codes:     * Hematoma [T14.8XXA] (Retroperitoneal)  Iliac artery injury    Post-Op Diagnosis: Same       Procedure(s):  REPAIR RIGHT ILIAC ARTERY    Surgeon(s):  Sergio Sanders MD    Assistant:  Surgical Assistant: Juancho Maldonado; Mary Levi    Anesthesia: * No anesthesia type entered *    Estimated Blood Loss (mL): Minimal    Complications: None    Specimens:   * No specimens in log *    Implants:  * No implants in log *      Drains:   Urinary Catheter 04/16/24 Jeffery-Temperature (Active)       Findings:  Infection Present At Time Of Surgery (PATOS) (choose all levels that have infection present):  No infection present  Other Findings:  Anterior wall defect in external Iliac artery- primary repair performed.     Electronically signed by Sergio Sanders MD on 4/16/2024 at 7:24 PM

## 2024-04-16 NOTE — PROCEDURES
ablation did not result in making the patient completely non-inducible.    Evidence was highly suggestive of mostly epicardial and intra-myocardial scar burden. Further endocardial ablation is unlikely to result in VT elimination. Consideration of epicardial VT ablation would be needed. Decision made to terminate the ablation procedure and to monitor patient clinically with further discussions with him and his family.      Following this decision, 45 mg of protamine was given to reverse the heparin effect.  Venous sheaths were removed and hemostasis achieved with use of the Vascade venous closure devices (as per usual protocol) followed by manual compression. The long right femoral arterial sheath was exchanged for a short 9 Fr arterial sheath that was sutured in place. That sheath was to be removed in pre-post area. The patient was transported to the holding area in stable condition.    Summary:  Electrophysiology study  Substrate-based ablation of non-ischemic, scar-based, ventricular tachycardia     Recommendations:  1. Bedrest   2. Monitor on telemetry  3. ECG upon arrival and in AM  4. Follow up in the EP clinic to be determined  5. Consideration of epicardial VT ablation

## 2024-04-16 NOTE — ANESTHESIA PROCEDURE NOTES
Arterial Line:    An arterial line was placed using surface landmarks, in the OR for the following indication(s): continuous blood pressure monitoring and blood sampling needed.    A 20 gauge (size), 1 and 3/8 inch (length), Angiocath (type) catheter was placed, Seldinger technique not used, into the right radial artery, secured by tape.  Anesthesia type: Local    Events:  patient tolerated procedure well with no complications and EBL 0mL.    Additional notes:  Ultrasound guided in realtime.  Constant visualization of tip of needle 1 pass.  Unable to print picture, printer malfunctioning4/16/2024 6:42 PM4/16/2024 6:45 PM  Anesthesiologist: Heath Hammond MD  Performed: Anesthesiologist   Preanesthetic Checklist  Completed: patient identified, IV checked, site marked, risks and benefits discussed, surgical/procedural consents, equipment checked, pre-op evaluation, timeout performed, anesthesia consent given, oxygen available and monitors applied/VS acknowledged

## 2024-04-16 NOTE — ANESTHESIA PRE PROCEDURE
tachycardia, unspecified [I47.20];Status post ablation of ventricular arrhythmia [Z98.890, Z86.79];Ventricular tachycardia (HCC) [I47.20]    38 y.o.   BMI:  Body mass index is 24.63 kg/m².     Vitals:    04/16/24 1728 04/16/24 1730 04/16/24 1737 04/16/24 1745   BP: 93/62 92/69 93/69 94/67   Pulse: 60 60 60 62   Resp: 17 (!) 9 12 10   Temp:   97.9 °F (36.6 °C)    TempSrc:       SpO2: 100% 100% 100% 100%   Weight:       Height:           Allergies   Allergen Reactions   • Bee Venom      hives   • Onion Angioedema       Social History     Tobacco Use   • Smoking status: Former     Current packs/day: 0.25     Average packs/day: 0.3 packs/day for 16.3 years (4.1 ttl pk-yrs)     Types: Cigarettes     Start date: 2008   • Smokeless tobacco: Not on file   Substance Use Topics   • Alcohol use: No     Comment: rarely     LABS:    CBC  Lab Results   Component Value Date/Time    WBC 13.0 (H) 04/16/2024 04:12 PM    HGB 12.2 (L) 04/16/2024 04:12 PM    HCT 36.7 (L) 04/16/2024 04:12 PM     04/16/2024 04:12 PM     RENAL  Lab Results   Component Value Date/Time     04/16/2024 07:12 AM    K 3.9 04/16/2024 07:12 AM     04/16/2024 07:12 AM    CO2 27 04/16/2024 07:12 AM    BUN 27 (H) 04/16/2024 07:12 AM    CREATININE 1.6 (H) 04/16/2024 07:12 AM    GLUCOSE 93 04/16/2024 07:12 AM     COAGS  Lab Results   Component Value Date/Time    PROTIME 15.8 (H) 02/05/2024 11:53 AM    INR 1.26 (H) 02/05/2024 11:53 AM    APTT 28.5 07/31/2023 12:26 PM       Heath Hammond MD   4/16/2024

## 2024-04-16 NOTE — H&P
place.    Retroperitoneal bleed : post procedure he did complain of right abdominal and right scrotal pain.  CT scan abdomen pelvis without contrast was performed.  This did reveal a large retroperitoneal hematoma on the right side which extended into the right scrotal sac.  Hb/HCT pre procedure= 15/44.7  Postprocedure= 12.2/36.7     He is now being transfused a unit of PRBCs, blood pressure is tending a little low.  He is receiving IV fluids.  Vascular surgery has been consulted and they have requested a CT abdomen pelvis to be done with contrast and he will go for that shortly  Will follow H/H every 4 hours transfuse as is clinically indicated.    Of pertinence is he was receiving full anticoagulation IV heparin during the procedure today this is now been discontinued and reversed.  He was on oral anticoagulation with Eliquis prior to this admission.  This was stopped 2 days prior to the procedure.    Paroxysmal atrial fibrillation.  History is noted, he was maintained on beta-blocker and amiodarone and anticoagulation with Eliquis.   Anticoagulation /Eliquis is now being held due to the retroperitoneal bleed.  Will continue to follow on telemetry.    Nonischemic cardiomyopathy.  EF was less than 20%.  Continue to monitor closely and follow volume status.   continue Entresto, and Lasix      Muscular dystrophy familial type: . Patient dependent on wheelchair at home.  Seems to be at his baseline status     He will be admitted to the ICU.  Will follow blood pressure, his provide supportive care.  Transfuse PRBCs and use pressors as needed to maintain his hemodynamic status      Physical Exam Performed:      BP 93/62   Pulse 60   Temp 98.1 °F (36.7 °C)   Resp 17   Ht 1.651 m (5' 5\")   Wt 67.1 kg (148 lb)   SpO2 100%   BMI 24.63 kg/m²     General appearance: He is lying in bed, complaining of pain in the right side of his abdomen.  He is alert and cooperative.  HEENT:  Pupils equal, round, and reactive to 
respiration   consistent with elevated RA pressure (15 mmHg).     M-Mode/2D Measurements (cm)      LV Diastolic Dimension: 6.61 cm LV Systolic Dimension: 5.87 cm   LV Septum Diastolic: 1.16 cm   LV PW Diastolic: 1.01 cm        AO Root Dimension: 2.9 cm                                   AV Cusp Separation: 1.8 cm                                   LA Dimension: 4.8 cm                                   LA Area: 30.55 cm2                                   LA volume/Index: 77 ml /45 ml/m2     Doppler Measurements      AV Peak Velocity: 91.9 cm/s    MV Peak E-Wave: 76.3 cm/s   AV Peak Gradient: 3.38 mmHg    MV Peak A-Wave: 33.4 cm/s                                  MV E/A Ratio: 2.28      TR Velocity:311 cm/s           MV Max P mmHg   TR Gradient:38.69 mmHg         MV Vmax:365 cm/s   Estimated RAP:15 mmHg   Estimated RVSP: 54 mmHg   E' Septal Velocity: 3.92 cm/s  MV Deceleration Time: 211 msec   E' Lateral Velocity: 8.38 cm/s Area (PISA): 0.12 cm2   E/E' ratio: 14.3               KRYSTIN PISA: 0.12 cm2   PV Peak Velocity: 73.3 cm/s    MR PISA Radius: 0.5 cm   PV Peak Gradient: 2.15 mmHg      Aortic Valve      Peak Velocity: 91.9 cm/s   Peak Gradient: 3.38 mmHg      Cusp Separation: 1.8 cm     Aorta      Aortic Root: 2.9 cm          Telemetry:    AV paced rhythm    Lab Review:    Recent Labs     24  0712   WBC 6.8   HGB 15.0   HCT 44.7   MCV 87.7          Recent Labs     24  0712      K 3.9      CO2 27   BUN 27*   CREATININE 1.6*       No results for input(s): \"INR\", \"PROTIME\" in the last 72 hours.    No results for input(s): \"CKMB\", \"TROPONINI\" in the last 72 hours.    No results for input(s): \"PROBNP\" in the last 72 hours.        Signed by: Marta Mckeon MD

## 2024-04-17 LAB
ALBUMIN SERPL-MCNC: 3.5 G/DL (ref 3.4–5)
ALBUMIN/GLOB SERPL: 1.5 {RATIO} (ref 1.1–2.2)
ALP SERPL-CCNC: 85 U/L (ref 40–129)
ALT SERPL-CCNC: 151 U/L (ref 10–40)
ANION GAP SERPL CALCULATED.3IONS-SCNC: 10 MMOL/L (ref 3–16)
AST SERPL-CCNC: 176 U/L (ref 15–37)
BILIRUB SERPL-MCNC: 0.7 MG/DL (ref 0–1)
BUN SERPL-MCNC: 19 MG/DL (ref 7–20)
CALCIUM SERPL-MCNC: 8.2 MG/DL (ref 8.3–10.6)
CHLORIDE SERPL-SCNC: 106 MMOL/L (ref 99–110)
CO2 SERPL-SCNC: 22 MMOL/L (ref 21–32)
CREAT SERPL-MCNC: 0.9 MG/DL (ref 0.9–1.3)
DEPRECATED RDW RBC AUTO: 14.5 % (ref 12.4–15.4)
EKG ATRIAL RATE: 30 BPM
EKG DIAGNOSIS: NORMAL
EKG Q-T INTERVAL: 510 MS
EKG QRS DURATION: 172 MS
EKG QTC CALCULATION (BAZETT): 510 MS
EKG R AXIS: 181 DEGREES
EKG T AXIS: 22 DEGREES
EKG VENTRICULAR RATE: 60 BPM
GFR SERPLBLD CREATININE-BSD FMLA CKD-EPI: >90 ML/MIN/{1.73_M2}
GLUCOSE SERPL-MCNC: 124 MG/DL (ref 70–99)
HCT VFR BLD AUTO: 32.8 % (ref 40.5–52.5)
HGB BLD-MCNC: 11 G/DL (ref 13.5–17.5)
MAGNESIUM SERPL-MCNC: 2.2 MG/DL (ref 1.8–2.4)
MCH RBC QN AUTO: 29.6 PG (ref 26–34)
MCHC RBC AUTO-ENTMCNC: 33.5 G/DL (ref 31–36)
MCV RBC AUTO: 88.3 FL (ref 80–100)
PLATELET # BLD AUTO: 150 K/UL (ref 135–450)
PMV BLD AUTO: 8.5 FL (ref 5–10.5)
POTASSIUM SERPL-SCNC: 4 MMOL/L (ref 3.5–5.1)
POTASSIUM SERPL-SCNC: 4 MMOL/L (ref 3.5–5.1)
PROT SERPL-MCNC: 5.9 G/DL (ref 6.4–8.2)
RBC # BLD AUTO: 3.72 M/UL (ref 4.2–5.9)
SODIUM SERPL-SCNC: 138 MMOL/L (ref 136–145)
WBC # BLD AUTO: 12.3 K/UL (ref 4–11)

## 2024-04-17 PROCEDURE — 2580000003 HC RX 258: Performed by: SURGERY

## 2024-04-17 PROCEDURE — 93010 ELECTROCARDIOGRAM REPORT: CPT | Performed by: INTERNAL MEDICINE

## 2024-04-17 PROCEDURE — 97530 THERAPEUTIC ACTIVITIES: CPT

## 2024-04-17 PROCEDURE — 6370000000 HC RX 637 (ALT 250 FOR IP): Performed by: NURSE PRACTITIONER

## 2024-04-17 PROCEDURE — 6360000002 HC RX W HCPCS

## 2024-04-17 PROCEDURE — 2000000000 HC ICU R&B

## 2024-04-17 PROCEDURE — 94761 N-INVAS EAR/PLS OXIMETRY MLT: CPT

## 2024-04-17 PROCEDURE — 97162 PT EVAL MOD COMPLEX 30 MIN: CPT

## 2024-04-17 PROCEDURE — 2700000000 HC OXYGEN THERAPY PER DAY

## 2024-04-17 PROCEDURE — 97166 OT EVAL MOD COMPLEX 45 MIN: CPT

## 2024-04-17 PROCEDURE — 83735 ASSAY OF MAGNESIUM: CPT

## 2024-04-17 PROCEDURE — 80053 COMPREHEN METABOLIC PANEL: CPT

## 2024-04-17 PROCEDURE — 99291 CRITICAL CARE FIRST HOUR: CPT | Performed by: INTERNAL MEDICINE

## 2024-04-17 PROCEDURE — 36415 COLL VENOUS BLD VENIPUNCTURE: CPT

## 2024-04-17 PROCEDURE — 6370000000 HC RX 637 (ALT 250 FOR IP): Performed by: SURGERY

## 2024-04-17 PROCEDURE — 99232 SBSQ HOSP IP/OBS MODERATE 35: CPT

## 2024-04-17 PROCEDURE — 97535 SELF CARE MNGMENT TRAINING: CPT

## 2024-04-17 PROCEDURE — 85027 COMPLETE CBC AUTOMATED: CPT

## 2024-04-17 PROCEDURE — 6360000002 HC RX W HCPCS: Performed by: SURGERY

## 2024-04-17 RX ORDER — FUROSEMIDE 10 MG/ML
20 INJECTION INTRAMUSCULAR; INTRAVENOUS ONCE
Status: COMPLETED | OUTPATIENT
Start: 2024-04-17 | End: 2024-04-17

## 2024-04-17 RX ADMIN — FUROSEMIDE 20 MG: 20 TABLET ORAL at 08:20

## 2024-04-17 RX ADMIN — SACUBITRIL AND VALSARTAN 1.5 TABLET: 24; 26 TABLET, FILM COATED ORAL at 08:22

## 2024-04-17 RX ADMIN — MUPIROCIN: 20 OINTMENT TOPICAL at 08:21

## 2024-04-17 RX ADMIN — MUPIROCIN: 20 OINTMENT TOPICAL at 20:28

## 2024-04-17 RX ADMIN — OXYCODONE HYDROCHLORIDE 10 MG: 5 TABLET ORAL at 13:38

## 2024-04-17 RX ADMIN — MEXILETINE HYDROCHLORIDE 200 MG: 200 CAPSULE ORAL at 08:21

## 2024-04-17 RX ADMIN — MORPHINE SULFATE 2 MG: 2 INJECTION, SOLUTION INTRAMUSCULAR; INTRAVENOUS at 03:31

## 2024-04-17 RX ADMIN — OXYCODONE HYDROCHLORIDE 10 MG: 5 TABLET ORAL at 08:58

## 2024-04-17 RX ADMIN — OXYCODONE HYDROCHLORIDE 5 MG: 5 TABLET ORAL at 20:27

## 2024-04-17 RX ADMIN — Medication 10 ML: at 08:23

## 2024-04-17 RX ADMIN — MEXILETINE HYDROCHLORIDE 200 MG: 200 CAPSULE ORAL at 20:27

## 2024-04-17 RX ADMIN — FUROSEMIDE 20 MG: 10 INJECTION, SOLUTION INTRAMUSCULAR; INTRAVENOUS at 11:16

## 2024-04-17 RX ADMIN — OXYCODONE HYDROCHLORIDE 10 MG: 5 TABLET ORAL at 03:29

## 2024-04-17 RX ADMIN — PANTOPRAZOLE SODIUM 40 MG: 40 TABLET, DELAYED RELEASE ORAL at 08:22

## 2024-04-17 RX ADMIN — AMIODARONE HYDROCHLORIDE 200 MG: 200 TABLET ORAL at 08:19

## 2024-04-17 RX ADMIN — SACUBITRIL AND VALSARTAN 1.5 TABLET: 24; 26 TABLET, FILM COATED ORAL at 23:20

## 2024-04-17 RX ADMIN — Medication 10 ML: at 20:28

## 2024-04-17 RX ADMIN — METOPROLOL SUCCINATE 100 MG: 50 TABLET, EXTENDED RELEASE ORAL at 08:20

## 2024-04-17 RX ADMIN — Medication 10 ML: at 23:23

## 2024-04-17 ASSESSMENT — PAIN DESCRIPTION - ORIENTATION
ORIENTATION: RIGHT;UPPER
ORIENTATION: RIGHT;UPPER
ORIENTATION: RIGHT;LEFT

## 2024-04-17 ASSESSMENT — PAIN SCALES - GENERAL
PAINLEVEL_OUTOF10: 6
PAINLEVEL_OUTOF10: 7
PAINLEVEL_OUTOF10: 5
PAINLEVEL_OUTOF10: 4
PAINLEVEL_OUTOF10: 5
PAINLEVEL_OUTOF10: 7
PAINLEVEL_OUTOF10: 7
PAINLEVEL_OUTOF10: 5
PAINLEVEL_OUTOF10: 5
PAINLEVEL_OUTOF10: 8

## 2024-04-17 ASSESSMENT — PAIN DESCRIPTION - PAIN TYPE: TYPE: SURGICAL PAIN

## 2024-04-17 ASSESSMENT — PAIN DESCRIPTION - DESCRIPTORS
DESCRIPTORS: ACHING
DESCRIPTORS: ACHING;SHARP
DESCRIPTORS: ACHING

## 2024-04-17 ASSESSMENT — PAIN DESCRIPTION - LOCATION
LOCATION: BACK
LOCATION: INCISION;LEG
LOCATION: BACK;LEG
LOCATION: INCISION;LEG
LOCATION: INCISION;LEG
LOCATION: BACK;LEG
LOCATION: BACK
LOCATION: BACK;LEG
LOCATION: INCISION;GROIN
LOCATION: GROIN;INCISION

## 2024-04-17 NOTE — CARE COORDINATION
Case Management Assessment  Initial Evaluation    Date/Time of Evaluation: 4/17/2024 11:19 AM  Assessment Completed by: Hermelinda Ulrich RN    If patient is discharged prior to next notation, then this note serves as note for discharge by case management.    Patient Name: Alex Isaac                   YOB: 1985  Diagnosis: Ventricular tachycardia, unspecified [I47.20]  Status post ablation of ventricular arrhythmia [Z98.890, Z86.79]  Ventricular tachycardia (HCC) [I47.20]                   Date / Time: 4/16/2024  5:47 AM    Patient Admission Status: Inpatient   Readmission Risk (Low < 19, Mod (19-27), High > 27): Readmission Risk Score: 16.9    Current PCP: Amadou Rodriguez MD  PCP verified by CM? Yes    Chart Reviewed: Yes      History Provided by: Patient  Patient Orientation: Alert and Oriented, Person, Place, Situation    Patient Cognition: Alert    Hospitalization in the last 30 days (Readmission):  No    If yes, Readmission Assessment in CM Navigator will be completed.    Advance Directives:    Code Status: Full Code   Patient's Primary Decision Maker is: Legal Next of Kin (Pt prefers niece over son, will have ask spiritual care to address ACP)    Primary Decision Maker: SUMI KULKARNI - Niece/Nephew - [phone]    Secondary Decision Maker: anurag isaac - Child - [phone]    Discharge Planning:  Patient lives with: Family Members Type of Home: House (address is apt in [place], pt actually stays with niece in [place])  Primary Care Giver: Self  Patient Support Systems include: Family Members   Current Financial resources: Medicaid  Current community resources: None  Current services prior to admission: None            Current DME: None             Type of Home Care services:  None    ADLS  Prior functional level: Independent in ADLs/IADLs  Current functional level: Assistance with the following:, Mobility, Bathing, Toileting (has not gotten out of bed postop yet)    PT/OT worked with

## 2024-04-17 NOTE — OP NOTE
[address] [address]                            OPERATIVE REPORT      PATIENT NAME: ELROY VARNER                 : 1985  MED REC NO: [id number]                      ROOM: 0223  ACCOUNT NO: [id number]                       ADMIT DATE: 2024  PROVIDER: Sergio Sanders MD      DATE OF PROCEDURE:  2024    SURGEON:  Sergio Sanders MD    PREOPERATIVE DIAGNOSES:    1. Retroperitoneal hematoma.  2. External iliac artery injury.    POSTOPERATIVE DIAGNOSES:    1. Retroperitoneal hematoma.  2. External iliac artery injury.    PROCEDURE PERFORMED:  Right groin and retroperitoneal exploration with primary repair of right external iliac artery.    ANESTHESIA:  General endotracheal.    INDICATIONS:  The patient is a 38-year-old male who had undergone a ventricular fibrillation ablation procedure.  During that procedure, 9-Amharic femoral arterial access was obtained.  Postprocedure, the sheath was pulled and the patient experienced significant groin, right flank, and scrotal pain.  A CT scan was performed without contrast, showing a large retroperitoneal hematoma.  There was no evidence of significant groin hematoma at the femoral vessels.  The patient subsequently had a significant drop in his blood pressure.  He was returned to the CT scanner where the scan was repeated this time with IV contrast, showing active extravasation from the distal external iliac artery.  The patient was brought emergently to the operating room for exploration and repair.    DESCRIPTION OF PROCEDURE:  The patient was brought to the operating room, placed in supine position.  General endotracheal anesthesia induced.  After adequate anesthesia, the abdomen and pelvic regions were prepped and draped in a sterile fashion.  A transverse incision was made high in the right groin above the needlestick marks.  I dissected down through the subcutaneous tissues.

## 2024-04-17 NOTE — ANESTHESIA POSTPROCEDURE EVALUATION
1.26 (H)            02/05/2024 11:53 AM        APTT                     28.5                07/31/2023 12:26 PM     Intake & Output:  @24HRIO@    Nausea & Vomiting:  No    Level of Consciousness:  Awake    Pain Assessment:  Adequate analgesia    Anesthesia Complications:  No apparent anesthetic complications    SUMMARY      Vital signs stable  OK to discharge from Stage I post anesthesia care.  Care transferred from Anesthesiology department on discharge from perioperative area         No notable events documented.

## 2024-04-18 LAB
ANION GAP SERPL CALCULATED.3IONS-SCNC: 8 MMOL/L (ref 3–16)
BUN SERPL-MCNC: 20 MG/DL (ref 7–20)
CALCIUM SERPL-MCNC: 8.3 MG/DL (ref 8.3–10.6)
CHLORIDE SERPL-SCNC: 103 MMOL/L (ref 99–110)
CO2 SERPL-SCNC: 26 MMOL/L (ref 21–32)
CREAT SERPL-MCNC: 0.9 MG/DL (ref 0.9–1.3)
DEPRECATED RDW RBC AUTO: 14.3 % (ref 12.4–15.4)
GFR SERPLBLD CREATININE-BSD FMLA CKD-EPI: >90 ML/MIN/{1.73_M2}
GLUCOSE SERPL-MCNC: 101 MG/DL (ref 70–99)
HCT VFR BLD AUTO: 29.2 % (ref 40.5–52.5)
HGB BLD-MCNC: 9.9 G/DL (ref 13.5–17.5)
MAGNESIUM SERPL-MCNC: 2 MG/DL (ref 1.8–2.4)
MCH RBC QN AUTO: 30 PG (ref 26–34)
MCHC RBC AUTO-ENTMCNC: 34.1 G/DL (ref 31–36)
MCV RBC AUTO: 88 FL (ref 80–100)
PLATELET # BLD AUTO: 143 K/UL (ref 135–450)
PMV BLD AUTO: 8.5 FL (ref 5–10.5)
POTASSIUM SERPL-SCNC: 3.9 MMOL/L (ref 3.5–5.1)
RBC # BLD AUTO: 3.31 M/UL (ref 4.2–5.9)
SODIUM SERPL-SCNC: 137 MMOL/L (ref 136–145)
WBC # BLD AUTO: 10.5 K/UL (ref 4–11)

## 2024-04-18 PROCEDURE — 6360000002 HC RX W HCPCS: Performed by: SURGERY

## 2024-04-18 PROCEDURE — 80048 BASIC METABOLIC PNL TOTAL CA: CPT

## 2024-04-18 PROCEDURE — 83735 ASSAY OF MAGNESIUM: CPT

## 2024-04-18 PROCEDURE — P9047 ALBUMIN (HUMAN), 25%, 50ML: HCPCS | Performed by: NURSE PRACTITIONER

## 2024-04-18 PROCEDURE — 6360000002 HC RX W HCPCS: Performed by: NURSE PRACTITIONER

## 2024-04-18 PROCEDURE — 97110 THERAPEUTIC EXERCISES: CPT

## 2024-04-18 PROCEDURE — 97530 THERAPEUTIC ACTIVITIES: CPT

## 2024-04-18 PROCEDURE — 2580000003 HC RX 258: Performed by: SURGERY

## 2024-04-18 PROCEDURE — 6370000000 HC RX 637 (ALT 250 FOR IP): Performed by: SURGERY

## 2024-04-18 PROCEDURE — 99232 SBSQ HOSP IP/OBS MODERATE 35: CPT

## 2024-04-18 PROCEDURE — APPNB15 APP NON BILLABLE TIME 0-15 MINS: Performed by: CLINICAL NURSE SPECIALIST

## 2024-04-18 PROCEDURE — 36415 COLL VENOUS BLD VENIPUNCTURE: CPT

## 2024-04-18 PROCEDURE — 97535 SELF CARE MNGMENT TRAINING: CPT

## 2024-04-18 PROCEDURE — 85027 COMPLETE CBC AUTOMATED: CPT

## 2024-04-18 PROCEDURE — 2000000000 HC ICU R&B

## 2024-04-18 PROCEDURE — 6370000000 HC RX 637 (ALT 250 FOR IP)

## 2024-04-18 PROCEDURE — 99291 CRITICAL CARE FIRST HOUR: CPT | Performed by: INTERNAL MEDICINE

## 2024-04-18 RX ORDER — ALBUMIN (HUMAN) 12.5 G/50ML
25 SOLUTION INTRAVENOUS EVERY 6 HOURS
Status: DISCONTINUED | OUTPATIENT
Start: 2024-04-18 | End: 2024-04-19

## 2024-04-18 RX ORDER — FUROSEMIDE 10 MG/ML
20 INJECTION INTRAMUSCULAR; INTRAVENOUS DAILY
Status: DISCONTINUED | OUTPATIENT
Start: 2024-04-18 | End: 2024-04-21

## 2024-04-18 RX ADMIN — MUPIROCIN: 20 OINTMENT TOPICAL at 08:56

## 2024-04-18 RX ADMIN — Medication 10 ML: at 20:59

## 2024-04-18 RX ADMIN — OXYCODONE HYDROCHLORIDE 10 MG: 5 TABLET ORAL at 20:56

## 2024-04-18 RX ADMIN — APIXABAN 5 MG: 5 TABLET, FILM COATED ORAL at 20:56

## 2024-04-18 RX ADMIN — AMIODARONE HYDROCHLORIDE 200 MG: 200 TABLET ORAL at 10:50

## 2024-04-18 RX ADMIN — MEXILETINE HYDROCHLORIDE 200 MG: 200 CAPSULE ORAL at 20:56

## 2024-04-18 RX ADMIN — OXYCODONE HYDROCHLORIDE 10 MG: 5 TABLET ORAL at 01:37

## 2024-04-18 RX ADMIN — Medication 10 ML: at 08:55

## 2024-04-18 RX ADMIN — ALBUMIN (HUMAN) 25 G: 0.25 INJECTION, SOLUTION INTRAVENOUS at 08:55

## 2024-04-18 RX ADMIN — FUROSEMIDE 20 MG: 10 INJECTION, SOLUTION INTRAMUSCULAR; INTRAVENOUS at 11:39

## 2024-04-18 RX ADMIN — MORPHINE SULFATE 4 MG: 4 INJECTION, SOLUTION INTRAMUSCULAR; INTRAVENOUS at 10:11

## 2024-04-18 RX ADMIN — PANTOPRAZOLE SODIUM 40 MG: 40 TABLET, DELAYED RELEASE ORAL at 06:26

## 2024-04-18 RX ADMIN — OXYCODONE HYDROCHLORIDE 10 MG: 5 TABLET ORAL at 06:26

## 2024-04-18 RX ADMIN — ALBUMIN (HUMAN) 25 G: 0.25 INJECTION, SOLUTION INTRAVENOUS at 17:16

## 2024-04-18 RX ADMIN — ALBUMIN (HUMAN) 25 G: 0.25 INJECTION, SOLUTION INTRAVENOUS at 21:12

## 2024-04-18 RX ADMIN — MUPIROCIN: 20 OINTMENT TOPICAL at 20:59

## 2024-04-18 RX ADMIN — OXYCODONE HYDROCHLORIDE 10 MG: 5 TABLET ORAL at 15:55

## 2024-04-18 RX ADMIN — MEXILETINE HYDROCHLORIDE 200 MG: 200 CAPSULE ORAL at 08:56

## 2024-04-18 RX ADMIN — APIXABAN 5 MG: 5 TABLET, FILM COATED ORAL at 10:49

## 2024-04-18 ASSESSMENT — PAIN SCALES - GENERAL
PAINLEVEL_OUTOF10: 8
PAINLEVEL_OUTOF10: 8
PAINLEVEL_OUTOF10: 7
PAINLEVEL_OUTOF10: 8
PAINLEVEL_OUTOF10: 3
PAINLEVEL_OUTOF10: 9
PAINLEVEL_OUTOF10: 7
PAINLEVEL_OUTOF10: 2
PAINLEVEL_OUTOF10: 5

## 2024-04-18 ASSESSMENT — PAIN DESCRIPTION - LOCATION
LOCATION: GROIN;INCISION
LOCATION: LEG;GENERALIZED
LOCATION: GENERALIZED
LOCATION: LEG;BACK
LOCATION: GROIN
LOCATION: INCISION;GROIN
LOCATION: GENERALIZED
LOCATION: GROIN
LOCATION: BACK;LEG

## 2024-04-18 ASSESSMENT — PAIN DESCRIPTION - ORIENTATION
ORIENTATION: RIGHT
ORIENTATION: RIGHT
ORIENTATION: UPPER;RIGHT
ORIENTATION: OUTER;RIGHT

## 2024-04-18 ASSESSMENT — PAIN SCALES - WONG BAKER
WONGBAKER_NUMERICALRESPONSE: HURTS LITTLE MORE

## 2024-04-18 ASSESSMENT — PAIN DESCRIPTION - PAIN TYPE: TYPE: SURGICAL PAIN

## 2024-04-18 ASSESSMENT — PAIN DESCRIPTION - DESCRIPTORS
DESCRIPTORS: ACHING;DISCOMFORT
DESCRIPTORS: ACHING;DISCOMFORT

## 2024-04-18 ASSESSMENT — PAIN - FUNCTIONAL ASSESSMENT
PAIN_FUNCTIONAL_ASSESSMENT: PREVENTS OR INTERFERES SOME ACTIVE ACTIVITIES AND ADLS
PAIN_FUNCTIONAL_ASSESSMENT: ACTIVITIES ARE NOT PREVENTED

## 2024-04-18 ASSESSMENT — PAIN DESCRIPTION - ONSET: ONSET: ON-GOING

## 2024-04-18 ASSESSMENT — PAIN DESCRIPTION - FREQUENCY: FREQUENCY: CONTINUOUS

## 2024-04-18 NOTE — PLAN OF CARE
Problem: Discharge Planning  Goal: Discharge to home or other facility with appropriate resources  4/18/2024 1032 by Ortiz Campoverde RN  Outcome: Progressing  4/17/2024 2137 by Gallo Sanches RN  Outcome: Progressing  Flowsheets (Taken 4/17/2024 2000)  Discharge to home or other facility with appropriate resources: Refer to discharge planning if patient needs post-hospital services based on physician order or complex needs related to functional status, cognitive ability or social support system     Problem: Pain  Goal: Verbalizes/displays adequate comfort level or baseline comfort level  4/18/2024 1032 by Ortiz Campoverde RN  Outcome: Progressing  4/17/2024 2137 by Gallo Sanches RN  Outcome: Progressing     Problem: Skin/Tissue Integrity  Goal: Absence of new skin breakdown  Description: 1.  Monitor for areas of redness and/or skin breakdown  2.  Assess vascular access sites hourly  3.  Every 4-6 hours minimum:  Change oxygen saturation probe site  4.  Every 4-6 hours:  If on nasal continuous positive airway pressure, respiratory therapy assess nares and determine need for appliance change or resting period.  4/18/2024 1032 by Ortiz Campoverde RN  Outcome: Progressing  4/17/2024 2137 by Gallo Sanches RN  Outcome: Progressing     Problem: Safety - Adult  Goal: Free from fall injury  4/18/2024 1032 by Ortiz Campoverde RN  Outcome: Progressing  4/17/2024 2137 by Gallo Sanches RN  Outcome: Progressing     Problem: ABCDS Injury Assessment  Goal: Absence of physical injury  4/18/2024 1032 by Ortiz Campoverde RN  Outcome: Progressing  4/17/2024 2137 by Gallo Sanches, RN  Outcome: Progressing

## 2024-04-18 NOTE — CARE COORDINATION
[facility]   After review, this patient is felt to be:   Met with patient to provide information for ARU and he politely declined and wants to d/c home. Updated CM   Thank you for the referral.Chandrika Tello RN

## 2024-04-18 NOTE — PLAN OF CARE
Problem: ABCDS Injury Assessment  Goal: Absence of physical injury  4/18/2024 1925 by Latoya Pizarro, RN  Outcome: Progressing  4/18/2024 1032 by Ortiz Campoverde, RN  Outcome: Progressing

## 2024-04-18 NOTE — PLAN OF CARE
Problem: Discharge Planning  Goal: Discharge to home or other facility with appropriate resources  4/17/2024 2137 by Gallo Sanches RN  Outcome: Progressing  Flowsheets (Taken 4/17/2024 2000)  Discharge to home or other facility with appropriate resources: Refer to discharge planning if patient needs post-hospital services based on physician order or complex needs related to functional status, cognitive ability or social support system  4/17/2024 1043 by Latoya Pizarro RN  Outcome: Progressing     Problem: Pain  Goal: Verbalizes/displays adequate comfort level or baseline comfort level  Outcome: Progressing     Problem: Skin/Tissue Integrity  Goal: Absence of new skin breakdown  Description: 1.  Monitor for areas of redness and/or skin breakdown  2.  Assess vascular access sites hourly  3.  Every 4-6 hours minimum:  Change oxygen saturation probe site  4.  Every 4-6 hours:  If on nasal continuous positive airway pressure, respiratory therapy assess nares and determine need for appliance change or resting period.  4/17/2024 2137 by Gallo Sanches RN  Outcome: Progressing  4/17/2024 1043 by Latoya Pizarro RN  Outcome: Progressing     Problem: Safety - Adult  Goal: Free from fall injury  4/17/2024 2137 by Gallo Sanches RN  Outcome: Progressing  4/17/2024 1043 by Latoya Pizarro RN  Outcome: Progressing     Problem: ABCDS Injury Assessment  Goal: Absence of physical injury  4/17/2024 2137 by Gallo Sanches RN  Outcome: Progressing  4/17/2024 1043 by Latoya Pizarro, RN  Outcome: Progressing

## 2024-04-19 LAB
ANION GAP SERPL CALCULATED.3IONS-SCNC: 12 MMOL/L (ref 3–16)
BUN SERPL-MCNC: 21 MG/DL (ref 7–20)
CALCIUM SERPL-MCNC: 8.9 MG/DL (ref 8.3–10.6)
CHLORIDE SERPL-SCNC: 101 MMOL/L (ref 99–110)
CO2 SERPL-SCNC: 25 MMOL/L (ref 21–32)
CREAT SERPL-MCNC: 1 MG/DL (ref 0.9–1.3)
DEPRECATED RDW RBC AUTO: 14 % (ref 12.4–15.4)
GFR SERPLBLD CREATININE-BSD FMLA CKD-EPI: >90 ML/MIN/{1.73_M2}
GLUCOSE SERPL-MCNC: 124 MG/DL (ref 70–99)
HCT VFR BLD AUTO: 26.5 % (ref 40.5–52.5)
HGB BLD-MCNC: 9.1 G/DL (ref 13.5–17.5)
MAGNESIUM SERPL-MCNC: 2 MG/DL (ref 1.8–2.4)
MCH RBC QN AUTO: 30.3 PG (ref 26–34)
MCHC RBC AUTO-ENTMCNC: 34.2 G/DL (ref 31–36)
MCV RBC AUTO: 88.7 FL (ref 80–100)
PLATELET # BLD AUTO: 130 K/UL (ref 135–450)
PMV BLD AUTO: 8.3 FL (ref 5–10.5)
POTASSIUM SERPL-SCNC: 3.9 MMOL/L (ref 3.5–5.1)
RBC # BLD AUTO: 2.99 M/UL (ref 4.2–5.9)
SODIUM SERPL-SCNC: 138 MMOL/L (ref 136–145)
WBC # BLD AUTO: 7.7 K/UL (ref 4–11)

## 2024-04-19 PROCEDURE — 97530 THERAPEUTIC ACTIVITIES: CPT

## 2024-04-19 PROCEDURE — APPNB15 APP NON BILLABLE TIME 0-15 MINS: Performed by: CLINICAL NURSE SPECIALIST

## 2024-04-19 PROCEDURE — 2580000003 HC RX 258: Performed by: SURGERY

## 2024-04-19 PROCEDURE — 6370000000 HC RX 637 (ALT 250 FOR IP)

## 2024-04-19 PROCEDURE — P9047 ALBUMIN (HUMAN), 25%, 50ML: HCPCS | Performed by: NURSE PRACTITIONER

## 2024-04-19 PROCEDURE — 99232 SBSQ HOSP IP/OBS MODERATE 35: CPT

## 2024-04-19 PROCEDURE — 6370000000 HC RX 637 (ALT 250 FOR IP): Performed by: INTERNAL MEDICINE

## 2024-04-19 PROCEDURE — 6370000000 HC RX 637 (ALT 250 FOR IP): Performed by: SURGERY

## 2024-04-19 PROCEDURE — 2000000000 HC ICU R&B

## 2024-04-19 PROCEDURE — 97535 SELF CARE MNGMENT TRAINING: CPT

## 2024-04-19 PROCEDURE — 6370000000 HC RX 637 (ALT 250 FOR IP): Performed by: CLINICAL NURSE SPECIALIST

## 2024-04-19 PROCEDURE — 6360000002 HC RX W HCPCS: Performed by: SURGERY

## 2024-04-19 PROCEDURE — 80048 BASIC METABOLIC PNL TOTAL CA: CPT

## 2024-04-19 PROCEDURE — 83735 ASSAY OF MAGNESIUM: CPT

## 2024-04-19 PROCEDURE — 36415 COLL VENOUS BLD VENIPUNCTURE: CPT

## 2024-04-19 PROCEDURE — 6360000002 HC RX W HCPCS: Performed by: NURSE PRACTITIONER

## 2024-04-19 PROCEDURE — 85027 COMPLETE CBC AUTOMATED: CPT

## 2024-04-19 RX ORDER — FERROUS SULFATE 325(65) MG
325 TABLET ORAL 2 TIMES DAILY WITH MEALS
Status: DISCONTINUED | OUTPATIENT
Start: 2024-04-19 | End: 2024-04-21

## 2024-04-19 RX ORDER — POTASSIUM CHLORIDE 20 MEQ/1
20 TABLET, EXTENDED RELEASE ORAL ONCE
Status: COMPLETED | OUTPATIENT
Start: 2024-04-19 | End: 2024-04-19

## 2024-04-19 RX ADMIN — MEXILETINE HYDROCHLORIDE 200 MG: 200 CAPSULE ORAL at 20:08

## 2024-04-19 RX ADMIN — OXYCODONE HYDROCHLORIDE 10 MG: 5 TABLET ORAL at 00:57

## 2024-04-19 RX ADMIN — ALBUMIN (HUMAN) 25 G: 0.25 INJECTION, SOLUTION INTRAVENOUS at 04:53

## 2024-04-19 RX ADMIN — POTASSIUM CHLORIDE 20 MEQ: 1500 TABLET, EXTENDED RELEASE ORAL at 16:59

## 2024-04-19 RX ADMIN — MEXILETINE HYDROCHLORIDE 200 MG: 200 CAPSULE ORAL at 07:59

## 2024-04-19 RX ADMIN — MORPHINE SULFATE 4 MG: 4 INJECTION, SOLUTION INTRAMUSCULAR; INTRAVENOUS at 02:10

## 2024-04-19 RX ADMIN — APIXABAN 5 MG: 5 TABLET, FILM COATED ORAL at 08:53

## 2024-04-19 RX ADMIN — OXYCODONE HYDROCHLORIDE 10 MG: 5 TABLET ORAL at 22:36

## 2024-04-19 RX ADMIN — MUPIROCIN: 20 OINTMENT TOPICAL at 07:55

## 2024-04-19 RX ADMIN — OXYCODONE HYDROCHLORIDE 10 MG: 5 TABLET ORAL at 10:14

## 2024-04-19 RX ADMIN — APIXABAN 5 MG: 5 TABLET, FILM COATED ORAL at 20:08

## 2024-04-19 RX ADMIN — Medication 10 ML: at 20:09

## 2024-04-19 RX ADMIN — PANTOPRAZOLE SODIUM 40 MG: 40 TABLET, DELAYED RELEASE ORAL at 06:12

## 2024-04-19 RX ADMIN — OXYCODONE HYDROCHLORIDE 10 MG: 5 TABLET ORAL at 17:54

## 2024-04-19 RX ADMIN — Medication 10 ML: at 07:56

## 2024-04-19 RX ADMIN — AMIODARONE HYDROCHLORIDE 200 MG: 200 TABLET ORAL at 07:54

## 2024-04-19 RX ADMIN — ALBUMIN (HUMAN) 25 G: 0.25 INJECTION, SOLUTION INTRAVENOUS at 08:09

## 2024-04-19 RX ADMIN — POLYETHYLENE GLYCOL 3350 17 G: 17 POWDER, FOR SOLUTION ORAL at 05:02

## 2024-04-19 RX ADMIN — FUROSEMIDE 20 MG: 10 INJECTION, SOLUTION INTRAMUSCULAR; INTRAVENOUS at 07:55

## 2024-04-19 RX ADMIN — MUPIROCIN: 20 OINTMENT TOPICAL at 20:09

## 2024-04-19 RX ADMIN — OXYCODONE HYDROCHLORIDE 10 MG: 5 TABLET ORAL at 04:48

## 2024-04-19 RX ADMIN — FERROUS SULFATE TAB 325 MG (65 MG ELEMENTAL FE) 325 MG: 325 (65 FE) TAB at 07:53

## 2024-04-19 RX ADMIN — ACETAMINOPHEN 650 MG: 325 TABLET ORAL at 07:53

## 2024-04-19 RX ADMIN — FERROUS SULFATE TAB 325 MG (65 MG ELEMENTAL FE) 325 MG: 325 (65 FE) TAB at 17:00

## 2024-04-19 ASSESSMENT — PAIN SCALES - GENERAL
PAINLEVEL_OUTOF10: 0
PAINLEVEL_OUTOF10: 8
PAINLEVEL_OUTOF10: 7
PAINLEVEL_OUTOF10: 10
PAINLEVEL_OUTOF10: 8
PAINLEVEL_OUTOF10: 10
PAINLEVEL_OUTOF10: 10

## 2024-04-19 ASSESSMENT — PAIN DESCRIPTION - LOCATION
LOCATION: BACK;SCROTUM
LOCATION: BACK;ABDOMEN
LOCATION: SCROTUM;GENERALIZED
LOCATION: ABDOMEN;BACK
LOCATION: ABDOMEN;GENERALIZED
LOCATION: ABDOMEN;BACK

## 2024-04-19 ASSESSMENT — PAIN DESCRIPTION - ORIENTATION
ORIENTATION: RIGHT
ORIENTATION: POSTERIOR
ORIENTATION: RIGHT

## 2024-04-19 ASSESSMENT — PAIN DESCRIPTION - DESCRIPTORS
DESCRIPTORS: ACHING
DESCRIPTORS: ACHING

## 2024-04-19 ASSESSMENT — PAIN - FUNCTIONAL ASSESSMENT: PAIN_FUNCTIONAL_ASSESSMENT: PREVENTS OR INTERFERES SOME ACTIVE ACTIVITIES AND ADLS

## 2024-04-19 NOTE — CARE COORDINATION
Pt is voiding , BP still soft.  Monitoring H/H has dropped. He is refusing to go to SNF, he wants to stay at his nieces;. Called her to verify that is okay. Pt will need Nsg/pt/ot  at home. [facility] is following.     Her address is :  [address] [place] [address] [place] [address]        The pt also asked about  low income housing. I gave him a folder with multiple resources for low income housing and homeless shelters.

## 2024-04-19 NOTE — CONSULTS
Vascular Surgery Consultation    Date of Admission:  4/16/2024  5:47 AM  Date of Consultation:  4/16/2024    PCP:  Amadou Rodriguez MD       Reason Consult: Retroperitoneal hematoma with hypotension    History of Present Illness:   We are asked to see this patient in consultation by Dr. Mckeon  regarding the above.   Alex Isaac is a 38 y.o. male who underwent V Fib ablation earlier today.  R femoral artery accessed with 9Fr sheath.  Post sheath pull c/o severe pain in groin, lower abdomen, and scrotum.    Hgb drop from 15 to 12.  Initial CT without IV contrast showed large RP hematoma and scrotal hematoma.  Patient became hypotensive and returned to repeat CT with IV contrast.         Past Medical History:  Past Medical History:   Diagnosis Date    Muscular dystrophy (HCC)        Past Surgical History:  Past Surgical History:   Procedure Laterality Date    PACEMAKER INSERTION         Home Medications:   Prior to Admission medications    Medication Sig Start Date End Date Taking? Authorizing Provider   furosemide (LASIX) 20 MG tablet Take 1 tablet by mouth daily 3/28/24   Reyna Thao APRN - CNP   pantoprazole (PROTONIX) 40 MG tablet Take 1 tablet by mouth every morning (before breakfast) 3/28/24   Reyna Thao APRN - CNP   sacubitril-valsartan (ENTRESTO) 24-26 MG per tablet Take 1.5 tablets by mouth 2 times daily 2/23/24   Reyna Thao APRN - CNP   amiodarone (CORDARONE) 200 MG tablet Take 1 tablet by mouth daily 2/15/24 5/15/24  Elisa Martinez APRN - CNP   mexiletine (MEXITIL) 200 MG capsule Take 1 capsule by mouth 2 times daily 2/8/24   Elisa Martinez APRN - CNP   metoprolol succinate (TOPROL XL) 100 MG extended release tablet Take 1 tablet by mouth daily 1/19/24   Marta Mckeon MD   apixaban (ELIQUIS) 5 MG TABS tablet Take 1 tablet by mouth 2 times daily 12/20/23   Reyna Thao APRN - CNP        Facility Administered Medications:    sodium chloride flush  5-40 mL 
Consult Note            Date:4/17/2024        Patient Name:Alex Isaac     YOB: 1985     Age:38 y.o.    Inpatient consult to Critical Care  Consult performed by: Musa Murillo MD  Consult ordered by: Marta Mckeon MD  Reason for consult: Retroperitoneal hematoma status post VT ablation, hypotension          Chief Complaint   No chief complaint on file.          History Obtained From   patient, electronic medical record    History of Present Illness   This is a 46-year-old gentleman with a past medical history of muscular dystrophy, proximal atrial fibrillation and nonischemic cardiomyopathy the with an ejection fraction of less than 20% who came to [facility] on 4/16/2024 for EP study and VT ablation.  Patient had an AICD in place previously.  Patient was having VT episodes despite being on amiodarone.  Patient had tolerated the procedure well however after the procedure the patient started having right abdominal pain and right femoral site pain CT of the abdomen was performed and showed a large retroperitoneal hematoma at the right side extending into the right scrotal sac.  Patient had a drop of hemoglobin from 15/44 down to 12/36.  Patient had vascular surgery see the patient later that afternoon.  And had been taken to the OR.  Patient had a retroperitoneal hematoma, and an external iliac artery injury.  This was repaired by vascular surgery.  Patient was admitted to the ICU because of the need of close monitoring and hypotension.  Patient did require Levophed initially but has come off.    Past Medical History     Past Medical History:   Diagnosis Date    Muscular dystrophy (HCC)         Past Surgical History     Past Surgical History:   Procedure Laterality Date    PACEMAKER INSERTION          Medications     Prior to Admission medications    Medication Sig Start Date End Date Taking? Authorizing Provider   furosemide (LASIX) 20 MG tablet Take 1 tablet by mouth daily 3/28/24   
nausea, vomiting, diarrhea, constipation, blood in stool and abdominal pain  GENITOURINARY: negative for frequency, dysuria, urinary incontinence, decreased urine volume, and hematuria  HEMATOLOGIC/LYMPHATIC: negative for easy bruising, bleeding and lymphadenopathy  ALLERGIC/IMMUNOLOGIC: negative for recurrent infections, angioedema, anaphylaxis and drug reactions  ENDOCRINE: negative for weight changes and diabetic symptoms including polyuria, polydipsia and polyphagia  MUSCULOSKELETAL: negative for pain, joint swelling, decreased range of motion and muscle weakness  NEUROLOGICAL: negative for headaches, slurred speech, unilateral weakness  PSYCHIATRIC/BEHAVIORAL: negative for hallucinations, behavioral problems, confusion and agitation.     Physical Examination:  Vitals: Patient Vitals for the past 24 hrs:   BP Temp Temp src Pulse Resp SpO2 Weight   04/19/24 1300 (!) 100/59 -- -- 60 -- -- --   04/19/24 1200 (!) 113/99 -- -- 60 -- -- --   04/19/24 1100 109/79 -- -- 60 -- -- --   04/19/24 0939 101/63 -- -- 60 -- 97 % --   04/19/24 0800 96/83 -- -- 60 -- -- --   04/19/24 0753 -- -- -- 64 -- -- --   04/19/24 0700 (!) 103/57 98.4 °F (36.9 °C) Oral 60 -- -- --   04/19/24 0600 (!) 96/55 -- -- 60 -- -- --   04/19/24 0518 -- -- -- -- 16 -- --   04/19/24 0500 (!) 117/96 -- -- 60 -- -- --   04/19/24 0400 93/63 -- -- 62 -- -- 67.8 kg (149 lb 7.6 oz)   04/19/24 0240 -- -- -- -- 18 -- --   04/19/24 0205 107/63 -- -- 60 -- -- --   04/19/24 0200 (!) 78/42 -- -- 60 -- -- --   04/19/24 0127 -- -- -- -- 16 -- --   04/19/24 0100 (!) 78/51 -- -- 60 -- -- --   04/19/24 0000 (!) 78/44 -- -- 60 -- -- --   04/18/24 2300 (!) 83/51 -- -- 60 -- -- --   04/18/24 2200 (!) 90/54 -- -- 60 -- -- --   04/18/24 2126 -- -- -- -- 16 -- --   04/18/24 2100 (!) 83/56 -- -- 60 -- -- --   04/18/24 2000 (!) 82/49 98.3 °F (36.8 °C) Oral 60 16 99 % --   04/18/24 1900 (!) 83/50 -- -- 60 -- -- --   04/18/24 1800 111/71 -- -- 60 -- 99 % --   04/18/24 1625 -- --

## 2024-04-19 NOTE — PLAN OF CARE
CHF Care Plan      Patient's EF (Ejection Fraction) is less than 40%    Heart Failure Medications:  Diuretics:: Furosemide    (One of the following REQUIRED for EF </= 40%/SYSTOLIC FAILURE but MAY be used in EF% >40%/DIASTOLIC FAILURE)        ACE:: None        ARB:: None         ARNI:: Sacubitril/Valsartan-Entresto    (Beta Blockers)  NON- Evidenced Based Beta Blocker (for EF% >40%/DIASTOLIC FAILURE): None    Evidenced Based Beta Blocker::(REQUIRED for EF% <40%/SYSTOLIC FAILURE) Metoprolol SUCCinate- Toprol XL  ...................................................................................................................................................    Failed to redirect to the Timeline version of the [employer] SmartLink.      Patient's weights and intake/output reviewed    Daily Weight log at bedside, patient/family participation in use of log: \"yes    Patient's current weight today shows a difference of 12.5 lbs more than last documented weight.      Intake/Output Summary (Last 24 hours) at 4/19/2024 0726  Last data filed at 4/19/2024 0600  Gross per 24 hour   Intake 1199.59 ml   Output 1235 ml   Net -35.41 ml       Education Booklet Provided: yes    Comorbidities Reviewed Yes    Patient has a past medical history of Muscular dystrophy (HCC).     >>For CHF and Comorbidity documentation on Education Time and Topics, please see Education Tab      Pt resting in bed at this time on room air. Pt denies shortness of breath. Pt without lower extremity edema.     Patient and/or Family's stated Goal of Care this Admission: increase activity tolerance, better understand heart failure and disease management, and be more comfortable prior to discharge        :

## 2024-04-20 ENCOUNTER — APPOINTMENT (OUTPATIENT)
Dept: CT IMAGING | Age: 39
DRG: 175 | End: 2024-04-20
Attending: INTERNAL MEDICINE
Payer: MEDICAID

## 2024-04-20 LAB
ANION GAP SERPL CALCULATED.3IONS-SCNC: 9 MMOL/L (ref 3–16)
BLOOD BANK DISPENSE STATUS: NORMAL
BLOOD BANK DISPENSE STATUS: NORMAL
BLOOD BANK PRODUCT CODE: NORMAL
BLOOD BANK PRODUCT CODE: NORMAL
BPU ID: NORMAL
BPU ID: NORMAL
BUN SERPL-MCNC: 20 MG/DL (ref 7–20)
CALCIUM SERPL-MCNC: 9 MG/DL (ref 8.3–10.6)
CHLORIDE SERPL-SCNC: 100 MMOL/L (ref 99–110)
CO2 SERPL-SCNC: 25 MMOL/L (ref 21–32)
CREAT SERPL-MCNC: 0.8 MG/DL (ref 0.9–1.3)
DEPRECATED RDW RBC AUTO: 13.5 % (ref 12.4–15.4)
DESCRIPTION BLOOD BANK: NORMAL
DESCRIPTION BLOOD BANK: NORMAL
GFR SERPLBLD CREATININE-BSD FMLA CKD-EPI: >90 ML/MIN/{1.73_M2}
GLUCOSE SERPL-MCNC: 97 MG/DL (ref 70–99)
HCT VFR BLD AUTO: 24.4 % (ref 40.5–52.5)
HGB BLD-MCNC: 8.4 G/DL (ref 13.5–17.5)
MCH RBC QN AUTO: 30.2 PG (ref 26–34)
MCHC RBC AUTO-ENTMCNC: 34.3 G/DL (ref 31–36)
MCV RBC AUTO: 88.1 FL (ref 80–100)
PLATELET # BLD AUTO: 148 K/UL (ref 135–450)
PMV BLD AUTO: 8 FL (ref 5–10.5)
POTASSIUM SERPL-SCNC: 4.1 MMOL/L (ref 3.5–5.1)
RBC # BLD AUTO: 2.77 M/UL (ref 4.2–5.9)
SODIUM SERPL-SCNC: 134 MMOL/L (ref 136–145)
WBC # BLD AUTO: 7.2 K/UL (ref 4–11)

## 2024-04-20 PROCEDURE — 6360000004 HC RX CONTRAST MEDICATION: Performed by: SURGERY

## 2024-04-20 PROCEDURE — 6370000000 HC RX 637 (ALT 250 FOR IP): Performed by: INTERNAL MEDICINE

## 2024-04-20 PROCEDURE — 6370000000 HC RX 637 (ALT 250 FOR IP)

## 2024-04-20 PROCEDURE — 99232 SBSQ HOSP IP/OBS MODERATE 35: CPT

## 2024-04-20 PROCEDURE — 74177 CT ABD & PELVIS W/CONTRAST: CPT

## 2024-04-20 PROCEDURE — 80048 BASIC METABOLIC PNL TOTAL CA: CPT

## 2024-04-20 PROCEDURE — 36415 COLL VENOUS BLD VENIPUNCTURE: CPT

## 2024-04-20 PROCEDURE — 6370000000 HC RX 637 (ALT 250 FOR IP): Performed by: CLINICAL NURSE SPECIALIST

## 2024-04-20 PROCEDURE — 6370000000 HC RX 637 (ALT 250 FOR IP): Performed by: SURGERY

## 2024-04-20 PROCEDURE — 2000000000 HC ICU R&B

## 2024-04-20 PROCEDURE — 85027 COMPLETE CBC AUTOMATED: CPT

## 2024-04-20 PROCEDURE — 6360000002 HC RX W HCPCS: Performed by: NURSE PRACTITIONER

## 2024-04-20 PROCEDURE — 99291 CRITICAL CARE FIRST HOUR: CPT | Performed by: INTERNAL MEDICINE

## 2024-04-20 PROCEDURE — 2580000003 HC RX 258: Performed by: SURGERY

## 2024-04-20 RX ORDER — ONDANSETRON 4 MG/1
4 TABLET, ORALLY DISINTEGRATING ORAL EVERY 8 HOURS PRN
Status: DISCONTINUED | OUTPATIENT
Start: 2024-04-20 | End: 2024-04-22 | Stop reason: HOSPADM

## 2024-04-20 RX ORDER — DOCUSATE SODIUM 100 MG/1
100 CAPSULE, LIQUID FILLED ORAL 2 TIMES DAILY
Status: DISCONTINUED | OUTPATIENT
Start: 2024-04-20 | End: 2024-04-22 | Stop reason: HOSPADM

## 2024-04-20 RX ORDER — METOPROLOL SUCCINATE 25 MG/1
25 TABLET, EXTENDED RELEASE ORAL DAILY
Status: DISCONTINUED | OUTPATIENT
Start: 2024-04-20 | End: 2024-04-22 | Stop reason: HOSPADM

## 2024-04-20 RX ADMIN — Medication 10 ML: at 20:08

## 2024-04-20 RX ADMIN — APIXABAN 5 MG: 5 TABLET, FILM COATED ORAL at 20:08

## 2024-04-20 RX ADMIN — MUPIROCIN: 20 OINTMENT TOPICAL at 20:11

## 2024-04-20 RX ADMIN — MUPIROCIN: 20 OINTMENT TOPICAL at 07:51

## 2024-04-20 RX ADMIN — MEXILETINE HYDROCHLORIDE 200 MG: 200 CAPSULE ORAL at 20:08

## 2024-04-20 RX ADMIN — OXYCODONE HYDROCHLORIDE 10 MG: 5 TABLET ORAL at 12:45

## 2024-04-20 RX ADMIN — APIXABAN 5 MG: 5 TABLET, FILM COATED ORAL at 07:51

## 2024-04-20 RX ADMIN — METOPROLOL SUCCINATE 25 MG: 25 TABLET, EXTENDED RELEASE ORAL at 12:45

## 2024-04-20 RX ADMIN — AMIODARONE HYDROCHLORIDE 200 MG: 200 TABLET ORAL at 07:51

## 2024-04-20 RX ADMIN — MEXILETINE HYDROCHLORIDE 200 MG: 200 CAPSULE ORAL at 07:51

## 2024-04-20 RX ADMIN — PANTOPRAZOLE SODIUM 40 MG: 40 TABLET, DELAYED RELEASE ORAL at 07:51

## 2024-04-20 RX ADMIN — FERROUS SULFATE TAB 325 MG (65 MG ELEMENTAL FE) 325 MG: 325 (65 FE) TAB at 16:54

## 2024-04-20 RX ADMIN — ONDANSETRON 4 MG: 4 TABLET, ORALLY DISINTEGRATING ORAL at 08:32

## 2024-04-20 RX ADMIN — IOPAMIDOL 75 ML: 755 INJECTION, SOLUTION INTRAVENOUS at 09:33

## 2024-04-20 RX ADMIN — FUROSEMIDE 20 MG: 10 INJECTION, SOLUTION INTRAMUSCULAR; INTRAVENOUS at 07:51

## 2024-04-20 RX ADMIN — OXYCODONE HYDROCHLORIDE 10 MG: 5 TABLET ORAL at 07:12

## 2024-04-20 RX ADMIN — OXYCODONE HYDROCHLORIDE 10 MG: 5 TABLET ORAL at 22:44

## 2024-04-20 RX ADMIN — Medication 10 ML: at 07:51

## 2024-04-20 RX ADMIN — FERROUS SULFATE TAB 325 MG (65 MG ELEMENTAL FE) 325 MG: 325 (65 FE) TAB at 07:51

## 2024-04-20 RX ADMIN — OXYCODONE HYDROCHLORIDE 10 MG: 5 TABLET ORAL at 17:50

## 2024-04-20 RX ADMIN — OXYCODONE HYDROCHLORIDE 10 MG: 5 TABLET ORAL at 03:09

## 2024-04-20 ASSESSMENT — PAIN DESCRIPTION - LOCATION
LOCATION: GROIN;BACK;ABDOMEN
LOCATION: BACK;SCROTUM
LOCATION: SCROTUM

## 2024-04-20 ASSESSMENT — PAIN - FUNCTIONAL ASSESSMENT
PAIN_FUNCTIONAL_ASSESSMENT: PREVENTS OR INTERFERES SOME ACTIVE ACTIVITIES AND ADLS
PAIN_FUNCTIONAL_ASSESSMENT: PREVENTS OR INTERFERES SOME ACTIVE ACTIVITIES AND ADLS

## 2024-04-20 ASSESSMENT — PAIN SCALES - GENERAL
PAINLEVEL_OUTOF10: 8
PAINLEVEL_OUTOF10: 0
PAINLEVEL_OUTOF10: 4
PAINLEVEL_OUTOF10: 8
PAINLEVEL_OUTOF10: 10
PAINLEVEL_OUTOF10: 0
PAINLEVEL_OUTOF10: 4
PAINLEVEL_OUTOF10: 7

## 2024-04-20 ASSESSMENT — PAIN DESCRIPTION - DESCRIPTORS
DESCRIPTORS: ACHING
DESCRIPTORS: ACHING

## 2024-04-21 PROBLEM — E61.1 IRON DEFICIENCY: Status: ACTIVE | Noted: 2024-04-21

## 2024-04-21 PROBLEM — D62 ACUTE BLOOD LOSS ANEMIA: Status: ACTIVE | Noted: 2024-04-21

## 2024-04-21 LAB
ANION GAP SERPL CALCULATED.3IONS-SCNC: 11 MMOL/L (ref 3–16)
BUN SERPL-MCNC: 22 MG/DL (ref 7–20)
CALCIUM SERPL-MCNC: 8.9 MG/DL (ref 8.3–10.6)
CHLORIDE SERPL-SCNC: 99 MMOL/L (ref 99–110)
CO2 SERPL-SCNC: 26 MMOL/L (ref 21–32)
CREAT SERPL-MCNC: 0.8 MG/DL (ref 0.9–1.3)
DEPRECATED RDW RBC AUTO: 13.9 % (ref 12.4–15.4)
GFR SERPLBLD CREATININE-BSD FMLA CKD-EPI: >90 ML/MIN/{1.73_M2}
GLUCOSE SERPL-MCNC: 94 MG/DL (ref 70–99)
HCT VFR BLD AUTO: 25.9 % (ref 40.5–52.5)
HGB BLD-MCNC: 8.7 G/DL (ref 13.5–17.5)
MAGNESIUM SERPL-MCNC: 2.2 MG/DL (ref 1.8–2.4)
MCH RBC QN AUTO: 29.8 PG (ref 26–34)
MCHC RBC AUTO-ENTMCNC: 33.7 G/DL (ref 31–36)
MCV RBC AUTO: 88.6 FL (ref 80–100)
PLATELET # BLD AUTO: 185 K/UL (ref 135–450)
PMV BLD AUTO: 7.6 FL (ref 5–10.5)
POTASSIUM SERPL-SCNC: 4.1 MMOL/L (ref 3.5–5.1)
RBC # BLD AUTO: 2.92 M/UL (ref 4.2–5.9)
SODIUM SERPL-SCNC: 136 MMOL/L (ref 136–145)
WBC # BLD AUTO: 7.4 K/UL (ref 4–11)

## 2024-04-21 PROCEDURE — 85027 COMPLETE CBC AUTOMATED: CPT

## 2024-04-21 PROCEDURE — 99232 SBSQ HOSP IP/OBS MODERATE 35: CPT | Performed by: NURSE PRACTITIONER

## 2024-04-21 PROCEDURE — 6370000000 HC RX 637 (ALT 250 FOR IP): Performed by: NURSE PRACTITIONER

## 2024-04-21 PROCEDURE — 6360000002 HC RX W HCPCS: Performed by: NURSE PRACTITIONER

## 2024-04-21 PROCEDURE — 83735 ASSAY OF MAGNESIUM: CPT

## 2024-04-21 PROCEDURE — 80048 BASIC METABOLIC PNL TOTAL CA: CPT

## 2024-04-21 PROCEDURE — 6370000000 HC RX 637 (ALT 250 FOR IP)

## 2024-04-21 PROCEDURE — 6370000000 HC RX 637 (ALT 250 FOR IP): Performed by: SURGERY

## 2024-04-21 PROCEDURE — 36415 COLL VENOUS BLD VENIPUNCTURE: CPT

## 2024-04-21 PROCEDURE — 6370000000 HC RX 637 (ALT 250 FOR IP): Performed by: INTERNAL MEDICINE

## 2024-04-21 PROCEDURE — 2000000000 HC ICU R&B

## 2024-04-21 PROCEDURE — 2580000003 HC RX 258: Performed by: NURSE PRACTITIONER

## 2024-04-21 PROCEDURE — 99233 SBSQ HOSP IP/OBS HIGH 50: CPT | Performed by: INTERNAL MEDICINE

## 2024-04-21 PROCEDURE — 2580000003 HC RX 258: Performed by: SURGERY

## 2024-04-21 RX ORDER — FUROSEMIDE 20 MG/1
20 TABLET ORAL DAILY
Status: DISCONTINUED | OUTPATIENT
Start: 2024-04-21 | End: 2024-04-22 | Stop reason: HOSPADM

## 2024-04-21 RX ADMIN — ACETAMINOPHEN 650 MG: 325 TABLET ORAL at 08:43

## 2024-04-21 RX ADMIN — OXYCODONE HYDROCHLORIDE 10 MG: 5 TABLET ORAL at 20:53

## 2024-04-21 RX ADMIN — OXYCODONE HYDROCHLORIDE 10 MG: 5 TABLET ORAL at 05:19

## 2024-04-21 RX ADMIN — APIXABAN 5 MG: 5 TABLET, FILM COATED ORAL at 20:53

## 2024-04-21 RX ADMIN — FUROSEMIDE 20 MG: 20 TABLET ORAL at 08:44

## 2024-04-21 RX ADMIN — MUPIROCIN: 20 OINTMENT TOPICAL at 20:52

## 2024-04-21 RX ADMIN — PANTOPRAZOLE SODIUM 40 MG: 40 TABLET, DELAYED RELEASE ORAL at 05:19

## 2024-04-21 RX ADMIN — OXYCODONE HYDROCHLORIDE 10 MG: 5 TABLET ORAL at 11:10

## 2024-04-21 RX ADMIN — METOPROLOL SUCCINATE 25 MG: 25 TABLET, EXTENDED RELEASE ORAL at 08:44

## 2024-04-21 RX ADMIN — DOCUSATE SODIUM 100 MG: 100 CAPSULE, LIQUID FILLED ORAL at 20:53

## 2024-04-21 RX ADMIN — DOCUSATE SODIUM 100 MG: 100 CAPSULE, LIQUID FILLED ORAL at 08:43

## 2024-04-21 RX ADMIN — ACETAMINOPHEN 650 MG: 325 TABLET ORAL at 16:31

## 2024-04-21 RX ADMIN — SACUBITRIL AND VALSARTAN 0.5 TABLET: 24; 26 TABLET, FILM COATED ORAL at 20:53

## 2024-04-21 RX ADMIN — SACUBITRIL AND VALSARTAN 0.5 TABLET: 24; 26 TABLET, FILM COATED ORAL at 09:28

## 2024-04-21 RX ADMIN — AMIODARONE HYDROCHLORIDE 200 MG: 200 TABLET ORAL at 08:44

## 2024-04-21 RX ADMIN — Medication 10 ML: at 20:53

## 2024-04-21 RX ADMIN — OXYCODONE HYDROCHLORIDE 10 MG: 5 TABLET ORAL at 16:31

## 2024-04-21 RX ADMIN — APIXABAN 5 MG: 5 TABLET, FILM COATED ORAL at 08:44

## 2024-04-21 RX ADMIN — IRON SUCROSE 300 MG: 20 INJECTION, SOLUTION INTRAVENOUS at 09:33

## 2024-04-21 RX ADMIN — ACETAMINOPHEN 650 MG: 325 TABLET ORAL at 20:52

## 2024-04-21 RX ADMIN — Medication 10 ML: at 08:44

## 2024-04-21 RX ADMIN — MEXILETINE HYDROCHLORIDE 200 MG: 200 CAPSULE ORAL at 20:53

## 2024-04-21 RX ADMIN — MUPIROCIN: 20 OINTMENT TOPICAL at 08:44

## 2024-04-21 RX ADMIN — MEXILETINE HYDROCHLORIDE 200 MG: 200 CAPSULE ORAL at 08:43

## 2024-04-21 ASSESSMENT — PAIN DESCRIPTION - LOCATION
LOCATION: SCROTUM;HIP
LOCATION: SCROTUM;BACK
LOCATION: HIP;SCROTUM
LOCATION: HIP;SCROTUM
LOCATION: BACK
LOCATION: BACK

## 2024-04-21 ASSESSMENT — PAIN SCALES - GENERAL
PAINLEVEL_OUTOF10: 5
PAINLEVEL_OUTOF10: 7
PAINLEVEL_OUTOF10: 8
PAINLEVEL_OUTOF10: 8

## 2024-04-21 ASSESSMENT — PAIN DESCRIPTION - ORIENTATION
ORIENTATION: POSTERIOR
ORIENTATION: POSTERIOR

## 2024-04-21 ASSESSMENT — PAIN DESCRIPTION - DESCRIPTORS
DESCRIPTORS: ACHING
DESCRIPTORS: ACHING;SORE
DESCRIPTORS: ACHING;SORE

## 2024-04-21 ASSESSMENT — PAIN - FUNCTIONAL ASSESSMENT: PAIN_FUNCTIONAL_ASSESSMENT: PREVENTS OR INTERFERES SOME ACTIVE ACTIVITIES AND ADLS

## 2024-04-21 NOTE — FLOWSHEET NOTE
04/21/24 1000   Vitals   Pulse 60   BP (!) 81/54   MAP (Calculated) 63   MAP (mmHg) (!) 63   BP Location Left upper arm     Pt symptomatic with c/o lightheadedness & whooshing noise in ears. BP repeated in right upper arm 104/75 (85).

## 2024-04-21 NOTE — PLAN OF CARE
Problem: Discharge Planning  Goal: Discharge to home or other facility with appropriate resources  Recent Flowsheet Documentation  Taken 4/20/2024 2000 by Len Bolden RN  Discharge to home or other facility with appropriate resources:   Identify barriers to discharge with patient and caregiver   Arrange for needed discharge resources and transportation as appropriate   Identify discharge learning needs (meds, wound care, etc)   Refer to discharge planning if patient needs post-hospital services based on physician order or complex needs related to functional status, cognitive ability or social support system  4/20/2024 0904 by Agatha Gilmore RN  Outcome: Progressing  Flowsheets (Taken 4/19/2024 2000 by Len Bolden RN)  Discharge to home or other facility with appropriate resources:   Identify barriers to discharge with patient and caregiver   Arrange for needed discharge resources and transportation as appropriate   Identify discharge learning needs (meds, wound care, etc)   Refer to discharge planning if patient needs post-hospital services based on physician order or complex needs related to functional status, cognitive ability or social support system     Problem: Pain  Goal: Verbalizes/displays adequate comfort level or baseline comfort level  Recent Flowsheet Documentation  Taken 4/20/2024 2000 by Len Bolden RN  Verbalizes/displays adequate comfort level or baseline comfort level:   Encourage patient to monitor pain and request assistance   Assess pain using appropriate pain scale   Administer analgesics based on type and severity of pain and evaluate response   Implement non-pharmacological measures as appropriate and evaluate response   Consider cultural and social influences on pain and pain management   Notify Licensed Independent Practitioner if interventions unsuccessful or patient reports new pain  4/20/2024 0904 by Agatha Gilmore, RN  Outcome: Progressing     Problem: Skin/Tissue

## 2024-04-22 ENCOUNTER — TELEPHONE (OUTPATIENT)
Dept: PULMONOLOGY | Age: 39
End: 2024-04-22

## 2024-04-22 VITALS
SYSTOLIC BLOOD PRESSURE: 110 MMHG | HEIGHT: 65 IN | WEIGHT: 148.15 LBS | TEMPERATURE: 98.1 F | BODY MASS INDEX: 24.68 KG/M2 | DIASTOLIC BLOOD PRESSURE: 79 MMHG | RESPIRATION RATE: 16 BRPM | OXYGEN SATURATION: 97 % | HEART RATE: 60 BPM

## 2024-04-22 LAB
ANION GAP SERPL CALCULATED.3IONS-SCNC: 14 MMOL/L (ref 3–16)
BUN SERPL-MCNC: 25 MG/DL (ref 7–20)
CALCIUM SERPL-MCNC: 8.9 MG/DL (ref 8.3–10.6)
CHLORIDE SERPL-SCNC: 99 MMOL/L (ref 99–110)
CO2 SERPL-SCNC: 23 MMOL/L (ref 21–32)
CREAT SERPL-MCNC: 0.8 MG/DL (ref 0.9–1.3)
DEPRECATED RDW RBC AUTO: 13.9 % (ref 12.4–15.4)
GFR SERPLBLD CREATININE-BSD FMLA CKD-EPI: >90 ML/MIN/{1.73_M2}
GLUCOSE SERPL-MCNC: 105 MG/DL (ref 70–99)
HCT VFR BLD AUTO: 28.4 % (ref 40.5–52.5)
HGB BLD-MCNC: 9.5 G/DL (ref 13.5–17.5)
MAGNESIUM SERPL-MCNC: 2.1 MG/DL (ref 1.8–2.4)
MCH RBC QN AUTO: 29.9 PG (ref 26–34)
MCHC RBC AUTO-ENTMCNC: 33.4 G/DL (ref 31–36)
MCV RBC AUTO: 89.6 FL (ref 80–100)
PLATELET # BLD AUTO: 204 K/UL (ref 135–450)
PMV BLD AUTO: 7.7 FL (ref 5–10.5)
POTASSIUM SERPL-SCNC: 4.2 MMOL/L (ref 3.5–5.1)
RBC # BLD AUTO: 3.17 M/UL (ref 4.2–5.9)
SODIUM SERPL-SCNC: 136 MMOL/L (ref 136–145)
WBC # BLD AUTO: 9 K/UL (ref 4–11)

## 2024-04-22 PROCEDURE — 2580000003 HC RX 258: Performed by: NURSE PRACTITIONER

## 2024-04-22 PROCEDURE — 6370000000 HC RX 637 (ALT 250 FOR IP): Performed by: NURSE PRACTITIONER

## 2024-04-22 PROCEDURE — 97530 THERAPEUTIC ACTIVITIES: CPT

## 2024-04-22 PROCEDURE — 2580000003 HC RX 258: Performed by: SURGERY

## 2024-04-22 PROCEDURE — 6370000000 HC RX 637 (ALT 250 FOR IP): Performed by: INTERNAL MEDICINE

## 2024-04-22 PROCEDURE — 6370000000 HC RX 637 (ALT 250 FOR IP): Performed by: SURGERY

## 2024-04-22 PROCEDURE — 97535 SELF CARE MNGMENT TRAINING: CPT

## 2024-04-22 PROCEDURE — 99232 SBSQ HOSP IP/OBS MODERATE 35: CPT | Performed by: NURSE PRACTITIONER

## 2024-04-22 PROCEDURE — 85027 COMPLETE CBC AUTOMATED: CPT

## 2024-04-22 PROCEDURE — 80048 BASIC METABOLIC PNL TOTAL CA: CPT

## 2024-04-22 PROCEDURE — 6360000002 HC RX W HCPCS: Performed by: NURSE PRACTITIONER

## 2024-04-22 PROCEDURE — 83735 ASSAY OF MAGNESIUM: CPT

## 2024-04-22 PROCEDURE — 6370000000 HC RX 637 (ALT 250 FOR IP)

## 2024-04-22 RX ORDER — METOPROLOL SUCCINATE 25 MG/1
25 TABLET, EXTENDED RELEASE ORAL DAILY
Qty: 30 TABLET | Refills: 0 | Status: SHIPPED | OUTPATIENT
Start: 2024-04-23 | End: 2024-05-23

## 2024-04-22 RX ADMIN — Medication 10 ML: at 08:48

## 2024-04-22 RX ADMIN — OXYCODONE HYDROCHLORIDE 10 MG: 5 TABLET ORAL at 02:27

## 2024-04-22 RX ADMIN — OXYCODONE HYDROCHLORIDE 5 MG: 5 TABLET ORAL at 14:31

## 2024-04-22 RX ADMIN — ACETAMINOPHEN 650 MG: 325 TABLET ORAL at 14:31

## 2024-04-22 RX ADMIN — FUROSEMIDE 20 MG: 20 TABLET ORAL at 08:29

## 2024-04-22 RX ADMIN — PANTOPRAZOLE SODIUM 40 MG: 40 TABLET, DELAYED RELEASE ORAL at 08:30

## 2024-04-22 RX ADMIN — DOCUSATE SODIUM 100 MG: 100 CAPSULE, LIQUID FILLED ORAL at 08:29

## 2024-04-22 RX ADMIN — AMIODARONE HYDROCHLORIDE 200 MG: 200 TABLET ORAL at 08:29

## 2024-04-22 RX ADMIN — IRON SUCROSE 300 MG: 20 INJECTION, SOLUTION INTRAVENOUS at 08:48

## 2024-04-22 RX ADMIN — MEXILETINE HYDROCHLORIDE 200 MG: 200 CAPSULE ORAL at 08:30

## 2024-04-22 RX ADMIN — APIXABAN 5 MG: 5 TABLET, FILM COATED ORAL at 08:29

## 2024-04-22 RX ADMIN — OXYCODONE HYDROCHLORIDE 10 MG: 5 TABLET ORAL at 08:33

## 2024-04-22 RX ADMIN — SACUBITRIL AND VALSARTAN 0.5 TABLET: 24; 26 TABLET, FILM COATED ORAL at 08:30

## 2024-04-22 RX ADMIN — ACETAMINOPHEN 650 MG: 325 TABLET ORAL at 02:26

## 2024-04-22 RX ADMIN — METOPROLOL SUCCINATE 25 MG: 25 TABLET, EXTENDED RELEASE ORAL at 08:30

## 2024-04-22 ASSESSMENT — PAIN - FUNCTIONAL ASSESSMENT: PAIN_FUNCTIONAL_ASSESSMENT: ACTIVITIES ARE NOT PREVENTED

## 2024-04-22 ASSESSMENT — PAIN DESCRIPTION - ORIENTATION: ORIENTATION: POSTERIOR

## 2024-04-22 ASSESSMENT — PAIN SCALES - GENERAL
PAINLEVEL_OUTOF10: 0
PAINLEVEL_OUTOF10: 0
PAINLEVEL_OUTOF10: 7
PAINLEVEL_OUTOF10: 8
PAINLEVEL_OUTOF10: 0
PAINLEVEL_OUTOF10: 10

## 2024-04-22 ASSESSMENT — PAIN DESCRIPTION - DESCRIPTORS
DESCRIPTORS: ACHING;SORE
DESCRIPTORS: ACHING;BURNING

## 2024-04-22 ASSESSMENT — PAIN DESCRIPTION - LOCATION: LOCATION: ABDOMEN;BACK

## 2024-04-22 NOTE — CARE COORDINATION
CASE MANAGEMENT DISCHARGE SUMMARY      Discharge to: HOME WITH [facility]  They are aware he is at a different address:  [address]   [address]  New Durable Medical Equipment ordered/agency: NO    Transportation:    Family/car: YES     Confirmed discharge plan with:     Patient: yes     Family:  yes    Name: Contact number:     Facility/Agency, name:  [facility] said she can pull everything from Epic   Phone number for report to facility:      RN, name: Nate    Note: Discharging nurse to complete SANTO, reconcile AVS, and place final copy with patient's discharge packet. RN to ensure that written prescriptions for  Level II medications are sent with patient to the facility as per protocol.      Lo Landaverde RN

## 2024-04-22 NOTE — PLAN OF CARE
CHF Care Plan      Patient's EF (Ejection Fraction) is less than 40%    Heart Failure Medications:  Diuretics:: Furosemide    (One of the following REQUIRED for EF </= 40%/SYSTOLIC FAILURE but MAY be used in EF% >40%/DIASTOLIC FAILURE)        ACE:: None        ARB:: None         ARNI:: Sacubitril/Valsartan-Entresto    (Beta Blockers)  NON- Evidenced Based Beta Blocker (for EF% >40%/DIASTOLIC FAILURE): None    Evidenced Based Beta Blocker::(REQUIRED for EF% <40%/SYSTOLIC FAILURE) Metoprolol SUCCinate- Toprol XL  ...................................................................................................................................................    Failed to redirect to the Timeline version of the [employer] SmartLink.      Patient's weights and intake/output reviewed    Daily Weight log at bedside, patient/family participation in use of log: \"yes    Patient's current weight today shows a difference of 1 lbs more than last documented weight.      Intake/Output Summary (Last 24 hours) at 4/21/2024 2040  Last data filed at 4/21/2024 1630  Gross per 24 hour   Intake 1080 ml   Output 825 ml   Net 255 ml         Education Booklet Provided: yes    Comorbidities Reviewed Yes    Patient has a past medical history of Muscular dystrophy (HCC).     >>For CHF and Comorbidity documentation on Education Time and Topics, please see Education Tab      Pt resting in bed at this time on room air. Pt denies shortness of breath. Pt without lower extremity edema.     Patient and/or Family's stated Goal of Care this Admission: increase activity tolerance, better understand heart failure and disease management, and be more comfortable prior to discharge        :

## 2024-04-22 NOTE — TELEPHONE ENCOUNTER
----- Message from Musa Murillo MD sent at 4/17/2024  9:04 AM EDT -----    Patient having some issues of insomnia and some snoring  Would probably advise the patient get a sleep study as the patient has risk factors for both obstructive sleep apnea and central sleep apnea  Would need an in lab sleep study  Will send to the office for follow-up

## 2024-04-22 NOTE — DISCHARGE INSTR - COC
1225 [Urine:1225]    Safety Concerns:     At Risk for Falls    Impairments/Disabilities:      None    Nutrition Therapy:  Current Nutrition Therapy:   - Oral Diet:  General    Routes of Feeding: Oral  Liquids: No Restrictions  Daily Fluid Restriction: no  Last Modified Barium Swallow with Video (Video Swallowing Test): not done    Treatments at the Time of Hospital Discharge:   Respiratory Treatments: N/A  Oxygen Therapy:  is not on home oxygen therapy.  Ventilator:    - No ventilator support    Rehab Therapies: Physical Therapy and Occupational Therapy  Weight Bearing Status/Restrictions: No weight bearing restrictions  Other Medical Equipment (for information only, NOT a DME order):  wheelchair  Other Treatments: none    Patient's personal belongings (please select all that are sent with patient):  None    RN SIGNATURE:  Electronically signed by Nate Gerardo RN on 4/22/24 at 3:55 PM EDT    CASE MANAGEMENT/SOCIAL WORK SECTION    Inpatient Status Date: ***    Readmission Risk Assessment Score:  Readmission Risk              Risk of Unplanned Readmission:  24           Discharging to Facility/ Agency   Name:   Address:  Phone:  Fax:    Dialysis Facility (if applicable)   Name:  Address:  Dialysis Schedule:  Phone:  Fax:    / signature: {Esignature:[id number]}    PHYSICIAN SECTION    Prognosis: Good    Condition at Discharge: Stable    Rehab Potential (if transferring to Rehab): Good    Recommended Labs or Other Treatments After Discharge: None    Physician Certification: I certify the above information and transfer of Alex Isaac  is necessary for the continuing treatment of the diagnosis listed and that he requires Home Care for less 30 days.     Update Admission H&P: No change in H&P    PHYSICIAN SIGNATURE:  Electronically signed by Waldo Clifford MD on 4/22/24 at 1:53 PM EDT

## 2024-04-22 NOTE — CARE COORDINATION
LOS day 6- No new changes- DC plan is home with  Nsg/pt/ot  at home. [facility] is following.    HE will be going to this address at his niece's:  :  [address] [place] [address]

## 2024-04-22 NOTE — DISCHARGE SUMMARY
stomach are unremarkable. Pelvis: Bladder and prostate gland are unremarkable.  Dystrophic calcifications just superior to the right vaginal cuff similar to prior examination. Peritoneum/Retroperitoneum: Right retroperitoneal hematoma is slightly improved compared to prior examination, measuring 3.4 cm in AP dimension compared to 4.2 cm previously when measured at the level of the mid psoas muscle. Bones/Soft Tissues: No acute osseous abnormality.     Slightly decreased right retroperitoneal hematoma.  No evidence of active extravasation/bleeding.     CT ABDOMEN PELVIS W IV CONTRAST Additional Contrast? None    Addendum Date: 4/16/2024    ADDENDUM: Critical results were called by Dr. Kimberly Blum to Dr. Kohler on 4/16/2024 at 19:11.     Result Date: 4/16/2024  EXAMINATION: CT OF THE ABDOMEN AND PELVIS WITH CONTRAST 4/16/2024 5:47 pm TECHNIQUE: CT of the abdomen and pelvis was performed with the administration of intravenous contrast. Multiplanar reformatted images are provided for review. Automated exposure control, iterative reconstruction, and/or weight based adjustment of the mA/kV was utilized to reduce the radiation dose to as low as reasonably achievable. COMPARISON: Earlier today HISTORY: ORDERING SYSTEM PROVIDED HISTORY: retroperitoneal hematoma TECHNOLOGIST PROVIDED HISTORY: Reason for exam:->retroperitoneal hematoma Additional Contrast?->None FINDINGS: Lower chest: Heart size is normal. Lungs are mild basilar atelectasis. Liver: Liver is normal density. No enhancing masses.  Normal enhancement of the intrahepatic vasculature. Spleen:  Normal size.  No enhancing masses Pancreas: No enhancing masses.  No ductal dilation. No adjacent fatty stranding. Gallbladder resected.  Clips in the gallbladder fossa Bile ducts: No biliary ductal dilation Adrenals: The adrenal glands are unremarkable Kidneys: Enhancement of the right kidney a slightly heterogeneous.  There is scarring and irregularity of the right

## 2024-04-22 NOTE — PROGRESS NOTES
[facility]     Electrophysiology                                     Progress Note    Admission date:  2024    Reason for follow up visit: Ventricular tachycardia    HPI/CC: Alex Isaac was admitted on  with EPS and ventricular tachycardia ablation.  Postprocedure was complicated by severe right groin, scrotum and lower right abdominal pain.  Patient became hypotensive.  CT scan revealed large retroperitoneal hematoma and scrotal hematoma.  Vascular consulted.  Patient taken to the OR for repair of right iliac artery.    Rhythm has been paced with occasional PVCs      Subjective: Patient denies chest pain, shortness of breath and palpitations.  Patient does complain of right lower abdominal pain and distention.  Pain is improving but distention is still present.  He also complains of scrotal edema.  No bleeding or hematoma noted at access site.  His BP was 89 early this morning and has improved to SBP. 130s . Hemoglobin down to 8.4 from 15 on admission.  CT abdomen pelvis repeated this morning and revealed no active bleeding    Vitals:  Blood pressure (!) 137/97, pulse 94, temperature 98.9 °F (37.2 °C), temperature source Oral, resp. rate 16, height 1.651 m (5' 5\"), weight 68.5 kg (151 lb), SpO2 95 %.  Temp  Av.8 °F (37.1 °C)  Min: 98.4 °F (36.9 °C)  Max: 98.9 °F (37.2 °C)  Pulse  Av.7  Min: 60  Max: 94  BP  Min: 86/62  Max: 143/77  SpO2  Av %  Min: 94 %  Max: 98 %    24 hour I/O    Intake/Output Summary (Last 24 hours) at 2024 1235  Last data filed at 2024 1115  Gross per 24 hour   Intake 960 ml   Output 1925 ml   Net -965 ml       Current Facility-Administered Medications   Medication Dose Route Frequency Provider Last Rate Last Admin    ondansetron (ZOFRAN-ODT) disintegrating tablet 4 mg  4 mg Oral Q8H PRN Luz Owusu MD   4 mg at 24 0832    metoprolol succinate (TOPROL XL) extended release tablet 25 mg  25 mg Oral Daily Elisa Martinez, APRN - CNP    
    [facility]     Electrophysiology                                     Progress Note    Admission date:  2024    Reason for follow up visit: Ventricular tachycardia    HPI/CC: Alex Isaac was admitted on  for EPS and ventricular tachycardia ablation.  Postprocedure course was complicated by severe right groin, scrotum and lower right abdominal pain.  Patient became hypotensive.  CT scan revealed large retroperitoneal hematoma and scrotal hematoma.  Vascular consulted.  Patient taken to the OR for repair of right iliac artery.      Subjective: Lying flat in bed.  Denies any chest pain or shortness of breath.  No edema.  Only complaint is tightness or tugging at right groin site.  He was able to get up to sit in chair last evening without issues.  He notes nausea with oral iron    Vitals:  Blood pressure 97/67, pulse 60, temperature 98.8 °F (37.1 °C), temperature source Oral, resp. rate 18, height 1.651 m (5' 5\"), weight 68.9 kg (152 lb), SpO2 96 %.  Temp  Av.8 °F (37.1 °C)  Min: 98.3 °F (36.8 °C)  Max: 99.2 °F (37.3 °C)  Pulse  Av.8  Min: 60  Max: 94  BP  Min: 84/55  Max: 143/77  SpO2  Av.3 %  Min: 95 %  Max: 98 %    24 hour I/O    Intake/Output Summary (Last 24 hours) at 2024 0717  Last data filed at 2024 0400  Gross per 24 hour   Intake 680 ml   Output 2000 ml   Net -1320 ml       Current Facility-Administered Medications   Medication Dose Route Frequency Provider Last Rate Last Admin    ondansetron (ZOFRAN-ODT) disintegrating tablet 4 mg  4 mg Oral Q8H PRN Luz Owusu MD   4 mg at 24 0832    metoprolol succinate (TOPROL XL) extended release tablet 25 mg  25 mg Oral Daily Elisa Martinez APRN - CNP   25 mg at 24 1245    docusate sodium (COLACE) capsule 100 mg  100 mg Oral BID Elisa Martinez APRN - CNP        ferrous sulfate (IRON 325) tablet 325 mg  325 mg Oral BID  Estee Calderon APRN - CNP   325 mg at 24 1654    furosemide (LASIX) 
    [facility]     Electrophysiology                                     Progress Note    Admission date:  2024    Reason for follow up visit: Ventricular tachycardia    HPI/CC: Alex Isaac was admitted on  with EPS and ventricular tachycardia ablation.  Postprocedure was complicated by severe right groin, scrotum and lower right abdominal pain.  Patient became hypotensive.  CT scan revealed large retroperitoneal hematoma and scrotal hematoma.  Vascular consulted.  Patient taken to the OR for repair of right iliac artery.    Rhythm has been paced with occasional PVCs      Subjective: Patient denies chest pain, shortness of breath and palpitations.  Patient does complain of right lower abdominal pain and distention.  He also complains of scrotal edema.  No bleeding or hematoma noted at access site. He has been hypotensive overnight. SBP down to 76.  Hemoglobin down to 9.9 from 15 on admission    Vitals:  Blood pressure (!) 100/58, pulse 61, temperature 98.8 °F (37.1 °C), temperature source Oral, resp. rate 18, height 1.651 m (5' 5\"), weight 62.2 kg (137 lb 2 oz), SpO2 99 %.  Temp  Av.6 °F (37 °C)  Min: 98.4 °F (36.9 °C)  Max: 98.8 °F (37.1 °C)  Pulse  Av.2  Min: 60  Max: 62  BP  Min: 76/45  Max: 112/75  SpO2  Av.5 %  Min: 96 %  Max: 100 %    24 hour I/O    Intake/Output Summary (Last 24 hours) at 2024 0942  Last data filed at 2024 0600  Gross per 24 hour   Intake 820 ml   Output 2010 ml   Net -1190 ml       Current Facility-Administered Medications   Medication Dose Route Frequency Provider Last Rate Last Admin    albumin human 25% IV solution 25 g  25 g IntraVENous Q6H Trudy Tyler APRN -  mL/hr at 24 0855 25 g at 24 0855    furosemide (LASIX) injection 20 mg  20 mg IntraVENous Daily Trudy Tyler APRN - CNP        mupirocin (BACTROBAN) 2 % ointment   Each Nostril BID Trudy Tyler APRN - CNP   Given at 24 0856    sodium 
  Hospital Medicine Progress Note      Date of Admission: 4/16/2024  Hospital Day: 4    Chief Admission Complaint:  admitted for EP study and VT ablation.      Subjective: He is lying in bed.  Complaining of some lower back pain.  Denies chest pain or shortness of breath.  He is eating and drinking well.    Presenting Admission History:       38 y.o. male with PMH significant for muscular dystrophy, proximal atrial fibrillation, nonischemic cardiomyopathy with EF less than 20%.  Ventricular tachycardia with AICD in place.    He presented to [facility] today for EP study and VT ablation.      He did present in early February/ 24 with ICD shock (appeared he may have stopped using his amiodarone due to running out of medication). The appearance of the arrhythmia was that of true VT.   His amiodarone was restarted at 200 mg daily but he continued to have VT episodes.   Also mexiletine was added.    His device interrogation shows continuing episodes of NSVT and longer but slower VT but still happening.   Therefore it was decided to do VT ablation.        Patient did tolerate the procedure well.  However after the procedure he reported pain throughout the right abdomen and right femoral site.  CT scan abdomen pelvis without contrast was performed.  This did reveal a large retroperitoneal hematoma on the right side which extended into the right scrotal sac.  Hb/HCT pre procedure= 15/44.7  Postprocedure= 12.2/36.7   He was transfused with PRBCs, and H/H is being followed.  Vascular consulted. Patient was taken to the OR for repair of right iliac artery on 4/16/24.   He was subsequently admitted to the ICU.      Assessment/Plan:      Current Principal Problem:  Status post ablation of ventricular arrhythmia    Status post ablation of ventricular tachycardia:   He did undergo this procedure on 4/17/24 and tolerated well.  He will continue on amiodarone,Mexilitine.   He was also on metoprolol but that is now being 
  Hospital Medicine Progress Note      Date of Admission: 4/16/2024  Hospital Day: 6    Chief Admission Complaint:  admitted for EP study and VT ablation.      Subjective: He is lying in bed, no acute distress this time.  Pain in groin is improving.  Denies chest pain or shortness of breath    Presenting Admission History:       38 y.o. male with PMH significant for muscular dystrophy, proximal atrial fibrillation, nonischemic cardiomyopathy with EF less than 20%.  Ventricular tachycardia with AICD in place.    He presented to [facility] today for EP study and VT ablation.      He did present in early February/ 24 with ICD shock (appeared he may have stopped using his amiodarone due to running out of medication). The appearance of the arrhythmia was that of true VT.   His amiodarone was restarted at 200 mg daily but he continued to have VT episodes.   Also mexiletine was added.    His device interrogation shows continuing episodes of NSVT and longer but slower VT but still happening.   Therefore it was decided to do VT ablation.        Patient did tolerate the procedure well.  However after the procedure he reported pain throughout the right abdomen and right femoral site.  CT scan abdomen pelvis without contrast was performed.  This did reveal a large retroperitoneal hematoma on the right side which extended into the right scrotal sac.  Hb/HCT pre procedure= 15/44.7  Postprocedure= 12.2/36.7   He was transfused with PRBCs, and H/H is being followed.  Vascular consulted. Patient was taken to the OR for repair of right iliac artery on 4/16/24.   He was subsequently admitted to the ICU.  His condition is improving.  He did go on 4/20/2024 for repeat abdominal CT scan.  This revealed that there is no active bleeding from the arterial repair site.        Assessment/Plan:      Current Principal Problem:  Status post ablation of ventricular arrhythmia    Status post ablation of ventricular tachycardia:   He 
  Physician Progress Note      PATIENT:               ELROY VARNER  CSN #:                  [id number]  :                       1985  ADMIT DATE:       2024 5:47 AM  DISCH DATE:  RESPONDING  PROVIDER #:        Luz Owusu MD          QUERY TEXT:    Pt has anemia documented, hgb 15 then down to 9.9. Patient with external iliac   artery injury with hematoma.  If possible, please document in progress notes   and discharge summary further specificity regarding the acuity and type of   anemia:    The medical record reflects the following:  Risk Factors: afib, CM, postop VT ablation, large retroperitoneal hematoma  Clinical Indicators: anemia,  hgb 15 then down to 9.9, hypotension  Treatment: serial labs, blood transfusion    Thank you,  Heather Kearns RN,BSN,CCDS,CRCR  Options provided:  -- Anemia due to acute blood loss  -- Anemia due to postoperative blood loss  -- Other - I will add my own diagnosis  -- Disagree - Not applicable / Not valid  -- Disagree - Clinically unable to determine / Unknown  -- Refer to Clinical Documentation Reviewer    PROVIDER RESPONSE TEXT:    This patient has acute blood loss anemia.    Query created by: Heather Kearns on 2024 12:53 PM      QUERY TEXT:    Pt with retroperitoneal hemorrhage and external iliac artery injury.  Patient   with drop in hgb and hypotension-82/56.  Patient received albumin, blood   transfusion, and Levophed.   If possible, please document in the progress   notes and discharge summary if you are evaluating and/or treating any of the   following:    The medical record reflects the following:  Risk Factors: retroperitoneal hemorrhage and external iliac artery injury  Clinical Indicators: drop in hgb and hypotension-82/56  Treatment: albumin, blood transfusion, and Levophed, ICU    Thank you,  Heather Kearns RN,BSN,CCDS,CRCR  Options provided:  -- Hemorrhagic Shock  -- Hypovolemic Shock  -- Hypovolemia without Shock  -- Hypotension without Shock  -- 
  Vascular Surgery Progress Note      POD #3  S/P Repair right iliac artery    Chief Complaint: Postop follow up      SUBJECTIVE:  has no new complains. States pain is a little better. Voiding well after urinary catheter removed. OAC restarted    OBJECTIVE    Physical  CURRENT VITALS:  BP (!) 96/55   Pulse 60   Temp 98.3 °F (36.8 °C) (Oral)   Resp 16   Ht 1.651 m (5' 5\")   Wt 67.8 kg (149 lb 7.6 oz)   SpO2 99%   BMI 24.87 kg/m²   24 HR INTAKE/OUTPUT:    Intake/Output Summary (Last 24 hours) at 4/19/2024 0722  Last data filed at 4/19/2024 0600  Gross per 24 hour   Intake 1199.59 ml   Output 1235 ml   Net -35.41 ml       Right groin incision soft, clean, dry and intact with dermabond and Prineo  Palpable right PT pulse    Data  CBC:   Recent Labs     04/16/24  1612 04/16/24  2117 04/17/24  0439 04/18/24  0500 04/19/24  0456   WBC 13.0* 17.9* 12.3* 10.5 7.7   HGB 12.2* 13.4* 11.0* 9.9* 9.1*   HCT 36.7* 40.9 32.8* 29.2* 26.5*   MCV 87.6 88.9 88.3 88.0 88.7    180 150 143 130*     BMP:   Recent Labs     04/17/24  0439 04/18/24  0500 04/19/24  0456    137 138   K 4.0  4.0 3.9 3.9    103 101   CO2 22 26 25   GLUCOSE 124* 101* 124*   BUN 19 20 21*   CREATININE 0.9 0.9 1.0   CALCIUM 8.2* 8.3 8.9   MG 2.20 2.00 2.00     Current Inpatient Medications  Current Facility-Administered Medications: ferrous sulfate (IRON 325) tablet 325 mg, 325 mg, Oral, BID WC  albumin human 25% IV solution 25 g, 25 g, IntraVENous, Q6H  furosemide (LASIX) injection 20 mg, 20 mg, IntraVENous, Daily  apixaban (ELIQUIS) tablet 5 mg, 5 mg, Oral, BID  mupirocin (BACTROBAN) 2 % ointment, , Each Nostril, BID  sodium chloride flush 0.9 % injection 5-40 mL, 5-40 mL, IntraVENous, 2 times per day  sodium chloride flush 0.9 % injection 5-40 mL, 5-40 mL, IntraVENous, PRN  0.9 % sodium chloride infusion, , IntraVENous, PRN  amiodarone (CORDARONE) tablet 200 mg, 200 mg, Oral, Daily  [Held by provider] metoprolol succinate (TOPROL XL) 
  Vascular Surgery Progress Note      POD#  2  S/P Repair right iliac artery    Chief Complaint: Postop follow up      SUBJECTIVE:  complains of back pain    OBJECTIVE    Physical  CURRENT VITALS:  BP (!) 81/49   Pulse 60   Temp 98.4 °F (36.9 °C) (Oral)   Resp 17   Ht 1.651 m (5' 5\")   Wt 62.2 kg (137 lb 2 oz)   SpO2 96%   BMI 22.82 kg/m²   24 HR INTAKE/OUTPUT:    Intake/Output Summary (Last 24 hours) at 4/18/2024 0840  Last data filed at 4/18/2024 0600  Gross per 24 hour   Intake 1070 ml   Output 2050 ml   Net -980 ml     Right groin incision soft, clean, dry and intact with dermabond and Prineo  Still has scrotal edema  Palpable right PT pulse      Data  CBC:   Recent Labs     04/16/24  0712 04/16/24  1612 04/16/24  2117 04/17/24  0439 04/18/24  0500   WBC 6.8 13.0* 17.9* 12.3* 10.5   HGB 15.0 12.2* 13.4* 11.0* 9.9*   HCT 44.7 36.7* 40.9 32.8* 29.2*   MCV 87.7 87.6 88.9 88.3 88.0    181 180 150 143     BMP:   Recent Labs     04/16/24  0712 04/17/24  0439 04/18/24  0500    138 137   K 3.9 4.0  4.0 3.9    106 103   CO2 27 22 26   GLUCOSE 93 124* 101*   BUN 27* 19 20   CREATININE 1.6* 0.9 0.9   CALCIUM 9.3 8.2* 8.3   MG  --  2.20 2.00       Current Inpatient Medications  Current Facility-Administered Medications: albumin human 25% IV solution 25 g, 25 g, IntraVENous, Q6H  furosemide (LASIX) injection 20 mg, 20 mg, IntraVENous, Daily  mupirocin (BACTROBAN) 2 % ointment, , Each Nostril, BID  sodium chloride flush 0.9 % injection 5-40 mL, 5-40 mL, IntraVENous, 2 times per day  sodium chloride flush 0.9 % injection 5-40 mL, 5-40 mL, IntraVENous, PRN  0.9 % sodium chloride infusion, , IntraVENous, PRN  amiodarone (CORDARONE) tablet 200 mg, 200 mg, Oral, Daily  [Held by provider] metoprolol succinate (TOPROL XL) extended release tablet 100 mg, 100 mg, Oral, Daily  mexiletine (MEXITIL) capsule 200 mg, 200 mg, Oral, BID  pantoprazole (PROTONIX) tablet 40 mg, 40 mg, Oral, QAM AC  [Held by provider] 
  Vascular Surgery Progress Note      SUBJECTIVE:  c/o back pain.      OBJECTIVE    Physical  CURRENT VITALS:  BP (!) 94/56   Pulse 60   Temp 99.7 °F (37.6 °C) (Bladder)   Resp 19   Ht 1.651 m (5' 5\")   Wt 63.5 kg (139 lb 15.9 oz)   SpO2 100%   BMI 23.30 kg/m²   24 HR INTAKE/OUTPUT:    Intake/Output Summary (Last 24 hours) at 4/17/2024 0747  Last data filed at 4/17/2024 0400  Gross per 24 hour   Intake 2100 ml   Output 650 ml   Net 1450 ml     R groin incision intact  No bleeding or hematoma  Palp R PT pulse    Data  CBC:   Lab Results   Component Value Date/Time    WBC 12.3 04/17/2024 04:39 AM    RBC 3.72 04/17/2024 04:39 AM    HGB 11.0 04/17/2024 04:39 AM    HCT 32.8 04/17/2024 04:39 AM    MCV 88.3 04/17/2024 04:39 AM    MCH 29.6 04/17/2024 04:39 AM    MCHC 33.5 04/17/2024 04:39 AM    RDW 14.5 04/17/2024 04:39 AM     04/17/2024 04:39 AM    MPV 8.5 04/17/2024 04:39 AM     CMP:    Lab Results   Component Value Date/Time     04/17/2024 04:39 AM    K 4.0 04/17/2024 04:39 AM    K 4.0 04/17/2024 04:39 AM     04/17/2024 04:39 AM    CO2 22 04/17/2024 04:39 AM    BUN 19 04/17/2024 04:39 AM    CREATININE 0.9 04/17/2024 04:39 AM    GFRAA >60 12/17/2010 01:45 PM    AGRATIO 1.5 04/17/2024 04:39 AM    LABGLOM >90 04/17/2024 04:39 AM    GLUCOSE 124 04/17/2024 04:39 AM    PROT 5.9 04/17/2024 04:39 AM    CALCIUM 8.2 04/17/2024 04:39 AM    BILITOT 0.7 04/17/2024 04:39 AM    ALKPHOS 85 04/17/2024 04:39 AM     04/17/2024 04:39 AM     04/17/2024 04:39 AM         ASSESSMENT AND PLAN    S/P Repair Iliac artery.  No hemodynamic instability.       RP and scrotal hematoma not evacuated- will slowly reabsorb.  Expect continued swelling and bruising for some time.      Ok to ambulate, d/c hernandez.  Can resume anticoagulation if necessary.   
4/17/2024 07-19  VSS afebrile  Right femoral incision clean and dry  Percocet 10 mg x 2 today  Lasix 20 mg IV x 1 today--excellent diuresis  Continues with significant scrotal edema  Worked with PT today--unable to ambulate to chair  Right foot PT 2+ signal (palpable)  
4/17/2024 07-19  VSS afebrile  Right femoral incision clean and dry  Percocet 10 mg x 2 today  Lasix 20 mg IV x 1 today--excellent diuresis  Continues with significant scrotal edema  Worked with PT today--unable to ambulate to chair  Right foot PT 2+ signal (palpable)    04/18/2024 07-19  Jeffery catheter removed  Pt has voided x 2  Up in chair   IV restarted in right arm  Albumin 100 mls   KALEIGH IGLESIAS RN      
5Intensive Care Progress Note    Patient: Alex Isaac MRN: [id number]  Date of  Admission: 4/16/2024   YOB: 1985  Age: 38 y.o.  Sex: male    Unit: [place] [address] CVU  Room/Bed: 0223/0223-01 Attending Physician: Luz Graves MD   Admitting Physician: LUZ GRAVES        History Obtained From   patient, electronic medical record     History of Present Illness   This is a 46-year-old gentleman with a past medical history of muscular dystrophy, proximal atrial fibrillation and nonischemic cardiomyopathy the with an ejection fraction of less than 20% who came to [facility] on 4/16/2024 for EP study and VT ablation.  Patient had an AICD in place previously.  Patient was having VT episodes despite being on amiodarone.  Patient had tolerated the procedure well however after the procedure the patient started having right abdominal pain and right femoral site pain CT of the abdomen was performed and showed a large retroperitoneal hematoma at the right side extending into the right scrotal sac.  Patient had a drop of hemoglobin from 15/44 down to 12/36.  Patient had vascular surgery see the patient later that afternoon.  And had been taken to the OR.  Patient had a retroperitoneal hematoma, and an external iliac artery injury.  This was repaired by vascular surgery.  Patient was admitted to the ICU because of the need of close monitoring and hypotension.  Patient did require Levophed initially but has come off.      Subjective:    Overall no issues  The back pain that was described yesterday is getting better  No nausea or vomiting  No chest pains or palpitations  No issues with bowel movements  No hemoptysis  No hematemesis        ROS:A comprehensive review of systems was negative except for: Above    Objective:         Intake and Output:   Current Shift:   No intake/output data recorded.  Last three shifts:   04/19 1901 - 04/21 0700  In: 1160 [P.O.:1160]  Out: 2650 [Urine:2650]    Vent settings for last 24 
Admission - sent to Dr Owusu  
Call placed to [facility] to verify tele monitor which was confirmed.    
Intensive Care Progress Note    Patient: Alex Isaac MRN: [id number]  Date of  Admission: 4/16/2024   YOB: 1985  Age: 38 y.o.  Sex: male    Unit: [place] [address] CVU  Room/Bed: 0223/0223-01 Attending Physician: Luz Graves MD   Admitting Physician: LUZ GRAVES        History Obtained From   patient, electronic medical record     History of Present Illness   This is a 46-year-old gentleman with a past medical history of muscular dystrophy, proximal atrial fibrillation and nonischemic cardiomyopathy the with an ejection fraction of less than 20% who came to [facility] on 4/16/2024 for EP study and VT ablation.  Patient had an AICD in place previously.  Patient was having VT episodes despite being on amiodarone.  Patient had tolerated the procedure well however after the procedure the patient started having right abdominal pain and right femoral site pain CT of the abdomen was performed and showed a large retroperitoneal hematoma at the right side extending into the right scrotal sac.  Patient had a drop of hemoglobin from 15/44 down to 12/36.  Patient had vascular surgery see the patient later that afternoon.  And had been taken to the OR.  Patient had a retroperitoneal hematoma, and an external iliac artery injury.  This was repaired by vascular surgery.  Patient was admitted to the ICU because of the need of close monitoring and hypotension.  Patient did require Levophed initially but has come off.      Subjective:      Overall very well  No chest pains or palpitations  No nausea vomiting  Still feeling some weakness    ROS:A comprehensive review of systems was negative except for: Above    Objective:         Intake and Output:   Current Shift:   No intake/output data recorded.  Last three shifts:   04/17 1901 - 04/19 0700  In: 1319.6 [P.O.:1020; I.V.:5]  Out: 1510 [Urine:1510]    Vent settings for last 24 hours:  [unfilled]    Hemodynamic parameters for last 24 
Intensive Care Progress Note    Patient: Alex Isaac MRN: [id number]  Date of  Admission: 4/16/2024   YOB: 1985  Age: 38 y.o.  Sex: male    Unit: [place] [address] CVU  Room/Bed: 0223/0223-01 Attending Physician: Luz Graves MD   Admitting Physician: LUZ GRAVES        History Obtained From   patient, electronic medical record     History of Present Illness   This is a 46-year-old gentleman with a past medical history of muscular dystrophy, proximal atrial fibrillation and nonischemic cardiomyopathy the with an ejection fraction of less than 20% who came to [facility] on 4/16/2024 for EP study and VT ablation.  Patient had an AICD in place previously.  Patient was having VT episodes despite being on amiodarone.  Patient had tolerated the procedure well however after the procedure the patient started having right abdominal pain and right femoral site pain CT of the abdomen was performed and showed a large retroperitoneal hematoma at the right side extending into the right scrotal sac.  Patient had a drop of hemoglobin from 15/44 down to 12/36.  Patient had vascular surgery see the patient later that afternoon.  And had been taken to the OR.  Patient had a retroperitoneal hematoma, and an external iliac artery injury.  This was repaired by vascular surgery.  Patient was admitted to the ICU because of the need of close monitoring and hypotension.  Patient did require Levophed initially but has come off.      Subjective:    Patient having abdominal pain, located on the back posterior abdominal area  No nausea or vomiting  No chest pains  No problems with eating or defecating  No problems with urinating  No hemoptysis        ROS:A comprehensive review of systems was negative except for: Above    Objective:         Intake and Output:   Current Shift:   04/20 0701 - 04/20 1900  In: -   Out: 375 [Urine:375]  Last three shifts:   04/18 1901 - 04/20 0700  In: 1379.6 [P.O.:1080; I.V.:5]  Out: 1625 
Occupational Therapy  Facility/Department: [facility] C2 CARD TELEMETRY  Occupational Therapy Initial Assessment and Treatment Note    Name: Alex Isaac  : 1985  MRN: [id number]  Date of Service: 2024    Discharge Recommendations:  Continue to assess pending progress      Patient Diagnosis(es): There were no encounter diagnoses.  Past Medical History:  has a past medical history of Muscular dystrophy (HCC).  Past Surgical History:  has a past surgical history that includes Pacemaker insertion and vascular surgery (Right, 2024).     Assessment   Performance deficits / Impairments: Decreased functional mobility ;Decreased ADL status;Decreased strength;Decreased endurance;Decreased balance  Assessment: Pt seen for OT eval and tx within his activity tolerance. Pt was limited by lower back and R groin pain for bed mobility. He was unable to sit EOB and began to lean posteriorly. Pt assisted back to bed.  OT will cont to assess function and make d/c recommendations based on ability to transfer. Cont OT in acute care.  Co-tx collaboration this date to safely meet goals and will have better occupational performance outcomes with in a co-treatment than 1:1 session.    Prognosis: Good  Decision Making: Medium Complexity  REQUIRES OT FOLLOW-UP: Yes  Activity Tolerance  Activity Tolerance: Patient limited by pain        Plan   Occupational Therapy Plan  Times Per Week: 3-5x  Current Treatment Recommendations: Balance training, Functional mobility training, Endurance training, Safety education & training, Self-Care / ADL     Restrictions  Restrictions/Precautions  Restrictions/Precautions: Fall Risk  Position Activity Restriction  Other position/activity restrictions: activity as tolerated    Subjective   General  Chart Reviewed: Yes  Patient assessed for rehabilitation services?: Yes  Additional Pertinent Hx: Hx muscular dystrophy,  Recent ventricular fibrillation ablation procedure resulting in retroperitoneal 
Occupational Therapy  Facility/Department: [facility] C2 CARD TELEMETRY  Daily Treatment Note  NAME: Alex Isaac  : 1985  MRN: [id number]    Date of Service: 2024    Discharge Recommendations:  Subacute/Skilled Nursing Facility  OT Equipment Recommendations  Equipment Needed: No  Other: Defer to next level of care    If pt is unable to be seen after this session, please let this note serve as discharge summary.  Please see case management note for discharge disposition.  Thank you.    Patient Diagnosis(es): There were no encounter diagnoses.     Assessment    Assessment: Co-tx collaboration this date to safely meet goals and will have better occupational performance outcomes with in a co-treatment than 1:1 session.  Pt tolerated co-tx fair, requiring min Ax2 for sit<>stand transfer at stedy and stand pivot transfer recliner<>WC w/o AD. He also required min Ax2 for toilet transfer w/ stedy as well. Pt sat at stedy to perform grooming tasks w/ min A and set up 2/2 having to support BUE on stedy for balance as pt is unable to achieve or maintain a fully upright posture. He required set up for toileting tasks. Pt is limited by weakness, fatigue, balance, endurance, pain, and activity tolerance and will benefit from continued acute OT. Recommend SNF upon d/c to address functional deficits and increase safety and IND prior to return home.  Activity Tolerance: Patient limited by pain;Patient limited by fatigue;Patient limited by endurance  Discharge Recommendations: Subacute/Skilled Nursing Facility  Equipment Needed: No  Other: Defer to next level of care      Plan   Occupational Therapy Plan  Times Per Week: 3-5x  Current Treatment Recommendations: Balance training;Functional mobility training;Endurance training;Safety education & training;Self-Care / ADL     Restrictions  Restrictions/Precautions  Restrictions/Precautions: Fall Risk;General Precautions  Required Braces or Orthoses?: No  Position Activity 
Occupational Therapy  Facility/Department: [facility] C2 CARD TELEMETRY  Daily Treatment Note  NAME: Alex Isaac  : 1985  MRN: [id number]    Date of Service: 2024    Discharge Recommendations:  Subacute/Skilled Nursing Facility   Barriers to Home Discharge:   [x] Steps to access home entry or bed/bath:   [x] Unable to transfer, ambulate, or propel wheelchair household distances without assist   [x] Limited available assist at home upon discharge    [] Patient or family requests d/c to post-acute facility    [] Poor cognition/safety awareness for d/c to home alone    [] Unable to maintain ordered weight bearing status    [] Patient with salient signs of long-standing immobility   [x] Decreased independence with ADLs   [x] Increased risk for falls   [] Other:  If pt is unable to be seen after this session, please let this note serve as discharge summary.  Please see case management note for discharge disposition.  Thank you.     Patient Diagnosis(es): There were no encounter diagnoses.     Assessment    Assessment: Pt is functioning below baseline for ADLs and mobiility. Pt is limited by pain in RLE, back and scrotum. He required total assist for LE dressing for shoes.  Pt stood 1x in Stedy and was flexed forward at hips, unable to come to upright posture. Pt stood 2x from chair and used wall for support. He was able to stand upright with max x2 for sit to stand. He uses rollator with high forearm supports at home. Pt reports that posture, strength and balance is affected by MD at baseline; however, pt was Mod I with mobility prior to admission. OT recommends SNF for skilled OT for return to baseline. Cont OT.  Co-tx collaboration this date to safely meet goals and will have better occupational performance outcomes with in a co-treatment than 1:1 session.    Activity Tolerance: Patient limited by pain;Patient limited by fatigue;Patient limited by endurance  Discharge Recommendations: Subacute/Skilled Nursing 
Patient discharged per protocol. All education completed and questions answered. All lines removed. All patients personal belongings taken with the patient. Patient left with family member in a personal car.  
Patient still c/o pain intermittently (see mar) states his pain is in his back, states everytime he comes here his back hurts, he states its from not moving around like he does at home. No new hematoma or bleeding from the right groin site. Patient has been alternating from the left side to a supine position throughout the night. He has been taking po water without any nausea or vomiting threw the night, hernandez draining and urine output adequate (see I&O). Distal lower extremity pulses ok and feet are warm with capillary refill <3 seconds, no shadowing or bruising on his back. Scrotal edema is the same since arriving to the unit.     
Physical Therapy  Facility/Department: [facility] C2 CARD TELEMETRY  Daily Treatment Note  NAME: Alex Isaac  : 1985  MRN: [id number]    Date of Service: 2024    Discharge Recommendations:  Subacute/Skilled Nursing Facility   PT Equipment Recommendations  Equipment Needed: No  Other: Pt already has walker and w/c for home use, likely no other DME will be needed    Patient Diagnosis(es): There were no encounter diagnoses.     Barriers to Home Discharge:   [x] Steps to access home entry or bed/bath:   [x] Unable to transfer, ambulate, or propel wheelchair household distances without assist   [x] Limited available assist at home upon discharge    [] Patient or family requests d/c to post-acute facility    [] Poor cognition/safety awareness for d/c to home alone    [] Unable to maintain ordered weight bearing status    [] Patient with salient signs of long-standing immobility   [x] Decreased independence with ADLs   [x] Increased risk for falls   [] Other:    Assessment   Assessment: Pt participated well with PT today despite continued high amounts of post-surgical pain in lower abdomen/groin and increased discomfort from scrotal swelling.  Pt seen in conjunction with OT for co-treat session in light of current assist requirements, in order to safely progress functional mobility.  Pt completes a total of 3-4 stands throughout session all with max Ax2.  First attempt in the stedy pt only able to partially stand on second attempt at to stand upright more. For 3rd and 4th attempt pt moved in recliner to be placed in front of a wall to use as UE support partial stand on first attempt with turnk remaining flexed on second attempt at wall pt as able to get upright fully and with BUE support on wall for balace. Pt in too much pain and too weak to attempt pivot transfer to  or ambulate this date. Pt continue to decline rehab but it is still recommened that pt DC to SNF for additional therapy prior to returning 
Physical Therapy  Facility/Department: [facility] C2 CARD TELEMETRY  Physical Therapy Initial Assessment    Name: Alex Isaac  : 1985  MRN: [id number]  Date of Service: 2024    Discharge Recommendations:  Continue to assess pending progress   PT Equipment Recommendations  Equipment Needed:  (CTA)      Patient Diagnosis(es): There were no encounter diagnoses.  Past Medical History:  has a past medical history of Muscular dystrophy (HCC).  Past Surgical History:  has a past surgical history that includes Pacemaker insertion and vascular surgery (Right, 2024).    Assessment   Assessment: Pt admitted into [facility] due to status post ablation of ventricular arrhythmia.Co-treat collaboration needed between PT/OT this date given current assist requirements, in order to safely progress toward therapy goals and maximize pt's functional performance. Pt presents with deficits in pain, strength, activity tolerance and functional mobility. Pt denies sitting EOB this date due to increased pain. Pt attempts supine>sit t/f with maxA x 2 however is limited by back, scrotum, and surgical site pain. Pt is independent with all ADL's at baseline and lives with his niece in a two story apartment with a level entry. Pt ambulates with an upright walker and wc for long distances. Pt will benefit from continued IP therapy to address these deficits and meet functional goal set. PT will need to continue to assess pending progress befire making d/c rec.  Treatment Diagnosis: Increased pain and decreased functional mobility  Therapy Prognosis: Good  Decision Making: Medium Complexity  Requires PT Follow-Up: Yes  Activity Tolerance  Activity Tolerance: Patient tolerated evaluation without incident;Patient limited by pain     Plan   Physical Therapy Plan  General Plan: 3-5 times per week  Current Treatment Recommendations: ROM, Balance training, Strengthening, Gait training, Wheelchair mobility training, Home exercise program, Stair 
Physical Therapy  Facility/Department: [facility] C2 CARD TELEMETRY  Daily Treatment Note  NAME: Alex Isaac  : 1985  MRN: [id number]    Date of Service: 2024    Discharge Recommendations:  Subacute/Skilled Nursing Facility   PT Equipment Recommendations  Equipment Needed: No  Other: Pt already has walker and w/c for home use, likely no other DME will be needed. Pt will bring in wc from home to practice with in hospital    Therapy discharge recommendations are subject to collaboration from the patient’s interdisciplinary healthcare team, including MD and case management recommendations.    Barriers to Home Discharge:   [x] Steps to access home entry or bed/bath:   [x] Unable to transfer, ambulate, or propel wheelchair household distances without assist   [x] Limited available assist at home upon discharge    [] Patient or family requests d/c to post-acute facility    [] Poor cognition/safety awareness for d/c to home alone    [] Unable to maintain ordered weight bearing status    [] Patient with salient signs of long-standing immobility   [] Decreased independence with ADLs   [x] Increased risk for falls      If pt is unable to be seen after this session, please let this note serve as discharge summary.  Please see case management note for discharge disposition.  Thank you.     Patient Diagnosis(es): There were no encounter diagnoses.    Assessment   Assessment: Pt participated in co-tx well this date. Pt completes bed>toilet>chair t/f with overall Phan x 2 as task became more demanding. Pt very motivated and wants to try activities independently being that he will \"have to do this at home\" and wants therapists to back off on assistance. Pt completes chair<>wc t/f with minAx2 going to the wc and minAx1 coming back from wc. Pt increases in mobility and is able to propel himself in the wc [id number] with SUP. Pt continues to demonstrate deficits in strength, activity tolerance, pain, and functional mobility. 
Physical Therapy  Facility/Department: [facility] C2 CARD TELEMETRY  Daily Treatment Note  NAME: Alex Isaac  : 1985  MRN: [id number]    Date of Service: 2024    Discharge Recommendations:  IP Rehab   PT Equipment Recommendations  Equipment Needed: No  Other: Pt already has walker and w/c for home use, likely no other DME will be needed    Therapy discharge recommendations are subject to collaboration from the patient’s interdisciplinary healthcare team, including MD and case management recommendations.    Barriers to Home Discharge:   [x] Steps to access home entry or bed/bath: Pt lives in 2-story home with niece   [x] Unable to transfer, ambulate, or propel wheelchair household distances without assist   [x] Limited available assist at home upon discharge (pt lives with niece, although she is likely unable to provide level of physical assist pt currently requires -- which is maxA x 2)   [] Patient or family requests d/c to post-acute facility    [] Poor cognition/safety awareness for d/c to home alone    [] Unable to maintain ordered weight bearing status    [] Patient with salient signs of long-standing immobility   [x] Decreased independence with ADLs   [x] Increased risk for falls    If pt is unable to be seen after this session, please let this note serve as discharge summary.  Please see case management note for discharge disposition.  Thank you.    Patient Diagnosis(es): There were no encounter diagnoses.    Assessment   Assessment: Pt participated well with PT today despite continued high amounts of post-surgical pain in lower abdomen/groin and increased discomfort from scrotal swelling.  Pt seen in conjunction with OT for co-treat session in light of current assist requirements, in order to safely progress functional mobility.  Pt requiring use of Zayda-Stedy and maxA x 2 for sit<>stand transfers.  Pt too weak and in too much pain to attempt transfers without Zayda-Stedy or ambulate.  Despite pain, 
Pt brought to PACU. Report obtained from cath lab RN and anesthesia. Pt placed on monitor SR and O2 at RA. Right groin venous access C/D/I, arterial sheath in place.   
Pt seen by physical therapy.    Unable to ambulate out of bed  Complains of \"stiffness\" and swelling.  Following   
Pt stating pain throughout right abdomen and right femoral site.  Received order from Dr. Mckeon for stat CT and CBC.  Both completed and pt is back in cath lab.  Right groin/ femoral site is soft but tender.  Dry sterile dressing is in place.  Awaiting results of CT and CBC  
Report to [facility] ICU RN. Taking pt for stat ct w/contrast then move to ICU.  Family has been updated  
Right radial art line removed, not correlating with cuff, patient c/o pain at insertion site, wave form on monitor dampened, catheter was intact on removal, pressure to insertion site held for 10 mins, no bleeding or hematoma at insertion site. Patient states pain at the site is gone now. Dressed with 3 2x2's and a transparent dressing.   
Right radial art line removed, not correlating with cuff, patient c/o pain at insertion site, wave form on monitor dampened, catheter was intact on removal, pressure to insertion site held for 10 mins, no bleeding or hematoma at insertion site. Patient states pain at the site is gone now. Dressed with 3 2x2's and a transparent dressing.   
Vascular    Reports feeling better.  Denies significant abdominal pain.  Groin incision intact without drainage.   'Palpable PT pulse  Hgb slightly decreased to 8.4    I do not believe there is any ongoing active bled from Iliac artery.  Decreased Hgb likely secondary to initial blood loss, dilution, and continued phlebotomy, however will obtain CT with IV contrast to confirm.    
Vascular f/u    CT images personally reviewed.  No active bleeding from arterial repair site.    See prior note.  Transfuse as indicated.    
arrhythmia    Status post ablation of ventricular tachycardia:   He did undergo this procedure on 4/17/24 and tolerated well.  He will continue on amiodarone,Mexilitine.   He was also on metoprolol but that is now being held.  Follow on telemetry.  Further management per cardiology  He does have a AICD in place.     Retroperitoneal bleed : post procedure he did complain of right abdominal and right scrotal pain.  CT scan abdomen pelvis without contrast was performed.  This did reveal a large retroperitoneal hematoma on the right side which extended into the right scrotal sac.  Hb/HCT pre procedure= 15/44.7  Postprocedure= 12.2/36.7      He was transfused with  PRBCs.  Continue to follow H/H and transfuse if needed.  Vascular surgery  consulted and he did go on 4/16/2024 for surgical repair of the right iliac artery.    He was on oral anticoagulation with Eliquis prior to this admission.  This was stopped 2 days prior to the procedure and is now resumed.  Repeat CT scan abdomen pelvis was done on 4/20/2024.  This revealed that there is no active bleeding from the arterial repair site.     Paroxysmal atrial fibrillation.  History is noted, he was maintained on beta-blocker and amiodarone and anticoagulation with Eliquis.   He is currently on amiodarone, mexiletine and Eliquis.  Will continue to follow on telemetry.     Nonischemic cardiomyopathy.  EF was less than 20%.  Continue to monitor closely and follow volume status.   continue  Lasix.  Resume Entresto when okay with cardiology.        Muscular dystrophy familial type: . Patient dependent on wheelchair at home.   He has been seen by PT and OT.  He is recommended for inpatient rehab.  He is reluctant to go to inpatient rehab and would rather be discharged home.  I did discuss this with him and recommended he go to rehab however he is not interested in this.       Physical Exam Performed:      General appearance: He is lying in bed, in no acute distress,   He is 
limited by pain;Patient limited by endurance  Discharge Recommendations: IP Rehab      Plan   Occupational Therapy Plan  Times Per Week: 3-5x  Current Treatment Recommendations: Balance training;Functional mobility training;Endurance training;Safety education & training;Self-Care / ADL     Restrictions  Restrictions/Precautions  Restrictions/Precautions: Fall Risk;General Precautions  Required Braces or Orthoses?: No  Position Activity Restriction  Other position/activity restrictions: activity as tolerated, telemetry with ICU monitoring, Jeffery, lower abdominal/groin incision    Subjective   Subjective  Subjective: Pt agreeable to OT. He was in bathroom upon arrival. RN had assisted pt with Stedy. Therapy then assisted pt out of bathroom w/Stedy.  Pain: Reports lower back and R groin pain--He was positioned in chair for comfort.  Orientation  Overall Orientation Status: Within Functional Limits  Pain: Pt c/o 10/10 pain in scrotum and lower abdomen with movement, 3/10 after repositioning for comfort in chair.  Cognition  Overall Cognitive Status: [facility]    Objective    Vitals   Vitals:    04/18/24    BP: 96/61   Pulse: 60   Resp: 16   Temp: 98.8 °F (37.1 °C)   SpO2: 98%      Bed Mobility Training  Bed Mobility Training: No (pt OOB before & after therapy session (pt reports it took 2 nurses to help him OOB earlier))  Balance  Sitting: Impaired  Sitting - Static: Fair (occasional)  Sitting - Dynamic: Poor (constant support)  Standing: Impaired  Standing - Static: Poor;Constant support  Standing - Dynamic: Poor;Constant support  Transfer Training  Interventions: Safety awareness training;Verbal cues;Visual cues;Tactile cues  Sit to Stand: Maximum assistance;Assist X2;Adaptive equipment (maxA x 2 using Zayda-Stedy, from toilet to Zayda-Stedy, pt only able to stand upright ~50-75% (pt states this is how far he can stand at baseline))  Stand to Sit: Maximum assistance;Assist X2;Adaptive equipment (maxA x 2 using 
  ----------------------------------------------------------------------  C. Data (any 2)  [] Discussed current management and discharge planning options with Case Management.  [] Discussed management of the case with:    [] Telemetry personally reviewed and interpreted as documented above    [] Imaging personally reviewed and interpreted, includes:    [] Data Review (any 3)  [] Collateral history obtained from:    [x] All available Consultant notes from yesterday/today were reviewed  [x] All current labs were reviewed and interpreted for clinical significance   [x] Appropriate follow-up labs were ordered    Medications:  Personally reviewed in detail in conjunction w/ labs as documented for evidence of drug toxicity.     Infusion Medications    sodium chloride      sodium chloride      sodium chloride       Scheduled Medications    albumin human 25%  25 g IntraVENous Q6H    furosemide  20 mg IntraVENous Daily    apixaban  5 mg Oral BID    mupirocin   Each Nostril BID    sodium chloride flush  5-40 mL IntraVENous 2 times per day    amiodarone  200 mg Oral Daily    [Held by provider] metoprolol succinate  100 mg Oral Daily    mexiletine  200 mg Oral BID    pantoprazole  40 mg Oral QAM AC    [Held by provider] sacubitril-valsartan  1.5 tablet Oral BID     PRN Meds: sodium chloride flush, sodium chloride, sodium chloride flush, sodium chloride, acetaminophen, sodium chloride, sodium chloride flush, polyethylene glycol, oxyCODONE **OR** oxyCODONE, morphine **OR** morphine     Labs:  Personally reviewed and interpreted for clinical significance.     Recent Labs     04/16/24 2117 04/17/24  0439 04/18/24  0500   WBC 17.9* 12.3* 10.5   HGB 13.4* 11.0* 9.9*   HCT 40.9 32.8* 29.2*    150 143     Recent Labs     04/16/24  0712 04/17/24  0439 04/18/24  0500    138 137   K 3.9 4.0  4.0 3.9    106 103   CO2 27 22 26   BUN 27* 19 20   CREATININE 1.6* 0.9 0.9   CALCIUM 9.3 8.2* 8.3   MG  --  2.20 2.00     No 
  However, there remains a severe apical, distal inferior, and distal   inferoseptal defect. Cannot exclude prior infarction vs pacing artifact.   4. Enlarged left ventricle chamber size with reduced systolic function,   LVEF 33%.      Cardiac MRI (2021)    LV dilation with moderate systolic dysfunction, diffuse interstitial expansion and prominent non-ischemic   fibrosis. 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  Lab Review     Renal Profile:   Lab Results   Component Value Date/Time    CREATININE 0.9 04/17/2024 04:39 AM    BUN 19 04/17/2024 04:39 AM     04/17/2024 04:39 AM    K 4.0 04/17/2024 04:39 AM    K 4.0 04/17/2024 04:39 AM     04/17/2024 04:39 AM    CO2 22 04/17/2024 04:39 AM     CBC:    Lab Results   Component Value Date/Time    WBC 12.3 04/17/2024 04:39 AM    RBC 3.72 04/17/2024 04:39 AM    HGB 11.0 04/17/2024 04:39 AM    HCT 32.8 04/17/2024 04:39 AM    MCV 88.3 04/17/2024 04:39 AM    RDW 14.5 04/17/2024 04:39 AM     04/17/2024 04:39 AM     BNP:  No results found for: \"BNP\"  Fasting Lipid Panel:  No results found for: \"CHOL\", \"HDL\", \"TRIG\"  Cardiac Enzymes:  CK/MbTroponinNo results found for: \"CKTOTAL\", \"CKMB\", \"CKMBINDEX\", \"TROPONINI\"  PT/ INR   Lab Results   Component Value Date/Time    INR 1.26 02/05/2024 11:53 AM    INR 1.86 08/02/2023 06:15 AM    INR 1.35 08/01/2023 03:07 AM    PROTIME 15.8 02/05/2024 11:53 AM    PROTIME 21.4 08/02/2023 06:15 AM    PROTIME 16.6 08/01/2023 03:07 AM     PTT No results found for: \"PTT\"   Lab Results   Component Value Date/Time    MG 2.20 04/17/2024 04:39 AM    No results found for: \"TSH\"    Assessment:  Ventricular tachycardia/ICD shock              -amiodarone initiated 11/26/2023  Nonischemic cardiomyopathy              -BiV ICD [employer] upgrade-11/28/2023              -Entresto- and BB  Paroxysmal atrial fibrillation        
inferior, inferoseptal,   and apical segments. This partially resolves with CT attenuation   correction, suggesting component of diaphragm attenuation artifact.   However, there remains a severe apical, distal inferior, and distal   inferoseptal defect. Cannot exclude prior infarction vs pacing artifact.   4. Enlarged left ventricle chamber size with reduced systolic function,   LVEF 33%.      Cardiac MRI (2021)    LV dilation with moderate systolic dysfunction, diffuse interstitial expansion and prominent non-ischemic   fibrosis. 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  Lab Review     Renal Profile:   Lab Results   Component Value Date/Time    CREATININE 0.8 04/22/2024 04:30 AM    BUN 25 04/22/2024 04:30 AM     04/22/2024 04:30 AM    K 4.2 04/22/2024 04:30 AM    K 4.0 04/17/2024 04:39 AM    CL 99 04/22/2024 04:30 AM    CO2 23 04/22/2024 04:30 AM     CBC:    Lab Results   Component Value Date/Time    WBC 9.0 04/22/2024 04:30 AM    RBC 3.17 04/22/2024 04:30 AM    HGB 9.5 04/22/2024 04:30 AM    HCT 28.4 04/22/2024 04:30 AM    MCV 89.6 04/22/2024 04:30 AM    RDW 13.9 04/22/2024 04:30 AM     04/22/2024 04:30 AM     BNP:  No results found for: \"BNP\"  Fasting Lipid Panel:  No results found for: \"CHOL\", \"HDL\", \"TRIG\"  Cardiac Enzymes:  CK/MbTroponinNo results found for: \"CKTOTAL\", \"CKMB\", \"CKMBINDEX\", \"TROPONINI\"  PT/ INR   Lab Results   Component Value Date/Time    INR 1.26 02/05/2024 11:53 AM    INR 1.86 08/02/2023 06:15 AM    INR 1.35 08/01/2023 03:07 AM    PROTIME 15.8 02/05/2024 11:53 AM    PROTIME 21.4 08/02/2023 06:15 AM    PROTIME 16.6 08/01/2023 03:07 AM     PTT No results found for: \"PTT\"   Lab Results   Component Value Date/Time    MG 2.10 04/22/2024 04:30 AM    No results found for: \"TSH\"    Assessment:  Ventricular tachycardia: ongoing but stable, brief NSVT on 
PROTIME 16.6 08/01/2023 03:07 AM     PTT No results found for: \"PTT\"   Lab Results   Component Value Date/Time    MG 2.00 04/19/2024 04:56 AM    No results found for: \"TSH\"    Assessment:  Ventricular tachycardia/ICD shock              -amiodarone initiated 11/26/2023  Nonischemic cardiomyopathy              -BiV ICD Medtronic upgrade-11/28/2023              -Entresto- and BB  Paroxysmal atrial fibrillation              -[id number] score reduced EF              -Eliquis   Muscular dystrophy  Smoker  History of renal infarct  History of splenic infarct  Right iliac injury and right retroperitoneal bleed  Anemia-hemoglobin 15.0 down to 9.9 this admission      Plan:   1. Continue amiodarone 200 mg daily  2. Continue to monitor for drug toxicity   3.  Continue mexiletine 200 mg twice daily  4.  Continue Toprol- mg daily--- hold today  5.  Restart Eliquis 5mg twice daily for stroke risk reduction  6.  Continue Entresto 24-26 mg 1 and half tablet 2 times a day--hold today  7.  Continue Lasix IV 20 mg daily  7.  Continue to monitor on telemetry; daily labs  8.  Daily weights and strict I's and O's; low-sodium diet  9.  Vascular following-appreciate assistance --- discussed with vascular NP -will consider rescanning tomorrow pending clinical evaluation  10.  Will need follow-up with Dr. Mckeon in 4-6 weeks s/p abaltion  11. Discussed plan with patient and Dr Linda Martinez APRN-CNP  [facility]  [phone]        
results for input(s): \"PROBNP\", \"TROPHS\" in the last 72 hours.  No results for input(s): \"LABA1C\" in the last 72 hours.  Recent Labs     04/17/24  0439   *   *   BILITOT 0.7   ALKPHOS 85     No results for input(s): \"INR\", \"LACTA\", \"TSH\" in the last 72 hours.    Urine Cultures: No results found for: \"LABURIN\"  Blood Cultures:   Lab Results   Component Value Date/Time    BC No Growth after 4 days of incubation. 07/31/2023 12:46 PM     Lab Results   Component Value Date/Time    BLOODCULT2 No Growth after 4 days of incubation. 07/31/2023 12:16 PM     Organism: No results found for: \"ORG\"      LEONARD GRAVES MD   
sacubitril-valsartan  0.5 tablet Oral BID    metoprolol succinate  25 mg Oral Daily    docusate sodium  100 mg Oral BID    apixaban  5 mg Oral BID    sodium chloride flush  5-40 mL IntraVENous 2 times per day    amiodarone  200 mg Oral Daily    mexiletine  200 mg Oral BID    pantoprazole  40 mg Oral QAM AC     PRN Meds: ondansetron, sodium chloride flush, sodium chloride, sodium chloride flush, sodium chloride, acetaminophen, sodium chloride, sodium chloride flush, polyethylene glycol, oxyCODONE **OR** oxyCODONE, morphine **OR** morphine     Labs:  Personally reviewed and interpreted for clinical significance.     Recent Labs     04/20/24 0455 04/21/24  0515 04/22/24  0430   WBC 7.2 7.4 9.0   HGB 8.4* 8.7* 9.5*   HCT 24.4* 25.9* 28.4*    185 204     Recent Labs     04/20/24 0456 04/21/24  0515 04/22/24  0430   * 136 136   K 4.1 4.1 4.2    99 99   CO2 25 26 23   BUN 20 22* 25*   CREATININE 0.8* 0.8* 0.8*   CALCIUM 9.0 8.9 8.9   MG  --  2.20 2.10     No results for input(s): \"PROBNP\", \"TROPHS\" in the last 72 hours.  No results for input(s): \"LABA1C\" in the last 72 hours.  No results for input(s): \"AST\", \"ALT\", \"BILIDIR\", \"BILITOT\", \"ALKPHOS\" in the last 72 hours.  No results for input(s): \"INR\", \"LACTA\", \"TSH\" in the last 72 hours.    Urine Cultures: No results found for: \"LABURIN\"  Blood Cultures:   Lab Results   Component Value Date/Time    BC No Growth after 4 days of incubation. 07/31/2023 12:46 PM     Lab Results   Component Value Date/Time    BLOODCULT2 No Growth after 4 days of incubation. 07/31/2023 12:16 PM     Organism: No results found for: \"ORG\"      Waldo Clifford MD    
but on the unit or floor, reviewing laboratory test results, discussing care with medical staff, and discussing care with family when the patient was unable or incompetent to participate:   Critical Care Time (Minutes) # 35   (Discontinuous)                    LELE SCHULTE MD  [facility]  Pulmonary, Critical Care and Sleep Medicine  Office : [phone]    Please note that some or all of this record was generated using voice recognition software. If there are any questions about the content of this document, please contact the author as some errors in transcription may have occurred.

## 2024-04-22 NOTE — PLAN OF CARE
Problem: Discharge Planning  Goal: Discharge to home or other facility with appropriate resources  Outcome: Progressing  Flowsheets (Taken 4/21/2024 2000)  Discharge to home or other facility with appropriate resources:   Identify barriers to discharge with patient and caregiver   Arrange for needed discharge resources and transportation as appropriate   Identify discharge learning needs (meds, wound care, etc)   Refer to discharge planning if patient needs post-hospital services based on physician order or complex needs related to functional status, cognitive ability or social support system     Problem: Pain  Goal: Verbalizes/displays adequate comfort level or baseline comfort level  Outcome: Progressing  Flowsheets (Taken 4/21/2024 2000)  Verbalizes/displays adequate comfort level or baseline comfort level:   Encourage patient to monitor pain and request assistance   Assess pain using appropriate pain scale   Administer analgesics based on type and severity of pain and evaluate response   Implement non-pharmacological measures as appropriate and evaluate response   Consider cultural and social influences on pain and pain management   Notify Licensed Independent Practitioner if interventions unsuccessful or patient reports new pain     Problem: Skin/Tissue Integrity  Goal: Absence of new skin breakdown  Description: 1.  Monitor for areas of redness and/or skin breakdown  2.  Assess vascular access sites hourly  3.  Every 4-6 hours minimum:  Change oxygen saturation probe site  4.  Every 4-6 hours:  If on nasal continuous positive airway pressure, respiratory therapy assess nares and determine need for appliance change or resting period.  Outcome: Progressing     Problem: Safety - Adult  Goal: Free from fall injury  Outcome: Progressing     Problem: ABCDS Injury Assessment  Goal: Absence of physical injury  Outcome: Progressing     Problem: Cardiovascular - Adult  Goal: Maintains optimal cardiac output and

## 2024-04-22 NOTE — PLAN OF CARE
Problem: Discharge Planning  Goal: Discharge to home or other facility with appropriate resources  4/22/2024 1310 by Nate Gerardo RN  Outcome: Progressing  4/22/2024 0114 by Hardik Sanz RN  Outcome: Progressing  Flowsheets (Taken 4/21/2024 2000)  Discharge to home or other facility with appropriate resources:   Identify barriers to discharge with patient and caregiver   Arrange for needed discharge resources and transportation as appropriate   Identify discharge learning needs (meds, wound care, etc)   Refer to discharge planning if patient needs post-hospital services based on physician order or complex needs related to functional status, cognitive ability or social support system     Problem: Pain  Goal: Verbalizes/displays adequate comfort level or baseline comfort level  4/22/2024 1310 by Nate Gerardo RN  Outcome: Progressing  4/22/2024 0114 by Hardik Sanz RN  Outcome: Progressing  Flowsheets (Taken 4/21/2024 2000)  Verbalizes/displays adequate comfort level or baseline comfort level:   Encourage patient to monitor pain and request assistance   Assess pain using appropriate pain scale   Administer analgesics based on type and severity of pain and evaluate response   Implement non-pharmacological measures as appropriate and evaluate response   Consider cultural and social influences on pain and pain management   Notify Licensed Independent Practitioner if interventions unsuccessful or patient reports new pain     Problem: Skin/Tissue Integrity  Goal: Absence of new skin breakdown  Description: 1.  Monitor for areas of redness and/or skin breakdown  2.  Assess vascular access sites hourly  3.  Every 4-6 hours minimum:  Change oxygen saturation probe site  4.  Every 4-6 hours:  If on nasal continuous positive airway pressure, respiratory therapy assess nares and determine need for appliance change or resting period.  4/22/2024 1310 by Nate Gerardo RN  Outcome: Progressing  4/22/2024 0114 by

## 2024-04-23 NOTE — TELEPHONE ENCOUNTER
Spoke with the patient and he declined to schedule.  Patient states that he will call back if he would like to schedule.

## 2024-04-24 ENCOUNTER — OFFICE VISIT (OUTPATIENT)
Dept: CARDIOLOGY CLINIC | Age: 39
End: 2024-04-24
Payer: MEDICAID

## 2024-04-24 VITALS
BODY MASS INDEX: 23.99 KG/M2 | OXYGEN SATURATION: 96 % | HEIGHT: 65 IN | WEIGHT: 144 LBS | SYSTOLIC BLOOD PRESSURE: 120 MMHG | DIASTOLIC BLOOD PRESSURE: 70 MMHG | HEART RATE: 60 BPM

## 2024-04-24 DIAGNOSIS — I50.22 CHRONIC SYSTOLIC CONGESTIVE HEART FAILURE (HCC): ICD-10-CM

## 2024-04-24 DIAGNOSIS — Q21.12 PFO (PATENT FORAMEN OVALE): ICD-10-CM

## 2024-04-24 DIAGNOSIS — I44.2 CHB (COMPLETE HEART BLOCK) (HCC): ICD-10-CM

## 2024-04-24 DIAGNOSIS — K68.3 RETROPERITONEAL HEMATOMA: ICD-10-CM

## 2024-04-24 DIAGNOSIS — I47.20 VENTRICULAR TACHYCARDIA (HCC): ICD-10-CM

## 2024-04-24 DIAGNOSIS — F17.200 CURRENT SMOKER: ICD-10-CM

## 2024-04-24 DIAGNOSIS — G71.00 MUSCULAR DYSTROPHY (HCC): ICD-10-CM

## 2024-04-24 DIAGNOSIS — I48.11 LONGSTANDING PERSISTENT ATRIAL FIBRILLATION (HCC): ICD-10-CM

## 2024-04-24 DIAGNOSIS — E61.1 IRON DEFICIENCY: ICD-10-CM

## 2024-04-24 DIAGNOSIS — S35.511D: ICD-10-CM

## 2024-04-24 DIAGNOSIS — Z95.810 PRESENCE OF CARDIAC RESYNCHRONIZATION THERAPY DEFIBRILLATOR (CRT-D): ICD-10-CM

## 2024-04-24 DIAGNOSIS — I42.8 NICM (NONISCHEMIC CARDIOMYOPATHY) (HCC): Primary | ICD-10-CM

## 2024-04-24 PROCEDURE — 99214 OFFICE O/P EST MOD 30 MIN: CPT | Performed by: NURSE PRACTITIONER

## 2024-04-24 NOTE — PROGRESS NOTES
[facility]   Cardiac Evaluation    Primary Care Doctor:  Amadou Rodriguez MD    Chief Complaint   Patient presents with    Follow-up    Cardiomyopathy    Hyperlipidemia          Assessment:    1. NICM (nonischemic cardiomyopathy) (HCC)    2. Chronic systolic congestive heart failure (HCC)    3. CHB (complete heart block) (HCC)    4. Ventricular tachycardia (HCC)    5. Presence of cardiac resynchronization therapy defibrillator (CRT-D)    6. Muscular dystrophy (HCC)    7. Longstanding persistent atrial fibrillation (HCC)    8. Current smoker    9. PFO (patent foramen ovale)    10. Injury of right iliac artery, subsequent encounter    11. Retroperitoneal hematoma    12. Iron deficiency          Plan:   Stay on the same dose of furosemide (Lasix) 20 mg once daily  Continue same dose of  Entresto 24/26 mg twice daily  No change in the metoprolol, amiodarone or mexiletine  Stay on the blood thinner Eliquis 5 mg twice daily  Will plan to repeat blood work in about 2-3 weeks  Keep appointment with Dr. Mckeon in May  I will send message to Dr. Rodriguez to arrange physical therapy at home  Also mention this to muscular dystrophy clinic  Follow up with me on 5/22/23 at 1:30 pm       Vitals:    04/24/24 1530   BP: 120/70   Pulse: 60   SpO2: 96%   Weight: 65.3 kg (144 lb)   Height: 1.651 m (5' 5\")       History of Present Illness:   I had the pleasure of seeing Alex Isaac in follow up for recent hospitalization.  Alex Isaac presented to undergo EP study with VT ablation.  He has subsequent right iliac artery injury and required surgical repair.  He developed severe anemia related to bleeding and hematoma.   He is followed here for nonischemic cardiomyopathy, systolic CHF, complete heart block, atrial fibrillation, VT s/p BiV ICD, splenic and renal infarct on anticoagulation, muscular dystrophy, and history of drug use.  His Entresto was held due to hypotension but then resumed at half tablet twice daily on

## 2024-04-24 NOTE — PATIENT INSTRUCTIONS
Stay on the same dose of furosemide (Lasix) 20 mg once daily  Continue same dose of  Entresto 24/26 mg twice daily  No change in the metoprolol, amiodarone or mexiletine  Stay on the blood thinner Eliquis 5 mg twice daily  Will plan to repeat blood work in about 2-3 weeks  Keep appointment with Dr. Mckeon in May  I will send message to Dr. Rodriguez to arrange physical therapy at home  Also mention this to muscular dystrophy clinic  Follow up with me on 5/22/23 at 1:30 pm

## 2024-04-30 LAB
POC ACT LR: 148 SEC
POC ACT LR: 177 SEC
POC ACT LR: 274 SEC
POC ACT LR: 295 SEC
POC ACT LR: 313 SEC
POC ACT LR: 321 SEC
POC ACT LR: 369 SEC
POC ACT LR: 381 SEC
POC ACT LR: 398 SEC
POC ACT LR: >400 SEC

## 2024-05-01 PROCEDURE — 93297 REM INTERROG DEV EVAL ICPMS: CPT | Performed by: NURSE PRACTITIONER

## 2024-05-14 ENCOUNTER — TELEPHONE (OUTPATIENT)
Dept: CARDIOLOGY CLINIC | Age: 39
End: 2024-05-14

## 2024-05-14 NOTE — TELEPHONE ENCOUNTER
Patient has a Medtronic CRT-D with a hx of VT s/p EPS with VT RFA on 4/16/24. Received updated transmission-- noted multiple episodes of NSVT, 1 episode that fell in the VT monitoring zone, and 2 VT episodes treated successfully with ATP, no shock. On toprol, amiodarone, mexiletine. Patient currently has a follow up scheduled with Dr. Mckeon on 5/22/24. Forwarded transmission to Dr. Mckeon, via [facility] for review.

## 2024-05-14 NOTE — TELEPHONE ENCOUNTER
CARELINK shows decreasing thoracic impedance consistent with increasing fluid index.    Please have him take additional furosemide (Lasix) 20 mg to 2 pills daily for next 3 days.   Keep appointment with me next week as planned.   Thank you, Lali

## 2024-05-22 ENCOUNTER — OFFICE VISIT (OUTPATIENT)
Dept: CARDIOLOGY CLINIC | Age: 39
End: 2024-05-22
Payer: MEDICAID

## 2024-05-22 ENCOUNTER — NURSE ONLY (OUTPATIENT)
Dept: CARDIOLOGY CLINIC | Age: 39
End: 2024-05-22
Payer: MEDICAID

## 2024-05-22 VITALS
HEIGHT: 65 IN | OXYGEN SATURATION: 98 % | DIASTOLIC BLOOD PRESSURE: 84 MMHG | SYSTOLIC BLOOD PRESSURE: 120 MMHG | HEART RATE: 61 BPM | BODY MASS INDEX: 23.96 KG/M2

## 2024-05-22 VITALS
DIASTOLIC BLOOD PRESSURE: 84 MMHG | HEIGHT: 65 IN | BODY MASS INDEX: 23.99 KG/M2 | HEART RATE: 61 BPM | SYSTOLIC BLOOD PRESSURE: 120 MMHG | OXYGEN SATURATION: 98 % | WEIGHT: 143.96 LBS

## 2024-05-22 DIAGNOSIS — Z95.810 PRESENCE OF CARDIAC RESYNCHRONIZATION THERAPY DEFIBRILLATOR (CRT-D): ICD-10-CM

## 2024-05-22 DIAGNOSIS — I47.20 VENTRICULAR TACHYCARDIA (HCC): ICD-10-CM

## 2024-05-22 DIAGNOSIS — I42.8 NONISCHEMIC CARDIOMYOPATHY (HCC): Primary | ICD-10-CM

## 2024-05-22 DIAGNOSIS — I44.2 CHB (COMPLETE HEART BLOCK) (HCC): ICD-10-CM

## 2024-05-22 DIAGNOSIS — Z95.0 PRESENCE OF PERMANENT CARDIAC PACEMAKER: ICD-10-CM

## 2024-05-22 DIAGNOSIS — T82.198A INAPPROPRIATE SHOCKS FROM ICD (IMPLANTABLE CARDIOVERTER-DEFIBRILLATOR), INITIAL ENCOUNTER: ICD-10-CM

## 2024-05-22 DIAGNOSIS — G71.00 MUSCULAR DYSTROPHY (HCC): ICD-10-CM

## 2024-05-22 DIAGNOSIS — I50.22 CHRONIC SYSTOLIC CONGESTIVE HEART FAILURE (HCC): ICD-10-CM

## 2024-05-22 DIAGNOSIS — I48.11 LONGSTANDING PERSISTENT ATRIAL FIBRILLATION (HCC): ICD-10-CM

## 2024-05-22 DIAGNOSIS — I48.0 PAF (PAROXYSMAL ATRIAL FIBRILLATION) (HCC): ICD-10-CM

## 2024-05-22 DIAGNOSIS — I42.8 NICM (NONISCHEMIC CARDIOMYOPATHY) (HCC): ICD-10-CM

## 2024-05-22 DIAGNOSIS — Q21.12 PFO (PATENT FORAMEN OVALE): ICD-10-CM

## 2024-05-22 DIAGNOSIS — F17.200 CURRENT SMOKER: ICD-10-CM

## 2024-05-22 DIAGNOSIS — Z95.810 PRESENCE OF CARDIAC RESYNCHRONIZATION THERAPY DEFIBRILLATOR (CRT-D): Primary | ICD-10-CM

## 2024-05-22 DIAGNOSIS — I42.8 NICM (NONISCHEMIC CARDIOMYOPATHY) (HCC): Primary | ICD-10-CM

## 2024-05-22 PROCEDURE — 99214 OFFICE O/P EST MOD 30 MIN: CPT | Performed by: INTERNAL MEDICINE

## 2024-05-22 PROCEDURE — 99214 OFFICE O/P EST MOD 30 MIN: CPT | Performed by: NURSE PRACTITIONER

## 2024-05-22 PROCEDURE — 93284 PRGRMG EVAL IMPLANTABLE DFB: CPT | Performed by: INTERNAL MEDICINE

## 2024-05-22 RX ORDER — GABAPENTIN 300 MG/1
CAPSULE ORAL
COMMUNITY
Start: 2024-05-07

## 2024-05-22 RX ORDER — SPIRONOLACTONE 25 MG/1
12.5 TABLET ORAL DAILY
Qty: 90 TABLET | Refills: 1 | Status: SHIPPED | OUTPATIENT
Start: 2024-05-22

## 2024-05-22 ASSESSMENT — ENCOUNTER SYMPTOMS
HEMATEMESIS: 0
SHORTNESS OF BREATH: 0
WHEEZING: 0
RIGHT EYE: 0
STRIDOR: 0
LEFT EYE: 0
HEMATOCHEZIA: 0

## 2024-05-22 NOTE — PROGRESS NOTES
[facility]   Cardiac Evaluation    Primary Care Doctor:  Amadou Rodriguez MD    Chief Complaint   Patient presents with    Follow-up    Cardiomyopathy        Assessment:    1. NICM (nonischemic cardiomyopathy) (HCC)    2. Chronic systolic congestive heart failure (HCC)    3. Longstanding persistent atrial fibrillation (HCC)    4. CHB (complete heart block) (HCC)    5. Ventricular tachycardia (HCC)    6. Presence of cardiac resynchronization therapy defibrillator (CRT-D)    7. PFO (patent foramen ovale)    8. Muscular dystrophy (HCC)    9. Current smoker        Plan:   Start spironolactone 25 mg, half tablet once daily  Stay on the furosemide (Lasix) 20 mg daily M-F  Have blood work in 10 days after starting new medicine  No change in the Entresto or Toprol  Follow up with Dr. Mckeon today for heart rhythm issues  Okay/ encouraged to follow up with cardiology and pulmonology that specialize with muscular dystrophy  Follow up with me in ~ 6 weeks     Vitals:    05/22/24 1334   BP: 120/84   Pulse: 61   SpO2: 98%   Height: 1.651 m (5' 5\")       Primary Cardiologist: Dr. Shilo Michael / Dr. 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  Since last visit his optivol suggested fluid retention and his Lasix was increased to 40 mg qd X3.     He has felt some palpitations as well as lots of dizziness.     After taking the additional Lasix he felt his breathing and abdominal distention less.   He normally takes Lasix 20 mg daily M-F, none on weekends.   He saw  neurology for his MD.  They want him to see Cardiologist and Pulmonology at  for MD.   He is now getting therapy at home.   His right leg/ groin hematoma is getting better.  Worse if sits too much.

## 2024-05-22 NOTE — PATIENT INSTRUCTIONS
Start spironolactone 25 mg, half tablet once daily  Stay on the furosemide (Lasix) 20 mg daily M-F  Have blood work in 10 days after starting new medicine  No change in the Entresto or Toprol  Follow up with Dr. Mckeon today for heart rhythm issues  Okay/ encouraged to follow up with cardiology and pulmonology that specialize with muscular dystrophy  Follow up with me in ~ 6 weeks

## 2024-05-22 NOTE — PROGRESS NOTES
Device Check    Atrial Fibrillation    Other     Complete heart rate     Tachycardia     VT     HPI    Patient is a pleasant 38 y.o. male who presents for evaluation of non-ischemic cardiomyopathy. The patient has a past medical history of non-ischemic cardiomyopathy, atrial fibrillation, complete heart block (status post dual chamber PPM), ventricular tachycardia, hyperlipidemia, and muscular dystrophy. Of note cardiac MRI several years ago revealed significant cardiomyopathy echo fibrosis which is likely the reason for ventricular arrhythmias (scar based). He was admitted on 11/25/2023 with chest pain. Patient was having runs of wide-complex tachycardia which appeared to be ventricular tachycardia mostly nonsustained. Troponin was elevated, troponin elevation likely due to rapid ventricular arrhythmias.  On 11/28 he underwent BiV ICD upgrade On 11/29 left upper chest hematoma noted. On 12/20/2023 Eliquis was restarted at 5 mg twice daily. He was admitted on 2/5/2024 with complaints of ICD shock. ICD was interrogated which revealed ventricular tachycardia. At the time, he had run out of Amiodarone and had not been taking it. Amiodarone reinitiated. Mexiletine continued.     Office Visit (EP Clinic, 03/04/2024):  He presents to the clinic today for a follow up visit. He reports that he feels well overall. He reports edema in his lower extremities. Patient denies current chest pain, shortness of breath, palpitations, dizziness or syncope. Patient is taking all cardiac medications as prescribed and tolerates them well. He denies any recent issues with bleeding or bruising.      Interval History since last office visit: He was admitted on 4/16/2024 and had an EPS and VT ablation. Post procedure, he developed a right femoral and scrotal hematoma and he had a repair of his right iliac artery. He was also treated for anemia requiring transfusion.     Office Visit ( Clinic, 05/22/2024):  He presents to the clinic today

## 2024-05-22 NOTE — PATIENT INSTRUCTIONS
Plan:     Remote device checks every three months.   Continue taking current cardiac medications as prescribed.   Follow up with me in 3 months.

## 2024-05-29 RX ORDER — AMIODARONE HYDROCHLORIDE 200 MG/1
200 TABLET ORAL DAILY
Qty: 90 TABLET | Refills: 1 | Status: SHIPPED | OUTPATIENT
Start: 2024-05-29 | End: 2024-11-25

## 2024-05-29 NOTE — TELEPHONE ENCOUNTER
Last ov 05/22/2024  Upcoming ov 08/21/2024  CBC & BMP 04/2024  TSH 01/2024  EKG 04/2024  CHEST XRAY 02/2024

## 2024-05-29 NOTE — TELEPHONE ENCOUNTER
Marie in [place] pharmacy is requesting refill of Amiodarone. Preferred pharamcy is [facility] in [place] [phone].    Last ov 05/22/2024 kxa  Next ov 08/21/2024 kxa

## 2024-05-30 ENCOUNTER — TELEPHONE (OUTPATIENT)
Dept: CARDIOLOGY CLINIC | Age: 39
End: 2024-05-30

## 2024-05-30 NOTE — TELEPHONE ENCOUNTER
Patient has a Medtronic CRT-D and was recently seen in clinic with Dr. Mckeon on 5/22/24. Noted on device transmission is ongoing episodes of NSVT and VT successfully treated with rounds of ATP (avg Vrate 170's). Hx of this/ muscular dystrophy/ epicardial involvement. On amiodarone, toprol and mexiletine. Sent updated diagnostic info to Dr. Mckeon, via [facility], for review. Thanks.

## 2024-06-06 ENCOUNTER — PROCEDURE VISIT (OUTPATIENT)
Dept: CARDIOLOGY CLINIC | Age: 39
End: 2024-06-06

## 2024-06-06 ENCOUNTER — APPOINTMENT (OUTPATIENT)
Dept: GENERAL RADIOLOGY | Age: 39
End: 2024-06-06
Payer: MEDICAID

## 2024-06-06 ENCOUNTER — TELEPHONE (OUTPATIENT)
Dept: CARDIOLOGY CLINIC | Age: 39
End: 2024-06-06

## 2024-06-06 ENCOUNTER — HOSPITAL ENCOUNTER (INPATIENT)
Age: 39
LOS: 1 days | Discharge: HOME | End: 2024-06-07
Attending: STUDENT IN AN ORGANIZED HEALTH CARE EDUCATION/TRAINING PROGRAM | Admitting: STUDENT IN AN ORGANIZED HEALTH CARE EDUCATION/TRAINING PROGRAM
Payer: MEDICAID

## 2024-06-06 DIAGNOSIS — Z91.148 NONCOMPLIANCE WITH MEDICATION REGIMEN: ICD-10-CM

## 2024-06-06 DIAGNOSIS — I47.29 NONSUSTAINED VENTRICULAR TACHYCARDIA (HCC): Primary | ICD-10-CM

## 2024-06-06 DIAGNOSIS — Z95.810 PRESENCE OF CARDIAC RESYNCHRONIZATION THERAPY DEFIBRILLATOR (CRT-D): Primary | ICD-10-CM

## 2024-06-06 DIAGNOSIS — R07.9 CHEST PAIN, UNSPECIFIED TYPE: ICD-10-CM

## 2024-06-06 DIAGNOSIS — I42.8 NICM (NONISCHEMIC CARDIOMYOPATHY) (HCC): ICD-10-CM

## 2024-06-06 LAB
ALBUMIN SERPL-MCNC: 4.5 G/DL (ref 3.4–5)
ALBUMIN/GLOB SERPL: 1.6 {RATIO} (ref 1.1–2.2)
ALP SERPL-CCNC: 81 U/L (ref 40–129)
ALT SERPL-CCNC: 65 U/L (ref 10–40)
ANION GAP SERPL CALCULATED.3IONS-SCNC: 10 MMOL/L (ref 3–16)
AST SERPL-CCNC: 33 U/L (ref 15–37)
BASOPHILS # BLD: 0.1 K/UL (ref 0–0.2)
BASOPHILS NFR BLD: 0.9 %
BILIRUB SERPL-MCNC: 0.6 MG/DL (ref 0–1)
BUN SERPL-MCNC: 14 MG/DL (ref 7–20)
CALCIUM SERPL-MCNC: 9.1 MG/DL (ref 8.3–10.6)
CHLORIDE SERPL-SCNC: 103 MMOL/L (ref 99–110)
CO2 SERPL-SCNC: 26 MMOL/L (ref 21–32)
CREAT SERPL-MCNC: 0.9 MG/DL (ref 0.9–1.3)
DEPRECATED RDW RBC AUTO: 14.2 % (ref 12.4–15.4)
EKG ATRIAL RATE: 58 BPM
EKG DIAGNOSIS: NORMAL
EKG Q-T INTERVAL: 472 MS
EKG QRS DURATION: 182 MS
EKG QTC CALCULATION (BAZETT): 472 MS
EKG R AXIS: 210 DEGREES
EKG T AXIS: -35 DEGREES
EKG VENTRICULAR RATE: 60 BPM
EOSINOPHIL # BLD: 0 K/UL (ref 0–0.6)
EOSINOPHIL NFR BLD: 0.7 %
GFR SERPLBLD CREATININE-BSD FMLA CKD-EPI: >90 ML/MIN/{1.73_M2}
GLUCOSE SERPL-MCNC: 120 MG/DL (ref 70–99)
HCT VFR BLD AUTO: 46 % (ref 40.5–52.5)
HGB BLD-MCNC: 15.1 G/DL (ref 13.5–17.5)
LIPASE SERPL-CCNC: 17 U/L (ref 13–60)
LYMPHOCYTES # BLD: 0.8 K/UL (ref 1–5.1)
LYMPHOCYTES NFR BLD: 11.5 %
MCH RBC QN AUTO: 29.7 PG (ref 26–34)
MCHC RBC AUTO-ENTMCNC: 32.8 G/DL (ref 31–36)
MCV RBC AUTO: 90.7 FL (ref 80–100)
MONOCYTES # BLD: 0.3 K/UL (ref 0–1.3)
MONOCYTES NFR BLD: 4.4 %
NEUTROPHILS # BLD: 5.4 K/UL (ref 1.7–7.7)
NEUTROPHILS NFR BLD: 82.5 %
PLATELET # BLD AUTO: 206 K/UL (ref 135–450)
PMV BLD AUTO: 9.3 FL (ref 5–10.5)
POTASSIUM SERPL-SCNC: 3.7 MMOL/L (ref 3.5–5.1)
PROT SERPL-MCNC: 7.3 G/DL (ref 6.4–8.2)
RBC # BLD AUTO: 5.07 M/UL (ref 4.2–5.9)
SODIUM SERPL-SCNC: 139 MMOL/L (ref 136–145)
TROPONIN, HIGH SENSITIVITY: 43 NG/L (ref 0–22)
TROPONIN, HIGH SENSITIVITY: 45 NG/L (ref 0–22)
WBC # BLD AUTO: 6.5 K/UL (ref 4–11)

## 2024-06-06 PROCEDURE — 85025 COMPLETE CBC W/AUTO DIFF WBC: CPT

## 2024-06-06 PROCEDURE — 80151 DRUG ASSAY AMIODARONE: CPT

## 2024-06-06 PROCEDURE — 2580000003 HC RX 258: Performed by: STUDENT IN AN ORGANIZED HEALTH CARE EDUCATION/TRAINING PROGRAM

## 2024-06-06 PROCEDURE — 99291 CRITICAL CARE FIRST HOUR: CPT | Performed by: INTERNAL MEDICINE

## 2024-06-06 PROCEDURE — 6370000000 HC RX 637 (ALT 250 FOR IP): Performed by: STUDENT IN AN ORGANIZED HEALTH CARE EDUCATION/TRAINING PROGRAM

## 2024-06-06 PROCEDURE — 99285 EMERGENCY DEPT VISIT HI MDM: CPT

## 2024-06-06 PROCEDURE — 83690 ASSAY OF LIPASE: CPT

## 2024-06-06 PROCEDURE — 84484 ASSAY OF TROPONIN QUANT: CPT

## 2024-06-06 PROCEDURE — 96374 THER/PROPH/DIAG INJ IV PUSH: CPT

## 2024-06-06 PROCEDURE — 93010 ELECTROCARDIOGRAM REPORT: CPT | Performed by: INTERNAL MEDICINE

## 2024-06-06 PROCEDURE — 71045 X-RAY EXAM CHEST 1 VIEW: CPT

## 2024-06-06 PROCEDURE — 80053 COMPREHEN METABOLIC PANEL: CPT

## 2024-06-06 PROCEDURE — 1200000000 HC SEMI PRIVATE

## 2024-06-06 PROCEDURE — 36415 COLL VENOUS BLD VENIPUNCTURE: CPT

## 2024-06-06 PROCEDURE — 6360000002 HC RX W HCPCS: Performed by: STUDENT IN AN ORGANIZED HEALTH CARE EDUCATION/TRAINING PROGRAM

## 2024-06-06 PROCEDURE — 93005 ELECTROCARDIOGRAM TRACING: CPT | Performed by: STUDENT IN AN ORGANIZED HEALTH CARE EDUCATION/TRAINING PROGRAM

## 2024-06-06 RX ORDER — ACETAMINOPHEN 650 MG/1
650 SUPPOSITORY RECTAL EVERY 6 HOURS PRN
Status: DISCONTINUED | OUTPATIENT
Start: 2024-06-06 | End: 2024-06-07 | Stop reason: HOSPADM

## 2024-06-06 RX ORDER — ACETAMINOPHEN 325 MG/1
650 TABLET ORAL EVERY 6 HOURS PRN
Status: DISCONTINUED | OUTPATIENT
Start: 2024-06-06 | End: 2024-06-07 | Stop reason: HOSPADM

## 2024-06-06 RX ORDER — PANTOPRAZOLE SODIUM 40 MG/1
40 TABLET, DELAYED RELEASE ORAL
Status: DISCONTINUED | OUTPATIENT
Start: 2024-06-07 | End: 2024-06-07 | Stop reason: HOSPADM

## 2024-06-06 RX ORDER — FUROSEMIDE 20 MG/1
20 TABLET ORAL DAILY
Status: DISCONTINUED | OUTPATIENT
Start: 2024-06-07 | End: 2024-06-07 | Stop reason: HOSPADM

## 2024-06-06 RX ORDER — POTASSIUM CHLORIDE 29.8 MG/ML
20 INJECTION INTRAVENOUS PRN
Status: DISCONTINUED | OUTPATIENT
Start: 2024-06-06 | End: 2024-06-07 | Stop reason: HOSPADM

## 2024-06-06 RX ORDER — POLYETHYLENE GLYCOL 3350 17 G/17G
17 POWDER, FOR SOLUTION ORAL DAILY PRN
Status: DISCONTINUED | OUTPATIENT
Start: 2024-06-06 | End: 2024-06-07 | Stop reason: HOSPADM

## 2024-06-06 RX ORDER — ACETAMINOPHEN 325 MG/1
650 TABLET ORAL ONCE
Status: COMPLETED | OUTPATIENT
Start: 2024-06-06 | End: 2024-06-06

## 2024-06-06 RX ORDER — MEXILETINE HYDROCHLORIDE 200 MG/1
200 CAPSULE ORAL 2 TIMES DAILY
Status: DISCONTINUED | OUTPATIENT
Start: 2024-06-06 | End: 2024-06-07 | Stop reason: HOSPADM

## 2024-06-06 RX ORDER — POTASSIUM CHLORIDE 7.45 MG/ML
10 INJECTION INTRAVENOUS PRN
Status: DISCONTINUED | OUTPATIENT
Start: 2024-06-06 | End: 2024-06-07 | Stop reason: HOSPADM

## 2024-06-06 RX ORDER — SODIUM CHLORIDE 9 MG/ML
INJECTION, SOLUTION INTRAVENOUS PRN
Status: DISCONTINUED | OUTPATIENT
Start: 2024-06-06 | End: 2024-06-07 | Stop reason: HOSPADM

## 2024-06-06 RX ORDER — ONDANSETRON 2 MG/ML
4 INJECTION INTRAMUSCULAR; INTRAVENOUS EVERY 6 HOURS PRN
Status: DISCONTINUED | OUTPATIENT
Start: 2024-06-06 | End: 2024-06-07 | Stop reason: HOSPADM

## 2024-06-06 RX ORDER — SODIUM CHLORIDE 0.9 % (FLUSH) 0.9 %
5-40 SYRINGE (ML) INJECTION PRN
Status: DISCONTINUED | OUTPATIENT
Start: 2024-06-06 | End: 2024-06-07 | Stop reason: HOSPADM

## 2024-06-06 RX ORDER — SODIUM CHLORIDE 0.9 % (FLUSH) 0.9 %
5-40 SYRINGE (ML) INJECTION EVERY 12 HOURS SCHEDULED
Status: DISCONTINUED | OUTPATIENT
Start: 2024-06-06 | End: 2024-06-07 | Stop reason: HOSPADM

## 2024-06-06 RX ORDER — MAGNESIUM SULFATE IN WATER 40 MG/ML
2000 INJECTION, SOLUTION INTRAVENOUS PRN
Status: DISCONTINUED | OUTPATIENT
Start: 2024-06-06 | End: 2024-06-07 | Stop reason: HOSPADM

## 2024-06-06 RX ORDER — ONDANSETRON 4 MG/1
4 TABLET, ORALLY DISINTEGRATING ORAL EVERY 8 HOURS PRN
Status: DISCONTINUED | OUTPATIENT
Start: 2024-06-06 | End: 2024-06-07 | Stop reason: HOSPADM

## 2024-06-06 RX ADMIN — SACUBITRIL AND VALSARTAN 1 TABLET: 24; 26 TABLET, FILM COATED ORAL at 21:16

## 2024-06-06 RX ADMIN — ACETAMINOPHEN 650 MG: 325 TABLET ORAL at 21:16

## 2024-06-06 RX ADMIN — AMIODARONE HYDROCHLORIDE 150 MG: 50 INJECTION, SOLUTION INTRAVENOUS at 13:51

## 2024-06-06 RX ADMIN — APIXABAN 5 MG: 5 TABLET, FILM COATED ORAL at 21:16

## 2024-06-06 RX ADMIN — MEXILETINE HYDROCHLORIDE 200 MG: 200 CAPSULE ORAL at 21:16

## 2024-06-06 RX ADMIN — AMIODARONE HYDROCHLORIDE 0.5 MG/MIN: 50 INJECTION, SOLUTION INTRAVENOUS at 21:12

## 2024-06-06 RX ADMIN — AMIODARONE HYDROCHLORIDE 1 MG/MIN: 50 INJECTION, SOLUTION INTRAVENOUS at 14:10

## 2024-06-06 RX ADMIN — ACETAMINOPHEN 650 MG: 325 TABLET ORAL at 15:34

## 2024-06-06 ASSESSMENT — ENCOUNTER SYMPTOMS
WHEEZING: 0
RIGHT EYE: 0
STRIDOR: 0
HEMATEMESIS: 0
LEFT EYE: 0
HEMATOCHEZIA: 0
SHORTNESS OF BREATH: 1

## 2024-06-06 ASSESSMENT — PAIN SCALES - GENERAL
PAINLEVEL_OUTOF10: 1
PAINLEVEL_OUTOF10: 3
PAINLEVEL_OUTOF10: 3
PAINLEVEL_OUTOF10: 0

## 2024-06-06 ASSESSMENT — PAIN - FUNCTIONAL ASSESSMENT: PAIN_FUNCTIONAL_ASSESSMENT: NONE - DENIES PAIN

## 2024-06-06 NOTE — ED PROVIDER NOTES
THIS IS MY JUAN/RESIDENT/MEDICAL STUDENT SUPERVISORY AND SHARED VISIT NOTE:    I personally saw the patient and made/approved the management plan and take responsibility for the patient management.    Labs Reviewed   CBC WITH AUTO DIFFERENTIAL - Abnormal; Notable for the following components:       Result Value    Lymphocytes Absolute 0.8 (*)     All other components within normal limits   COMPREHENSIVE METABOLIC PANEL W/ REFLEX TO MG FOR LOW K - Abnormal; Notable for the following components:    Glucose 120 (*)     ALT 65 (*)     All other components within normal limits   TROPONIN - Abnormal; Notable for the following components:    Troponin, High Sensitivity 45 (*)     All other components within normal limits   TROPONIN - Abnormal; Notable for the following components:    Troponin, High Sensitivity 43 (*)     All other components within normal limits   LIPASE   AMIODARONE LEVEL     XR CHEST PORTABLE   Final Result   1.  No acute cardiopulmonary findings.      2.  Mild cardiomegaly           The Ekg interpreted by me shows  normal pacemaker rhythm with a rate of 60  Axis is   Right axis deviation  QTc is  normal  Intervals and Durations are unremarkable.      ST Segments: nonspecific changes  No significant change from prior EKG dated 4/16/2024    History: Patient is a 38-year-old male, with history of pacemaker and recent cardiac ablation, presenting with concerns for chest pain and elevated heart rate at home.  Was advised to come in by his cardiologist.  Admits to noncompliance with his amiodarone but states he has been compliant with his other medications.  Symptoms started today.  Per chart review, there were episodes of nonsustained V. tach on his pacemaker interrogation.  Denies abdominal pain, nausea or vomiting.  Denies leg pain or leg swelling.  Denies other complaints or concerns.    Exam: Patient appears chronically ill but is in no acute distress.  Heart regular rate and rhythm.  Lungs clear to

## 2024-06-06 NOTE — ED NOTES
Tech at bedside to assess patient's pacemaker. Sustaining wide complex tachycardia on monitor. Pt symptomatic with palpitations and feeling \"tired\". Dr Sanchez requested to come to see patient.

## 2024-06-06 NOTE — ED PROVIDER NOTES
[facility]  ED  EMERGENCY DEPARTMENT ENCOUNTER        Patient Name: Alex Isaac  MRN: [id number]  Birthdate 1985  Date of evaluation: 6/6/2024  PCP: Amadou Rodriguez MD  Note Started: 1:20 PM EDT 6/6/24      CHIEF COMPLAINT  Irregular Heart Beat (Patient reports tachycardia since last Thursday, HR in the 160s. Pt reports he called his cardiologist was instructed to come to ED due to findings on defibrillator. )         HISTORY & PHYSICAL     HISTORY OF PRESENT ILLNESS  History from : Patient    Limitations to history : None    Alex Isaac is a 38 y.o. male  has a past medical history of Muscular dystrophy (HCC)., bivent ICD upgrade 11/2023, who presents to the ED complaining of chest pain.    Pt arrives via EMS at referral of PCP after transmission EKG showed frequent VT/NSVT events.   Pt states since last Thursday he has felt like his heart is \"racing\". The feeling has been tolerable and took some acid reducer pills to try and help as it has helped in the past but only found minimal relief with the pills this time. He endorses non-compliancy with amiodarone last week for three days but has continued to take his eliquis. He presents to [facility] ED today as he states he \"just couldn't take that feeling anymore\" and on the ride over started having some chest pain. He describes the pain as a sharp and squeezing in his epigastrium without radiation. He did not have any associated N/V/diaphoresis.   He underwent ablation 4/16/24 without complication.      No other complaints, modifying factors or associated symptoms.  Nursing Notes were all reviewed and agreed with or any disagreements were addressed in the HPI.    I have reviewed the following from the nursing documentation.    Past Medical History:   Diagnosis Date    Muscular dystrophy (HCC)      Past Surgical History:   Procedure Laterality Date    PACEMAKER INSERTION      VASCULAR SURGERY Right 4/16/2024    REPAIR RIGHT ILIAC ARTERY     No medications on file         REASSESSMENT:  On reassessment, pt remains clinically and hemodynamically stable  Initial trop elevated (45) repeat troponin without significant elevation from intial trop (47)  Mild cardiomegaly observed on chest x-ray without acute changes.   Amiodarone started in ED  Bedside pacemaker assessment showing sustaining wide complex tachycardia on monitor.   Pt feeling tired      Based on results of work-up, I am concerned for further need of cardiology workup in setting of runs of sustaining wide complex tachycardia on monitor in ED.  At this time, do feel the patient requires admission for further work-up and management.  Patient agrees to admission. Discussed the patient with hospital team patient to be admitted to medicine service.      Vitals:    Vitals:    06/06/24 1414 06/06/24 1415 06/06/24 1416 06/06/24 1446   BP:   99/79 102/74   Pulse: 63 63 63 65   Resp: 16 23 28 27   Temp:       TempSrc:       SpO2: 100% 93% 97% 100%   Weight:       Height:         FINAL IMPRESSION      1. Nonsustained ventricular tachycardia (HCC)    2. Chest pain, unspecified type    3. Noncompliance with medication regimen          DISPOSITION/PLAN   Disposition: DISPOSITION Decision To Admit 06/06/2024 03:08:09 PM    Condition: stable     DISCHARGE MEDICATIONS:  Patient was given scripts for the following medications. I counseled patient how to take these medications:  New Prescriptions    No medications on file       DISCONTINUED MEDICATIONS:  Discontinued Medications    No medications on file       FOLLOW UP:  No follow-up provider specified.    Alice Rocha, MS4  [facility]     DISCLAIMER: This chart was created using Dragon dictation software.  Efforts were made by me to ensure accuracy, however some errors may be present due to limitations of this technology and occasionally words are not transcribed correctly.

## 2024-06-06 NOTE — TELEPHONE ENCOUNTER
Called patient to advise he go to the ER via ambulance per [employer]. Patient is already en route by personal vehicle driven by friend.   Transmission shows frequent VT/NSVT events. Presenting EGM shows slow VT falling outside of detection. Reiterated to patient he needs to go to the ER emergently. He v/u. See MURJ for interrogation.

## 2024-06-06 NOTE — CONSULTS
Cardiac Electrophysiology Consult Note      Physician requesting consult: Mary Sanchez MD   Reason for Consult: Ventricular tachycardia  Admission Date: 6/6/2024  Room/Bed:  04/04    Assessment:     1. Ventricular tachycardia/NICM: Patient now presenting with relatively slow and relatively non-sustained episodes of VT. No ICD shocks delivered. Some of his episodes have been successfully treated with anti-tachycardia pacing from his ICD while others fell below the detection rate (some were in the 120's) and hence did not receive any treatment.    Patient previously presented in October 2023 with ventricular tachycardia (mostly nonsustained). There was no hemodynamic instability in general. He was stabilized with amiodarone and subsequently underwent upgrade to CRTD device (secondary prevention). He later had an ICD shock and eventually underwent an attempted VT ablation (one endocardial area found to be participating in VT circuit and ablated but patient had mostly epicardial VT circuit (not ablated).      Of note, he had a cardiac MRI a few years ago which showed significant myocardial fibrosis which is likely the nidus for his ventricular arrhythmias (scar-based). He did have myocardial perfusion imaging few years ago without evidence of ischemia.      Presented in early February 2024 with ICD shock (appeared he may have stopped using his amiodarone due to running out of medication). The appearance of the arrhythmia was that of true VT. Much less likely to have been atrial fibrillation with rapid ventricular response given presence of high degree/complete heart block and the overall cardiac substrate we are dealing with.     Given that the presenting VT episodes are slow and occurring in setting of multiple consecutive missed doses of amiodarone, reasonable to do overnight IV amiodarone loading and resume oral amiodarone and mexiletine (at home doses) and observe how he does.     Additionally, we changed  treatment zones on his ICD to allow ATP therapy at lower rates.     Although OptiVol is elevated, there is no significant evidence of volume overload on examination today. He is on oral diuretics and also on Entresto.      2. Chest pain: likely demand ischemia due to recurrent ventricular arrhythmias. Troponin mildly elevated without upward trend. Prior ischemic evaluation negative. Monitor clinically.     3. Atrial fibrillation: he appeared during the February 2024 presentation to be in persistent atrial fibrillation on his device check. Has been on anticoagulation now with occasional BRBPR possibly hemorrhoidal. With anti-arrhythmic drug therapy, he appears to be mostly in atrial paced rhythm with minimal to no AF burden. Will monitor.      4. Good overall blood pressure control     5. Muscular dystrophy: familial type    Plan:     1. Amiodarone infusion 1 mg/min  2. Continue oral amiodarone and mexiletine  3. Monitor on telemetry  4. ICD programming changes made    Subjective:     History of Present Illness:    Patient is a 38 y.o. male with past medical history significant for muscular dystrophy, non-ischemic cardiomyopathy (severe LV systolic dysfunction, 20%), complete heart block (status post dual chamber PPM, later upgraded to CRTD in 2023) and paroxysmal atrial fibrillation. He also has history of ventricular tachycardia with prior ICD shocks and relatively recent attempted VT ablation.      He presented to ED today after experiencing increasing palpitations over the past 7 or so days. He started having intermittent palpitations around last Thursday and Friday (on and off, relatively short in duration). Things calmed down somewhat over weekend and then started again on Tuesday. He also felt intermittent \"mild\" chest pain described as pressure at times. Felt a bit short of breath when experiencing palpitations. This week the palpitations have been somewhat longer in duration and seemed to be happening almost

## 2024-06-06 NOTE — H&P
V2.0  History and Physical      Name:  Alex Isaac /Age/Sex: 1985  (38 y.o. male)   MRN & CSN:  [id number] & [id number] Encounter Date/Time: 2024 3:52 PM EDT   Location:   PCP: Amadou Rodriguez MD       Hospital Day: 1    Assessment and Plan:   Alex Isaac is a 38 y.o. male with a pmh of NICM, CHB s/p bivent ICD upgrade 2023 upgrade to CRTD then bivent pacer 2023, persistent afib, renal and splenic infarct, CHF systolic, musc dystrophy who presents with concern for NSVT and VT who presents with NSVT (nonsustained ventricular tachycardia) ([facility])    Hospital Problems             Last Modified POA    * (Principal) NSVT (nonsustained ventricular tachycardia) ([facility]) 2024 Yes       Plan:  NSVT with plan observers of meds due to insurance difficulty.  Patient currently in sinus with amiodarone drip.  It was noted in chart the patient might also been on mexiletine, cardiology consult.   History of nonischemic cardiomyopathy and complete heart block status post pacemaker.  Continue home medications including his Entresto, Lasix, Amio.  History of persistent A-fib, continue patient on Eliquis.  Abdominal pain improving spont in the setting of renal and splenic infarcts in the past, close monitoring of patient's labs and vitals.  Continue management of NSVT as above.  No further imaging at this time.    Disposition:   Current Living situation: home  Expected Disposition: Home  Estimated D/C:4 d    Diet ADULT DIET; Regular; 4 carb choices (60 gm/meal)   DVT Prophylaxis [] Lovenox, []  Heparin, [] SCDs, [] Ambulation,  [x] Eliquis, [] Xarelto, [] Coumadin   Code Status Full Code   Surrogate Decision Maker/ POA Niece/son     Personally reviewed Lab Studies and Imaging     Discussed management of the case with ED who recommended NSVT    EKG interpreted personally and results paced rhythm, regular rate    Imaging that was interpreted personally includes chest x-ray and results no acute  to Pay for Housing in the Last Year: No     Number of Places Lived in the Last Year: 3     Unstable Housing in the Last Year: No       Medications:   Medications:    apixaban  5 mg Oral BID    [START ON 6/7/2024] furosemide  20 mg Oral Daily    mexiletine  200 mg Oral BID    [START ON 6/7/2024] pantoprazole  40 mg Oral QAM AC    sacubitril-valsartan  1 tablet Oral BID    sodium chloride flush  5-40 mL IntraVENous 2 times per day      Infusions:    amiodarone 1 mg/min (06/06/24 1410)    Followed by    amiodarone      sodium chloride       PRN Meds: sodium chloride flush, 5-40 mL, PRN  sodium chloride, , PRN  potassium chloride, 20 mEq, PRN   Or  potassium chloride, 10 mEq, PRN  magnesium sulfate, 2,000 mg, PRN  ondansetron, 4 mg, Q8H PRN   Or  ondansetron, 4 mg, Q6H PRN  polyethylene glycol, 17 g, Daily PRN  acetaminophen, 650 mg, Q6H PRN   Or  acetaminophen, 650 mg, Q6H PRN        Labs      CBC:   Recent Labs     06/06/24  1325   WBC 6.5   HGB 15.1        BMP:    Recent Labs     06/06/24  1325      K 3.7      CO2 26   BUN 14   CREATININE 0.9   GLUCOSE 120*     Hepatic:   Recent Labs     06/06/24  1325   AST 33   ALT 65*   BILITOT 0.6   ALKPHOS 81     Lipids: No results found for: \"CHOL\", \"HDL\", \"TRIG\"  Hemoglobin A1C: No results found for: \"LABA1C\"  TSH: No results found for: \"TSH\"  Troponin: No results found for: \"TROPONINT\"  Lactic Acid: No results for input(s): \"LACTA\" in the last 72 hours.  BNP: No results for input(s): \"PROBNP\" in the last 72 hours.  UA:  Lab Results   Component Value Date/Time    NITRU Negative 07/31/2023 11:50 AM    COLORU Yellow 07/31/2023 11:50 AM    PHUR 6.0 11/25/2023 02:38 PM    WBCUA 0-2 07/31/2023 11:50 AM    RBCUA 5-10 07/31/2023 11:50 AM    BACTERIA 1+ 07/31/2023 11:50 AM    CLARITYU Clear 07/31/2023 11:50 AM    LEUKOCYTESUR Negative 07/31/2023 11:50 AM    UROBILINOGEN 1.0 07/31/2023 11:50 AM    BILIRUBINUR SMALL 07/31/2023 11:50 AM    BLOODU SMALL 07/31/2023 11:50

## 2024-06-07 VITALS
RESPIRATION RATE: 16 BRPM | DIASTOLIC BLOOD PRESSURE: 95 MMHG | TEMPERATURE: 98.2 F | BODY MASS INDEX: 23.73 KG/M2 | WEIGHT: 142.42 LBS | HEART RATE: 60 BPM | HEIGHT: 65 IN | SYSTOLIC BLOOD PRESSURE: 117 MMHG | OXYGEN SATURATION: 100 %

## 2024-06-07 LAB
ANION GAP SERPL CALCULATED.3IONS-SCNC: 14 MMOL/L (ref 3–16)
BASOPHILS # BLD: 0.1 K/UL (ref 0–0.2)
BASOPHILS NFR BLD: 1.4 %
BUN SERPL-MCNC: 20 MG/DL (ref 7–20)
CALCIUM SERPL-MCNC: 8.4 MG/DL (ref 8.3–10.6)
CHLORIDE SERPL-SCNC: 101 MMOL/L (ref 99–110)
CO2 SERPL-SCNC: 23 MMOL/L (ref 21–32)
CREAT SERPL-MCNC: 1 MG/DL (ref 0.9–1.3)
DEPRECATED RDW RBC AUTO: 14.5 % (ref 12.4–15.4)
EOSINOPHIL # BLD: 0.1 K/UL (ref 0–0.6)
EOSINOPHIL NFR BLD: 1.9 %
GFR SERPLBLD CREATININE-BSD FMLA CKD-EPI: >90 ML/MIN/{1.73_M2}
GLUCOSE SERPL-MCNC: 115 MG/DL (ref 70–99)
HCT VFR BLD AUTO: 45.7 % (ref 40.5–52.5)
HGB BLD-MCNC: 14.9 G/DL (ref 13.5–17.5)
LYMPHOCYTES # BLD: 1.6 K/UL (ref 1–5.1)
LYMPHOCYTES NFR BLD: 21.2 %
MAGNESIUM SERPL-MCNC: 2.1 MG/DL (ref 1.8–2.4)
MCH RBC QN AUTO: 29.6 PG (ref 26–34)
MCHC RBC AUTO-ENTMCNC: 32.7 G/DL (ref 31–36)
MCV RBC AUTO: 90.6 FL (ref 80–100)
MONOCYTES # BLD: 0.5 K/UL (ref 0–1.3)
MONOCYTES NFR BLD: 7.4 %
NEUTROPHILS # BLD: 5.1 K/UL (ref 1.7–7.7)
NEUTROPHILS NFR BLD: 68.1 %
PLATELET # BLD AUTO: 208 K/UL (ref 135–450)
PMV BLD AUTO: 9.6 FL (ref 5–10.5)
POTASSIUM SERPL-SCNC: 3.7 MMOL/L (ref 3.5–5.1)
RBC # BLD AUTO: 5.05 M/UL (ref 4.2–5.9)
SODIUM SERPL-SCNC: 138 MMOL/L (ref 136–145)
WBC # BLD AUTO: 7.5 K/UL (ref 4–11)

## 2024-06-07 PROCEDURE — 83735 ASSAY OF MAGNESIUM: CPT

## 2024-06-07 PROCEDURE — 6370000000 HC RX 637 (ALT 250 FOR IP): Performed by: STUDENT IN AN ORGANIZED HEALTH CARE EDUCATION/TRAINING PROGRAM

## 2024-06-07 PROCEDURE — 80048 BASIC METABOLIC PNL TOTAL CA: CPT

## 2024-06-07 PROCEDURE — 6370000000 HC RX 637 (ALT 250 FOR IP)

## 2024-06-07 PROCEDURE — 99232 SBSQ HOSP IP/OBS MODERATE 35: CPT

## 2024-06-07 PROCEDURE — 85025 COMPLETE CBC W/AUTO DIFF WBC: CPT

## 2024-06-07 RX ORDER — AMIODARONE HYDROCHLORIDE 400 MG/1
400 TABLET ORAL DAILY
Qty: 7 TABLET | Refills: 0 | Status: SHIPPED | OUTPATIENT
Start: 2024-06-07 | End: 2024-06-14

## 2024-06-07 RX ORDER — AMIODARONE HYDROCHLORIDE 200 MG/1
400 TABLET ORAL DAILY
Status: DISCONTINUED | OUTPATIENT
Start: 2024-06-07 | End: 2024-06-07 | Stop reason: HOSPADM

## 2024-06-07 RX ORDER — AMIODARONE HYDROCHLORIDE 200 MG/1
200 TABLET ORAL DAILY
Qty: 30 TABLET | Refills: 4 | Status: SHIPPED | OUTPATIENT
Start: 2024-06-14

## 2024-06-07 RX ORDER — METOPROLOL SUCCINATE 25 MG/1
25 TABLET, EXTENDED RELEASE ORAL DAILY
Qty: 30 TABLET | Refills: 0 | Status: SHIPPED | OUTPATIENT
Start: 2024-06-07 | End: 2024-07-07

## 2024-06-07 RX ORDER — AMIODARONE HYDROCHLORIDE 200 MG/1
200 TABLET ORAL DAILY
Status: DISCONTINUED | OUTPATIENT
Start: 2024-06-14 | End: 2024-06-07 | Stop reason: HOSPADM

## 2024-06-07 RX ADMIN — SACUBITRIL AND VALSARTAN 1 TABLET: 24; 26 TABLET, FILM COATED ORAL at 08:43

## 2024-06-07 RX ADMIN — AMIODARONE HYDROCHLORIDE 400 MG: 200 TABLET ORAL at 11:31

## 2024-06-07 RX ADMIN — APIXABAN 5 MG: 5 TABLET, FILM COATED ORAL at 08:42

## 2024-06-07 RX ADMIN — PANTOPRAZOLE SODIUM 40 MG: 40 TABLET, DELAYED RELEASE ORAL at 08:42

## 2024-06-07 RX ADMIN — FUROSEMIDE 20 MG: 20 TABLET ORAL at 08:42

## 2024-06-07 RX ADMIN — MEXILETINE HYDROCHLORIDE 200 MG: 200 CAPSULE ORAL at 08:42

## 2024-06-07 NOTE — PLAN OF CARE
Problem: Chronic Conditions and Co-morbidities  Goal: Patient's chronic conditions and co-morbidity symptoms are monitored and maintained or improved  Outcome: Progressing  Note:   CHF Care Plan      Patient's EF (Ejection Fraction) is less than 40%    Heart Failure Medications:  Diuretics:: Furosemide    (One of the following REQUIRED for EF </= 40%/SYSTOLIC FAILURE but MAY be used in EF% >40%/DIASTOLIC FAILURE)        ACE:: None        ARB:: None         ARNI:: Sacubitril/Valsartan-Entresto    (Beta Blockers)  NON- Evidenced Based Beta Blocker (for EF% >40%/DIASTOLIC FAILURE): None    Evidenced Based Beta Blocker::(REQUIRED for EF% <40%/SYSTOLIC FAILURE) None  ...................................................................................................................................................    Failed to redirect to the Timeline version of the [email or web address] SmartLink.      Patient's weights and intake/output reviewed    Daily Weight log at bedside, patient/family participation in use of log: \"yes    Patient's current weight today shows a difference of 3 lbs less than last documented weight.      Intake/Output Summary (Last 24 hours) at 6/7/2024 0938  Last data filed at 6/7/2024 0843  Gross per 24 hour   Intake 600 ml   Output 2100 ml   Net -1500 ml       Education Booklet Provided: yes    Comorbidities Reviewed Yes    Patient has a past medical history of Muscular dystrophy (HCC).     >>For CHF and Comorbidity documentation on Education Time and Topics, please see Education Tab      Pt resting in bed at this time on room air. Pt denies shortness of breath. Pt without lower extremity edema.     Patient and/or Family's stated Goal of Care this Admission: increase activity tolerance, better understand heart failure and disease management, and be more comfortable prior to discharge        :

## 2024-06-07 NOTE — CARE COORDINATION
Spoke with RN who states patient will likely be ready to discharge later this afternoon with no needs from CM.  Patient has insurance and a PCP.

## 2024-06-07 NOTE — PROGRESS NOTES
Hospital Medicine Progress Note      Date of Admission: 6/6/2024  Hospital Day: 2    Chief Admission Complaint:  irreg HB     Subjective:  resting in bed, no new complaints    Presenting Admission History:       \"Alex Isaac is a 38 y.o. male with pmh of NICM, CHB s/p bivent ICD upgrade 11/2023 upgrade to CRTD then bivent pacer 11/2023, persistent afib, renal and splenic infarct, CHF systolic, musc dystrophy who presents with concern for NSVT and VT.     In the ED, originally patient was afebrile 98 Fahrenheit heart rate in the 130s which improved with amiodarone drip, blood pressure 149/92 abdominal 112/75, satting well on room air.  Labs largely unremarkable BMP without any electrolyte derangement, glucose of 120, troponin elevated at 45 and flat at 43.  LFTs largely unremarkable save for AST of 65.  CBC unremarkable, EKG with paced rhythm with a rate of 58, chest x-ray without any acute cardiopulmonary process.     Of note and discussion with patient regarding medications, states that he ran out of medications because he could not afford it/insurance was not covering it.  However he has been back to taking it the last few days.  See cardiology note as well from recent note in May.  Follow     Per cards note 5/2024 Dr. Mckeon  Ventricular tachycardia/ICD shock/NICM: Patient presented in October 2023 with runs of wide-complex tachycardia appearing to represent ventricular tachycardia (mostly nonsustained). There was no hemodynamic instability in general.  He was in a paced rhythm. He was stabilized with amiodarone and subsequently underwent upgrade to CRTD device (secondary prevention).    restarted amiodarone 200 mg daily but he continued to have VT episodes. We ended up adding mexiletine. \"    Assessment/Plan:      Current Principal Problem:  NSVT (nonsustained ventricular tachycardia) (HCC)    NSVT Patient currently in sinus with amiodarone drip.  It was noted in chart the patient might also been on    HCT 46.0 45.7    208     Recent Labs     06/06/24  1325 06/07/24  0456    138   K 3.7 3.7    101   CO2 26 23   BUN 14 20   CREATININE 0.9 1.0   CALCIUM 9.1 8.4   MG  --  2.10     Recent Labs     06/06/24  1325 06/06/24  1410   TROPHS 45* 43*     No results for input(s): \"LABA1C\" in the last 72 hours.  Recent Labs     06/06/24  1325   AST 33   ALT 65*   BILITOT 0.6   ALKPHOS 81     No results for input(s): \"INR\", \"LACTA\", \"TSH\" in the last 72 hours.    Urine Cultures: No results found for: \"LABURIN\"  Blood Cultures:   Lab Results   Component Value Date/Time    BC No Growth after 4 days of incubation. 07/31/2023 12:46 PM     Lab Results   Component Value Date/Time    BLOODCULT2 No Growth after 4 days of incubation. 07/31/2023 12:16 PM     Organism: No results found for: \"ORG\"      Ankit Garcia, APRN - CNP

## 2024-06-07 NOTE — PROGRESS NOTES
amiodarone 400 mg daily x 7 days followed by 200 mg daily  Continue to monitor for drug toxicity   Continue mexiletine 200 mg twice daily  Continue Toprol XL 25 mg daily  Continue Eliquis 5mg twice daily for stroke risk reduction  Continue Entresto 24-26 mg 1 and half tablet 2 times a day  Continue Lasix 20 mg daily  Okay to discharge this afternoon if patient continues with no further episodes of VT  Discussed with patient and nursing    Elisa Martinez APRN-CNP  [facility]  [phone]

## 2024-06-07 NOTE — DISCHARGE SUMMARY
Hospital Medicine Discharge Summary    Patient: Alex Isaac   : 1985     Admit Date: 2024   Discharge Date:   ***  Disposition:  []Home   []HHC  []SNF  []ECF  []Acute Rehab  []LTAC  []Hospice  Code status:  []Full  []DNR/CCA  []Limited (DNR/CCA with Do Not Intubate)  []DNRCC  Condition at Discharge: Stable  Primary Care Provider: Amadou Rodriguez MD    Admitting Provider: Molly Wu MD  Discharge Provider: DEB Best - CNP     Discharge Diagnoses:      Active Hospital Problems    Diagnosis     NSVT (nonsustained ventricular tachycardia) ([facility]) [I47.29]        Presenting Admission History:      ***     Assessment/Plan:      ***    Physical Exam Performed:      BP (!) 117/95   Pulse 60   Temp 98.2 °F (36.8 °C) (Oral)   Resp 16   Ht 1.651 m (5' 5\")   Wt 64.6 kg (142 lb 6.7 oz)   SpO2 100%   BMI 23.70 kg/m²     ***  General appearance:  No apparent distress, appears stated age and cooperative.  Respiratory:  Normal respiratory effort.   Cardiovascular:  Regular rate and rhythm.  Abdomen:  Soft, non-tender, non-distended.  Musculoskeletal:  No edema  Neurologic:  Non-focal  Psychiatric:  Alert and oriented    Patient Discharge Instructions:      Follow up:    1.  Primary Care Provider Amadou Rodriguez MD in the next 1-2 weeks.  ***    The patient was seen and examined on day of discharge and this discharge summary is in conjunction with any daily progress note from day of discharge. Time spent on discharge: 3*** minutes in the examination, evaluation, counseling and review of medications and discharge plan.    ------------------------------------------------------------------------------------------------------------------------------------------------------    Discharge Medications:   Current Discharge Medication List        Current Discharge Medication List        CONTINUE these medications which have CHANGED    Details   !! amiodarone (CORDARONE) 200 MG tablet Take 1 tablet by mouth

## 2024-06-07 NOTE — PROGRESS NOTES
Pt admitted to room 213 from ER. Pt oriented to room and call light as well as unit routine. Telemetry box in place, pt aware of reason. VSS, amiodarone drip infusing.

## 2024-06-07 NOTE — PROGRESS NOTES
Pt d/c'd home with friend in personal car with all belongings.  Removed peripheral IV and stopped bleeding.  Catheter intact. Pt tolerated well. No redness noted at site.  Notified [facility] and removed tele box. Reviewed d/c instructions, home meds, and  f/u information utilizing teach-back method.  Scripts sent to pharmacy. Patient verbalized understanding. Patient taken to main entrance in his own wheelchair.

## 2024-06-07 NOTE — CONSULTS
Nutrition Education    Consult received for CHF diet education. Pt declined need for verbal review, but accepted written instruction on HF nutrition therapy. Handout discusses low sodium diet, daily weights, and fluid restriction.     Educated on CHF diet  Learners: Patient  Readiness: Acceptance  Method: Handout  Response: Verbalizes Understanding  Contact name and number provided.    Zuri Gonzales RD, LD  Contact Number: [phone]

## 2024-06-07 NOTE — ED NOTES
Mr. Isaac is a 38 y.o. male who had concerns including Irregular Heart Beat (Patient reports tachycardia since last Thursday, HR in the 160s. Pt reports he called his cardiologist was instructed to come to ED due to findings on defibrillator. ).    Chief Complaint   Patient presents with    Irregular Heart Beat     Patient reports tachycardia since last Thursday, HR in the 160s. Pt reports he called his cardiologist was instructed to come to ED due to findings on defibrillator.        He is being admitted for:    NSVT (nonsustained ventricular tachycardia) ([facility])    His ED problem list included:    1. Nonsustained ventricular tachycardia (HCC)    2. Chest pain, unspecified type    3. Noncompliance with medication regimen        Past Medical History:   Diagnosis Date    Muscular dystrophy (HCC)        Past Surgical History:   Procedure Laterality Date    PACEMAKER INSERTION      VASCULAR SURGERY Right 4/16/2024    REPAIR RIGHT ILIAC ARTERY performed by Sergio Sanders MD at [facility] OR       His recent abnormal labs were:    Labs Reviewed   CBC WITH AUTO DIFFERENTIAL - Abnormal; Notable for the following components:       Result Value    Lymphocytes Absolute 0.8 (*)     All other components within normal limits   COMPREHENSIVE METABOLIC PANEL W/ REFLEX TO MG FOR LOW K - Abnormal; Notable for the following components:    Glucose 120 (*)     ALT 65 (*)     All other components within normal limits   TROPONIN - Abnormal; Notable for the following components:    Troponin, High Sensitivity 45 (*)     All other components within normal limits   TROPONIN - Abnormal; Notable for the following components:    Troponin, High Sensitivity 43 (*)     All other components within normal limits   LIPASE   AMIODARONE LEVEL   BASIC METABOLIC PANEL W/ REFLEX TO MG FOR LOW K   MAGNESIUM   CBC WITH AUTO DIFFERENTIAL       His vital signs for the encounter were:    Patient Vitals for the past 24 hrs:   BP Temp Temp src Pulse Resp SpO2 Height  impressions:    XR CHEST PORTABLE    Result Date: 6/6/2024  EXAMINATION: ONE XRAY VIEW OF THE CHEST 6/6/2024 1:18 pm COMPARISON: Chest x-ray dated 5 February 2024 HISTORY: ORDERING SYSTEM PROVIDED HISTORY: chest pain TECHNOLOGIST PROVIDED HISTORY: Reason for exam:->chest pain Reason for Exam: chest pain FINDINGS: Mild cardiomegaly.  Left-sided pacemaker device with leads in the right atrium and right ventricle.  No pneumothorax or pleural effusion.  No acute infiltrate.     1.  No acute cardiopulmonary findings. 2.  Mild cardiomegaly

## 2024-06-07 NOTE — PLAN OF CARE
Problem: Pain  Goal: Verbalizes/displays adequate comfort level or baseline comfort level  6/7/2024 0928 by Mauricio Ashford RN  Outcome: Progressing  Note: Pt will be satisfied with pain control. Pt uses numeric pain rating scale with reassessments after pain med administration. Will continue to monitor progression throughout shift.  Problem: Safety - Adult  Goal: Free from fall injury  Outcome: Progressing  Note: Pt will remain free from falls throughout hospital stay. Fall precautions in place, bed alarm on, bed in lowest position with wheels locked and side rails 2/4 up. Call light within reach and hourly rounding completed. Will continue to monitor throughout shift.

## 2024-06-07 NOTE — PLAN OF CARE
Problem: Pain  Goal: Verbalizes/displays adequate comfort level or baseline comfort level  Outcome: Progressing   Continuing to assess pt's pain level and administer PRN medications as appropriate.

## 2024-06-09 LAB
AMIODARONE SERPL-MCNC: 0.5 UG/ML (ref 0.5–2)
DESETHYLAMIODARONE SERPL-MCNC: 0.5 UG/ML

## 2024-06-10 ENCOUNTER — TELEPHONE (OUTPATIENT)
Dept: CARDIOLOGY CLINIC | Age: 39
End: 2024-06-10

## 2024-06-10 NOTE — TELEPHONE ENCOUNTER
Patient was discharged from the hospital a few days ago. Per today's device transmission, patient continues to have brief runs of VT treated with ATP/ no shocks. D/c'd from the hospital on amiodarone loading dose, Toprol, mexiletine. Next follow up with Dr. Mckeon scheduled for 8/21/24. Sending thru updated transmission, via [employer], to Dr. Mckeon for ongoing review/ update. Thanks.

## 2024-06-11 ENCOUNTER — TELEPHONE (OUTPATIENT)
Dept: CARDIOLOGY CLINIC | Age: 39
End: 2024-06-11

## 2024-06-12 NOTE — TELEPHONE ENCOUNTER
Submitted PA for AMIODARONE  Via CMM Key: [id number]  STATUS: APPROVED 6/12/24-6/12/25    Follow up done daily; if no decision with in three days we will refax.  If another three days goes by with no decision will call the insurance for status.

## 2024-07-03 ENCOUNTER — OFFICE VISIT (OUTPATIENT)
Dept: CARDIOLOGY CLINIC | Age: 39
End: 2024-07-03
Payer: MEDICAID

## 2024-07-03 VITALS
DIASTOLIC BLOOD PRESSURE: 64 MMHG | WEIGHT: 150 LBS | BODY MASS INDEX: 24.99 KG/M2 | HEIGHT: 65 IN | SYSTOLIC BLOOD PRESSURE: 118 MMHG | HEART RATE: 60 BPM

## 2024-07-03 DIAGNOSIS — I48.11 LONGSTANDING PERSISTENT ATRIAL FIBRILLATION (HCC): ICD-10-CM

## 2024-07-03 DIAGNOSIS — Q21.12 PFO (PATENT FORAMEN OVALE): ICD-10-CM

## 2024-07-03 DIAGNOSIS — Z95.810 PRESENCE OF CARDIAC RESYNCHRONIZATION THERAPY DEFIBRILLATOR (CRT-D): ICD-10-CM

## 2024-07-03 DIAGNOSIS — I50.22 CHRONIC SYSTOLIC CONGESTIVE HEART FAILURE (HCC): ICD-10-CM

## 2024-07-03 DIAGNOSIS — I47.20 VENTRICULAR TACHYCARDIA (HCC): ICD-10-CM

## 2024-07-03 DIAGNOSIS — I44.2 CHB (COMPLETE HEART BLOCK) (HCC): ICD-10-CM

## 2024-07-03 DIAGNOSIS — G71.00 MUSCULAR DYSTROPHY (HCC): ICD-10-CM

## 2024-07-03 DIAGNOSIS — F17.200 CURRENT SMOKER: ICD-10-CM

## 2024-07-03 DIAGNOSIS — I42.8 NICM (NONISCHEMIC CARDIOMYOPATHY) (HCC): Primary | ICD-10-CM

## 2024-07-03 PROCEDURE — 99214 OFFICE O/P EST MOD 30 MIN: CPT | Performed by: NURSE PRACTITIONER

## 2024-07-03 RX ORDER — SPIRONOLACTONE 25 MG/1
TABLET ORAL
Qty: 90 TABLET | Refills: 1
Start: 2024-07-03

## 2024-07-03 NOTE — PATIENT INSTRUCTIONS
Restart the spironolactone 25 mg, whole tablet twice weekly (Monday and Thursday)  Stay on same doses of Entresto, metoprolol, furosemide  Continue the Amiodarone, Mexilitine and Eliquis per Elisa Martinez CNP and Dr. Mckeon  Follow up with a dentist and see if there is anything else causing gum/ jaw pain  No testing/ treatment warranted at this time for abdominal / ventral hernia.  Let us or PCP know become red or painful all the time  Follow up with me in 6 weeks

## 2024-07-03 NOTE — PROGRESS NOTES
[facility]   Cardiac Evaluation    Primary Care Doctor:  Amadou Rodriguez MD    Chief Complaint   Patient presents with    Follow-up    Hyperlipidemia    NICM (nonischemic cardiomyopathy) (HCC)        Assessment:    1. NICM (nonischemic cardiomyopathy) (HCC)    2. Chronic systolic congestive heart failure (HCC)    3. Longstanding persistent atrial fibrillation (HCC)    4. CHB (complete heart block) (HCC)    5. Presence of cardiac resynchronization therapy defibrillator (CRT-D)    6. Ventricular tachycardia (HCC)    7. PFO (patent foramen ovale)    8. Muscular dystrophy (HCC)    9. Current smoker        Plan:   Restart the spironolactone 25 mg, whole tablet twice weekly (Monday and Thursday)  Stay on same doses of Entresto, metoprolol, furosemide  Continue the Amiodarone, Mexilitine and Eliquis per Elisa Martinez CNP and Dr. Mckeon  Follow up with a dentist and see if there is anything else causing gum/ jaw pain  No testing/ treatment warranted at this time for abdominal / ventral hernia.  Let us or PCP know become red or painful all the time  Follow up with me in 6 weeks     Vitals:    07/03/24 1425   BP: 118/64   Pulse: 60   Weight: 68 kg (150 lb)   Height: 1.651 m (5' 5\")       Primary Cardiologist: Dr. Shilo Michael / Dr. 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  In May we added spironolactone 12.5 mg daily and kept his Lasix at 20 mg Monday through Friday.  He was then admitted to the hospital with recurrent slow and nonsustained VT.  He had missed 3 doses of his amiodarone.  He underwent reloading with IV amiodarone and sent home on higher dose for 1 week then back to 200 mg daily. The spironolactone was stopped at discharge - unclear why.

## 2024-08-22 RX ORDER — METOPROLOL SUCCINATE 25 MG/1
25 TABLET, EXTENDED RELEASE ORAL DAILY
Qty: 90 TABLET | Refills: 3 | Status: SHIPPED | OUTPATIENT
Start: 2024-08-22 | End: 2025-08-17

## 2024-08-22 RX ORDER — FUROSEMIDE 20 MG/1
20 TABLET ORAL DAILY
Qty: 90 TABLET | Refills: 3 | Status: SHIPPED | OUTPATIENT
Start: 2024-08-22

## 2024-08-22 NOTE — TELEPHONE ENCOUNTER
Pt called and stated that he needed refill on Metoprolol Succinate 25mg and Furosemide 20mg.  Please send refills to     [facility] [id number] - [place], [place] - [address] - P [phone] - F [phone]  [address] [address]  Phone: [phone]  Fax: [phone]     LOV with NPDD 7/3/24

## 2024-09-16 ENCOUNTER — HOSPITAL ENCOUNTER (OUTPATIENT)
Age: 39
Discharge: HOME | End: 2024-09-16
Payer: MEDICAID

## 2024-09-16 ENCOUNTER — HOSPITAL ENCOUNTER (OUTPATIENT)
Dept: GENERAL RADIOLOGY | Age: 39
Discharge: HOME | End: 2024-09-16
Payer: MEDICAID

## 2024-09-16 DIAGNOSIS — R10.84 GENERALIZED ABDOMINAL PAIN: ICD-10-CM

## 2024-09-16 PROCEDURE — 74018 RADEX ABDOMEN 1 VIEW: CPT

## 2024-09-23 ENCOUNTER — APPOINTMENT (OUTPATIENT)
Dept: CT IMAGING | Age: 39
End: 2024-09-23
Payer: MEDICAID

## 2024-09-23 ENCOUNTER — HOSPITAL ENCOUNTER (OUTPATIENT)
Age: 39
Setting detail: OBSERVATION
Discharge: HOME | End: 2024-09-26
Attending: EMERGENCY MEDICINE | Admitting: INTERNAL MEDICINE
Payer: MEDICAID

## 2024-09-23 ENCOUNTER — APPOINTMENT (OUTPATIENT)
Dept: GENERAL RADIOLOGY | Age: 39
End: 2024-09-23
Payer: MEDICAID

## 2024-09-23 ENCOUNTER — TELEPHONE (OUTPATIENT)
Dept: CARDIOLOGY CLINIC | Age: 39
End: 2024-09-23

## 2024-09-23 DIAGNOSIS — K57.32 DIVERTICULITIS OF COLON: ICD-10-CM

## 2024-09-23 DIAGNOSIS — E87.70 HYPERVOLEMIA, UNSPECIFIED HYPERVOLEMIA TYPE: Primary | ICD-10-CM

## 2024-09-23 PROBLEM — I5A NON-ISCHEMIC MYOCARDIAL INJURY (NON-TRAUMATIC): Status: ACTIVE | Noted: 2023-09-01

## 2024-09-23 PROBLEM — K57.92 ACUTE DIVERTICULITIS: Status: ACTIVE | Noted: 2024-09-23

## 2024-09-23 PROBLEM — I50.22 CHRONIC HFREF (HEART FAILURE WITH REDUCED EJECTION FRACTION) (HCC): Status: ACTIVE | Noted: 2024-09-23

## 2024-09-23 LAB
ALBUMIN SERPL-MCNC: 4.2 G/DL (ref 3.4–5)
ALBUMIN/GLOB SERPL: 1.5 {RATIO} (ref 1.1–2.2)
ALP SERPL-CCNC: 110 U/L (ref 40–129)
ALT SERPL-CCNC: 55 U/L (ref 10–40)
ANION GAP SERPL CALCULATED.3IONS-SCNC: 13 MMOL/L (ref 3–16)
AST SERPL-CCNC: 45 U/L (ref 15–37)
BASOPHILS # BLD: 0.1 K/UL (ref 0–0.2)
BASOPHILS NFR BLD: 0.7 %
BILIRUB SERPL-MCNC: 1.1 MG/DL (ref 0–1)
BUN SERPL-MCNC: 23 MG/DL (ref 7–20)
CALCIUM SERPL-MCNC: 8.9 MG/DL (ref 8.3–10.6)
CHLORIDE SERPL-SCNC: 99 MMOL/L (ref 99–110)
CO2 SERPL-SCNC: 24 MMOL/L (ref 21–32)
CREAT SERPL-MCNC: 1 MG/DL (ref 0.9–1.3)
DEPRECATED RDW RBC AUTO: 15.4 % (ref 12.4–15.4)
EOSINOPHIL # BLD: 0.1 K/UL (ref 0–0.6)
EOSINOPHIL NFR BLD: 1 %
GFR SERPLBLD CREATININE-BSD FMLA CKD-EPI: >90 ML/MIN/{1.73_M2}
GLUCOSE SERPL-MCNC: 112 MG/DL (ref 70–99)
HCT VFR BLD AUTO: 41 % (ref 40.5–52.5)
HGB BLD-MCNC: 13.7 G/DL (ref 13.5–17.5)
LIPASE SERPL-CCNC: 60 U/L (ref 13–60)
LYMPHOCYTES # BLD: 1.8 K/UL (ref 1–5.1)
LYMPHOCYTES NFR BLD: 15.6 %
MCH RBC QN AUTO: 29.6 PG (ref 26–34)
MCHC RBC AUTO-ENTMCNC: 33.4 G/DL (ref 31–36)
MCV RBC AUTO: 88.6 FL (ref 80–100)
MONOCYTES # BLD: 0.9 K/UL (ref 0–1.3)
MONOCYTES NFR BLD: 7.5 %
NEUTROPHILS # BLD: 8.6 K/UL (ref 1.7–7.7)
NEUTROPHILS NFR BLD: 75.2 %
NT-PROBNP SERPL-MCNC: 4720 PG/ML (ref 0–124)
PLATELET # BLD AUTO: 199 K/UL (ref 135–450)
PMV BLD AUTO: 8.5 FL (ref 5–10.5)
POTASSIUM SERPL-SCNC: 3.9 MMOL/L (ref 3.5–5.1)
PROT SERPL-MCNC: 7 G/DL (ref 6.4–8.2)
RBC # BLD AUTO: 4.63 M/UL (ref 4.2–5.9)
SODIUM SERPL-SCNC: 136 MMOL/L (ref 136–145)
TROPONIN, HIGH SENSITIVITY: 45 NG/L (ref 0–22)
TROPONIN, HIGH SENSITIVITY: 56 NG/L (ref 0–22)
WBC # BLD AUTO: 11.5 K/UL (ref 4–11)

## 2024-09-23 PROCEDURE — 96365 THER/PROPH/DIAG IV INF INIT: CPT

## 2024-09-23 PROCEDURE — 71260 CT THORAX DX C+: CPT

## 2024-09-23 PROCEDURE — 1200000000 HC SEMI PRIVATE

## 2024-09-23 PROCEDURE — 6360000002 HC RX W HCPCS: Performed by: NURSE PRACTITIONER

## 2024-09-23 PROCEDURE — 80053 COMPREHEN METABOLIC PANEL: CPT

## 2024-09-23 PROCEDURE — 96375 TX/PRO/DX INJ NEW DRUG ADDON: CPT

## 2024-09-23 PROCEDURE — 83690 ASSAY OF LIPASE: CPT

## 2024-09-23 PROCEDURE — 71045 X-RAY EXAM CHEST 1 VIEW: CPT

## 2024-09-23 PROCEDURE — 6360000002 HC RX W HCPCS: Performed by: EMERGENCY MEDICINE

## 2024-09-23 PROCEDURE — 36415 COLL VENOUS BLD VENIPUNCTURE: CPT

## 2024-09-23 PROCEDURE — 85025 COMPLETE CBC W/AUTO DIFF WBC: CPT

## 2024-09-23 PROCEDURE — 83880 ASSAY OF NATRIURETIC PEPTIDE: CPT

## 2024-09-23 PROCEDURE — 6360000004 HC RX CONTRAST MEDICATION: Performed by: NURSE PRACTITIONER

## 2024-09-23 PROCEDURE — 84484 ASSAY OF TROPONIN QUANT: CPT

## 2024-09-23 PROCEDURE — 2580000003 HC RX 258: Performed by: NURSE PRACTITIONER

## 2024-09-23 PROCEDURE — 99285 EMERGENCY DEPT VISIT HI MDM: CPT

## 2024-09-23 RX ORDER — MEXILETINE HYDROCHLORIDE 200 MG/1
200 CAPSULE ORAL 2 TIMES DAILY
Status: DISCONTINUED | OUTPATIENT
Start: 2024-09-24 | End: 2024-09-26 | Stop reason: HOSPADM

## 2024-09-23 RX ORDER — PANTOPRAZOLE SODIUM 40 MG/1
40 TABLET, DELAYED RELEASE ORAL
Status: DISCONTINUED | OUTPATIENT
Start: 2024-09-24 | End: 2024-09-26 | Stop reason: HOSPADM

## 2024-09-23 RX ORDER — SODIUM CHLORIDE 0.9 % (FLUSH) 0.9 %
5-40 SYRINGE (ML) INJECTION PRN
Status: DISCONTINUED | OUTPATIENT
Start: 2024-09-23 | End: 2024-09-26 | Stop reason: HOSPADM

## 2024-09-23 RX ORDER — MAGNESIUM SULFATE IN WATER 40 MG/ML
2000 INJECTION, SOLUTION INTRAVENOUS PRN
Status: DISCONTINUED | OUTPATIENT
Start: 2024-09-23 | End: 2024-09-26 | Stop reason: HOSPADM

## 2024-09-23 RX ORDER — ONDANSETRON 2 MG/ML
4 INJECTION INTRAMUSCULAR; INTRAVENOUS ONCE
Status: COMPLETED | OUTPATIENT
Start: 2024-09-23 | End: 2024-09-23

## 2024-09-23 RX ORDER — ONDANSETRON 2 MG/ML
4 INJECTION INTRAMUSCULAR; INTRAVENOUS EVERY 6 HOURS PRN
Status: DISCONTINUED | OUTPATIENT
Start: 2024-09-23 | End: 2024-09-26 | Stop reason: HOSPADM

## 2024-09-23 RX ORDER — FUROSEMIDE 20 MG
20 TABLET ORAL DAILY
Status: DISCONTINUED | OUTPATIENT
Start: 2024-09-24 | End: 2024-09-26 | Stop reason: HOSPADM

## 2024-09-23 RX ORDER — SPIRONOLACTONE 25 MG/1
25 TABLET ORAL
Status: DISCONTINUED | OUTPATIENT
Start: 2024-09-26 | End: 2024-09-26 | Stop reason: HOSPADM

## 2024-09-23 RX ORDER — SODIUM CHLORIDE 0.9 % (FLUSH) 0.9 %
5-40 SYRINGE (ML) INJECTION EVERY 12 HOURS SCHEDULED
Status: DISCONTINUED | OUTPATIENT
Start: 2024-09-24 | End: 2024-09-26 | Stop reason: HOSPADM

## 2024-09-23 RX ORDER — IOPAMIDOL 755 MG/ML
75 INJECTION, SOLUTION INTRAVASCULAR
Status: COMPLETED | OUTPATIENT
Start: 2024-09-23 | End: 2024-09-23

## 2024-09-23 RX ORDER — FENTANYL CITRATE 50 UG/ML
50 INJECTION, SOLUTION INTRAMUSCULAR; INTRAVENOUS ONCE
Status: COMPLETED | OUTPATIENT
Start: 2024-09-23 | End: 2024-09-23

## 2024-09-23 RX ORDER — METOPROLOL SUCCINATE 50 MG/1
25 TABLET, EXTENDED RELEASE ORAL DAILY
Status: DISCONTINUED | OUTPATIENT
Start: 2024-09-24 | End: 2024-09-24

## 2024-09-23 RX ORDER — ACETAMINOPHEN 650 MG/1
650 SUPPOSITORY RECTAL EVERY 6 HOURS PRN
Status: DISCONTINUED | OUTPATIENT
Start: 2024-09-23 | End: 2024-09-26 | Stop reason: HOSPADM

## 2024-09-23 RX ORDER — SODIUM CHLORIDE 9 MG/ML
INJECTION, SOLUTION INTRAVENOUS PRN
Status: DISCONTINUED | OUTPATIENT
Start: 2024-09-23 | End: 2024-09-26 | Stop reason: HOSPADM

## 2024-09-23 RX ORDER — ONDANSETRON 4 MG/1
4 TABLET, ORALLY DISINTEGRATING ORAL EVERY 8 HOURS PRN
Status: DISCONTINUED | OUTPATIENT
Start: 2024-09-23 | End: 2024-09-26 | Stop reason: HOSPADM

## 2024-09-23 RX ORDER — AMIODARONE HYDROCHLORIDE 200 MG/1
200 TABLET ORAL DAILY
Status: DISCONTINUED | OUTPATIENT
Start: 2024-09-24 | End: 2024-09-26 | Stop reason: HOSPADM

## 2024-09-23 RX ORDER — POTASSIUM CHLORIDE 1500 MG/1
40 TABLET, EXTENDED RELEASE ORAL PRN
Status: DISCONTINUED | OUTPATIENT
Start: 2024-09-23 | End: 2024-09-26 | Stop reason: HOSPADM

## 2024-09-23 RX ORDER — POTASSIUM CHLORIDE 7.45 MG/ML
10 INJECTION INTRAVENOUS PRN
Status: DISCONTINUED | OUTPATIENT
Start: 2024-09-23 | End: 2024-09-26 | Stop reason: HOSPADM

## 2024-09-23 RX ORDER — ACETAMINOPHEN 325 MG/1
650 TABLET ORAL EVERY 6 HOURS PRN
Status: DISCONTINUED | OUTPATIENT
Start: 2024-09-23 | End: 2024-09-26 | Stop reason: HOSPADM

## 2024-09-23 RX ORDER — POLYETHYLENE GLYCOL 3350 17 G/17G
17 POWDER, FOR SOLUTION ORAL DAILY PRN
Status: DISCONTINUED | OUTPATIENT
Start: 2024-09-23 | End: 2024-09-26 | Stop reason: HOSPADM

## 2024-09-23 RX ADMIN — IOPAMIDOL 75 ML: 755 INJECTION, SOLUTION INTRAVENOUS at 21:07

## 2024-09-23 RX ADMIN — FENTANYL CITRATE 50 MCG: 50 INJECTION INTRAMUSCULAR; INTRAVENOUS at 22:48

## 2024-09-23 RX ADMIN — ONDANSETRON 4 MG: 2 INJECTION INTRAMUSCULAR; INTRAVENOUS at 22:48

## 2024-09-23 RX ADMIN — PIPERACILLIN AND TAZOBACTAM 4500 MG: 4; .5 INJECTION, POWDER, FOR SOLUTION INTRAVENOUS; PARENTERAL at 22:51

## 2024-09-23 ASSESSMENT — PAIN DESCRIPTION - DESCRIPTORS: DESCRIPTORS: CRAMPING

## 2024-09-23 ASSESSMENT — PAIN SCALES - GENERAL
PAINLEVEL_OUTOF10: 4
PAINLEVEL_OUTOF10: 10
PAINLEVEL_OUTOF10: 10

## 2024-09-23 ASSESSMENT — LIFESTYLE VARIABLES
HOW MANY STANDARD DRINKS CONTAINING ALCOHOL DO YOU HAVE ON A TYPICAL DAY: PATIENT DOES NOT DRINK
HOW OFTEN DO YOU HAVE A DRINK CONTAINING ALCOHOL: NEVER

## 2024-09-23 ASSESSMENT — PAIN - FUNCTIONAL ASSESSMENT: PAIN_FUNCTIONAL_ASSESSMENT: 0-10

## 2024-09-23 ASSESSMENT — PAIN DESCRIPTION - ORIENTATION: ORIENTATION: OTHER (COMMENT)

## 2024-09-23 ASSESSMENT — PAIN DESCRIPTION - LOCATION: LOCATION: ABDOMEN

## 2024-09-24 LAB
ANION GAP SERPL CALCULATED.3IONS-SCNC: 12 MMOL/L (ref 3–16)
BASOPHILS # BLD: 0.1 K/UL (ref 0–0.2)
BASOPHILS NFR BLD: 0.9 %
BUN SERPL-MCNC: 22 MG/DL (ref 7–20)
CALCIUM SERPL-MCNC: 8.4 MG/DL (ref 8.3–10.6)
CHLORIDE SERPL-SCNC: 102 MMOL/L (ref 99–110)
CO2 SERPL-SCNC: 23 MMOL/L (ref 21–32)
CREAT SERPL-MCNC: 0.9 MG/DL (ref 0.9–1.3)
DEPRECATED RDW RBC AUTO: 15.4 % (ref 12.4–15.4)
EKG ATRIAL RATE: 60 BPM
EKG DIAGNOSIS: NORMAL
EKG P AXIS: 132 DEGREES
EKG P-R INTERVAL: 166 MS
EKG Q-T INTERVAL: 490 MS
EKG QRS DURATION: 186 MS
EKG QTC CALCULATION (BAZETT): 490 MS
EKG R AXIS: 177 DEGREES
EKG T AXIS: -16 DEGREES
EKG VENTRICULAR RATE: 60 BPM
EOSINOPHIL # BLD: 0.2 K/UL (ref 0–0.6)
EOSINOPHIL NFR BLD: 2.1 %
GFR SERPLBLD CREATININE-BSD FMLA CKD-EPI: >90 ML/MIN/{1.73_M2}
GLUCOSE SERPL-MCNC: 75 MG/DL (ref 70–99)
HCT VFR BLD AUTO: 35.6 % (ref 40.5–52.5)
HGB BLD-MCNC: 12 G/DL (ref 13.5–17.5)
LYMPHOCYTES # BLD: 1.7 K/UL (ref 1–5.1)
LYMPHOCYTES NFR BLD: 22.9 %
MCH RBC QN AUTO: 29.9 PG (ref 26–34)
MCHC RBC AUTO-ENTMCNC: 33.8 G/DL (ref 31–36)
MCV RBC AUTO: 88.4 FL (ref 80–100)
MONOCYTES # BLD: 0.5 K/UL (ref 0–1.3)
MONOCYTES NFR BLD: 7.1 %
NEUTROPHILS # BLD: 5 K/UL (ref 1.7–7.7)
NEUTROPHILS NFR BLD: 67 %
PLATELET # BLD AUTO: 174 K/UL (ref 135–450)
PMV BLD AUTO: 8.8 FL (ref 5–10.5)
POTASSIUM SERPL-SCNC: 3.6 MMOL/L (ref 3.5–5.1)
RBC # BLD AUTO: 4.02 M/UL (ref 4.2–5.9)
SODIUM SERPL-SCNC: 137 MMOL/L (ref 136–145)
WBC # BLD AUTO: 7.4 K/UL (ref 4–11)

## 2024-09-24 PROCEDURE — G0378 HOSPITAL OBSERVATION PER HR: HCPCS

## 2024-09-24 PROCEDURE — 93005 ELECTROCARDIOGRAM TRACING: CPT | Performed by: INTERNAL MEDICINE

## 2024-09-24 PROCEDURE — 36415 COLL VENOUS BLD VENIPUNCTURE: CPT

## 2024-09-24 PROCEDURE — 96376 TX/PRO/DX INJ SAME DRUG ADON: CPT

## 2024-09-24 PROCEDURE — 85025 COMPLETE CBC W/AUTO DIFF WBC: CPT

## 2024-09-24 PROCEDURE — 6360000002 HC RX W HCPCS: Performed by: NURSE PRACTITIONER

## 2024-09-24 PROCEDURE — 96367 TX/PROPH/DG ADDL SEQ IV INF: CPT

## 2024-09-24 PROCEDURE — 1200000000 HC SEMI PRIVATE

## 2024-09-24 PROCEDURE — 6370000000 HC RX 637 (ALT 250 FOR IP): Performed by: INTERNAL MEDICINE

## 2024-09-24 PROCEDURE — 97530 THERAPEUTIC ACTIVITIES: CPT

## 2024-09-24 PROCEDURE — 6370000000 HC RX 637 (ALT 250 FOR IP): Performed by: FAMILY MEDICINE

## 2024-09-24 PROCEDURE — 97535 SELF CARE MNGMENT TRAINING: CPT

## 2024-09-24 PROCEDURE — 6360000002 HC RX W HCPCS: Performed by: FAMILY MEDICINE

## 2024-09-24 PROCEDURE — 93010 ELECTROCARDIOGRAM REPORT: CPT | Performed by: INTERNAL MEDICINE

## 2024-09-24 PROCEDURE — 99222 1ST HOSP IP/OBS MODERATE 55: CPT | Performed by: INTERNAL MEDICINE

## 2024-09-24 PROCEDURE — 96366 THER/PROPH/DIAG IV INF ADDON: CPT

## 2024-09-24 PROCEDURE — 97162 PT EVAL MOD COMPLEX 30 MIN: CPT

## 2024-09-24 PROCEDURE — 97165 OT EVAL LOW COMPLEX 30 MIN: CPT

## 2024-09-24 PROCEDURE — 2580000003 HC RX 258: Performed by: FAMILY MEDICINE

## 2024-09-24 PROCEDURE — 96375 TX/PRO/DX INJ NEW DRUG ADDON: CPT

## 2024-09-24 PROCEDURE — 80048 BASIC METABOLIC PNL TOTAL CA: CPT

## 2024-09-24 RX ORDER — METOPROLOL SUCCINATE 25 MG/1
12.5 TABLET, EXTENDED RELEASE ORAL EVERY EVENING
Status: DISCONTINUED | OUTPATIENT
Start: 2024-09-24 | End: 2024-09-26

## 2024-09-24 RX ORDER — POTASSIUM CHLORIDE 1500 MG/1
20 TABLET, EXTENDED RELEASE ORAL ONCE
Status: COMPLETED | OUTPATIENT
Start: 2024-09-24 | End: 2024-09-24

## 2024-09-24 RX ORDER — MORPHINE SULFATE 2 MG/ML
2 INJECTION, SOLUTION INTRAMUSCULAR; INTRAVENOUS EVERY 4 HOURS PRN
Status: COMPLETED | OUTPATIENT
Start: 2024-09-24 | End: 2024-09-24

## 2024-09-24 RX ADMIN — AMPICILLIN SODIUM AND SULBACTAM SODIUM 3000 MG: 2; 1 INJECTION, POWDER, FOR SOLUTION INTRAMUSCULAR; INTRAVENOUS at 11:37

## 2024-09-24 RX ADMIN — MORPHINE SULFATE 2 MG: 2 INJECTION, SOLUTION INTRAMUSCULAR; INTRAVENOUS at 21:32

## 2024-09-24 RX ADMIN — MEXILETINE HYDROCHLORIDE 200 MG: 200 CAPSULE ORAL at 01:11

## 2024-09-24 RX ADMIN — SACUBITRIL AND VALSARTAN 1 TABLET: 24; 26 TABLET, FILM COATED ORAL at 01:11

## 2024-09-24 RX ADMIN — MEXILETINE HYDROCHLORIDE 200 MG: 200 CAPSULE ORAL at 08:32

## 2024-09-24 RX ADMIN — ACETAMINOPHEN 650 MG: 325 TABLET ORAL at 01:11

## 2024-09-24 RX ADMIN — FUROSEMIDE 20 MG: 20 TABLET ORAL at 08:24

## 2024-09-24 RX ADMIN — SACUBITRIL AND VALSARTAN 1 TABLET: 24; 26 TABLET, FILM COATED ORAL at 21:28

## 2024-09-24 RX ADMIN — POTASSIUM CHLORIDE 20 MEQ: 1500 TABLET, EXTENDED RELEASE ORAL at 11:33

## 2024-09-24 RX ADMIN — APIXABAN 5 MG: 5 TABLET, FILM COATED ORAL at 01:11

## 2024-09-24 RX ADMIN — Medication 10 ML: at 21:33

## 2024-09-24 RX ADMIN — Medication 10 ML: at 06:07

## 2024-09-24 RX ADMIN — ACETAMINOPHEN 650 MG: 325 TABLET ORAL at 07:48

## 2024-09-24 RX ADMIN — SACUBITRIL AND VALSARTAN 1 TABLET: 24; 26 TABLET, FILM COATED ORAL at 08:24

## 2024-09-24 RX ADMIN — AMIODARONE HYDROCHLORIDE 200 MG: 200 TABLET ORAL at 08:24

## 2024-09-24 RX ADMIN — MORPHINE SULFATE 2 MG: 2 INJECTION, SOLUTION INTRAMUSCULAR; INTRAVENOUS at 08:28

## 2024-09-24 RX ADMIN — MORPHINE SULFATE 2 MG: 2 INJECTION, SOLUTION INTRAMUSCULAR; INTRAVENOUS at 17:25

## 2024-09-24 RX ADMIN — AMPICILLIN SODIUM AND SULBACTAM SODIUM 3000 MG: 2; 1 INJECTION, POWDER, FOR SOLUTION INTRAMUSCULAR; INTRAVENOUS at 17:28

## 2024-09-24 RX ADMIN — SODIUM CHLORIDE: 9 INJECTION, SOLUTION INTRAVENOUS at 06:09

## 2024-09-24 RX ADMIN — AMPICILLIN SODIUM AND SULBACTAM SODIUM 3000 MG: 2; 1 INJECTION, POWDER, FOR SOLUTION INTRAMUSCULAR; INTRAVENOUS at 06:09

## 2024-09-24 RX ADMIN — MEXILETINE HYDROCHLORIDE 200 MG: 200 CAPSULE ORAL at 21:27

## 2024-09-24 RX ADMIN — APIXABAN 5 MG: 5 TABLET, FILM COATED ORAL at 21:27

## 2024-09-24 RX ADMIN — ACETAMINOPHEN 650 MG: 325 TABLET ORAL at 16:06

## 2024-09-24 RX ADMIN — APIXABAN 5 MG: 5 TABLET, FILM COATED ORAL at 07:48

## 2024-09-24 ASSESSMENT — PAIN DESCRIPTION - DESCRIPTORS
DESCRIPTORS: ACHING
DESCRIPTORS: STABBING
DESCRIPTORS: ACHING
DESCRIPTORS: ACHING;SHARP
DESCRIPTORS: ACHING
DESCRIPTORS: ACHING

## 2024-09-24 ASSESSMENT — PAIN SCALES - GENERAL
PAINLEVEL_OUTOF10: 10
PAINLEVEL_OUTOF10: 7
PAINLEVEL_OUTOF10: 6
PAINLEVEL_OUTOF10: 5
PAINLEVEL_OUTOF10: 9
PAINLEVEL_OUTOF10: 9
PAINLEVEL_OUTOF10: 8
PAINLEVEL_OUTOF10: 7
PAINLEVEL_OUTOF10: 7

## 2024-09-24 ASSESSMENT — PAIN - FUNCTIONAL ASSESSMENT
PAIN_FUNCTIONAL_ASSESSMENT: ACTIVITIES ARE NOT PREVENTED
PAIN_FUNCTIONAL_ASSESSMENT: ACTIVITIES ARE NOT PREVENTED
PAIN_FUNCTIONAL_ASSESSMENT: PREVENTS OR INTERFERES SOME ACTIVE ACTIVITIES AND ADLS
PAIN_FUNCTIONAL_ASSESSMENT: PREVENTS OR INTERFERES SOME ACTIVE ACTIVITIES AND ADLS

## 2024-09-24 ASSESSMENT — PAIN DESCRIPTION - LOCATION
LOCATION: ABDOMEN;BACK
LOCATION: ABDOMEN;BACK
LOCATION: BACK;ABDOMEN
LOCATION: ABDOMEN;BACK
LOCATION: BACK;ABDOMEN
LOCATION: ABDOMEN;FLANK

## 2024-09-24 ASSESSMENT — PAIN DESCRIPTION - ORIENTATION: ORIENTATION: LEFT

## 2024-09-25 LAB
ANION GAP SERPL CALCULATED.3IONS-SCNC: 8 MMOL/L (ref 3–16)
BUN SERPL-MCNC: 17 MG/DL (ref 7–20)
CALCIUM SERPL-MCNC: 8.6 MG/DL (ref 8.3–10.6)
CHLORIDE SERPL-SCNC: 100 MMOL/L (ref 99–110)
CO2 SERPL-SCNC: 27 MMOL/L (ref 21–32)
CREAT SERPL-MCNC: 1.1 MG/DL (ref 0.9–1.3)
DEPRECATED RDW RBC AUTO: 15.2 % (ref 12.4–15.4)
GFR SERPLBLD CREATININE-BSD FMLA CKD-EPI: 87 ML/MIN/{1.73_M2}
GLUCOSE SERPL-MCNC: 118 MG/DL (ref 70–99)
HCT VFR BLD AUTO: 38.8 % (ref 40.5–52.5)
HGB BLD-MCNC: 12.9 G/DL (ref 13.5–17.5)
MAGNESIUM SERPL-MCNC: 2.3 MG/DL (ref 1.8–2.4)
MCH RBC QN AUTO: 29.7 PG (ref 26–34)
MCHC RBC AUTO-ENTMCNC: 33.3 G/DL (ref 31–36)
MCV RBC AUTO: 89.1 FL (ref 80–100)
PLATELET # BLD AUTO: 169 K/UL (ref 135–450)
PMV BLD AUTO: 8.5 FL (ref 5–10.5)
POTASSIUM SERPL-SCNC: 4.1 MMOL/L (ref 3.5–5.1)
RBC # BLD AUTO: 4.36 M/UL (ref 4.2–5.9)
SODIUM SERPL-SCNC: 135 MMOL/L (ref 136–145)
WBC # BLD AUTO: 8 K/UL (ref 4–11)

## 2024-09-25 PROCEDURE — 6370000000 HC RX 637 (ALT 250 FOR IP): Performed by: INTERNAL MEDICINE

## 2024-09-25 PROCEDURE — G0378 HOSPITAL OBSERVATION PER HR: HCPCS

## 2024-09-25 PROCEDURE — 96375 TX/PRO/DX INJ NEW DRUG ADDON: CPT

## 2024-09-25 PROCEDURE — 6370000000 HC RX 637 (ALT 250 FOR IP): Performed by: FAMILY MEDICINE

## 2024-09-25 PROCEDURE — 83735 ASSAY OF MAGNESIUM: CPT

## 2024-09-25 PROCEDURE — 6360000002 HC RX W HCPCS: Performed by: INTERNAL MEDICINE

## 2024-09-25 PROCEDURE — 96366 THER/PROPH/DIAG IV INF ADDON: CPT

## 2024-09-25 PROCEDURE — 80048 BASIC METABOLIC PNL TOTAL CA: CPT

## 2024-09-25 PROCEDURE — 6360000002 HC RX W HCPCS: Performed by: FAMILY MEDICINE

## 2024-09-25 PROCEDURE — 85027 COMPLETE CBC AUTOMATED: CPT

## 2024-09-25 PROCEDURE — 1200000000 HC SEMI PRIVATE

## 2024-09-25 PROCEDURE — 36415 COLL VENOUS BLD VENIPUNCTURE: CPT

## 2024-09-25 PROCEDURE — 96376 TX/PRO/DX INJ SAME DRUG ADON: CPT

## 2024-09-25 PROCEDURE — 2580000003 HC RX 258: Performed by: FAMILY MEDICINE

## 2024-09-25 RX ORDER — MORPHINE SULFATE 2 MG/ML
1 INJECTION, SOLUTION INTRAMUSCULAR; INTRAVENOUS EVERY 4 HOURS PRN
Status: COMPLETED | OUTPATIENT
Start: 2024-09-25 | End: 2024-09-26

## 2024-09-25 RX ORDER — DEXAMETHASONE SODIUM PHOSPHATE 4 MG/ML
4 INJECTION, SOLUTION INTRA-ARTICULAR; INTRALESIONAL; INTRAMUSCULAR; INTRAVENOUS; SOFT TISSUE ONCE
Status: COMPLETED | OUTPATIENT
Start: 2024-09-25 | End: 2024-09-25

## 2024-09-25 RX ADMIN — SACUBITRIL AND VALSARTAN 1 TABLET: 24; 26 TABLET, FILM COATED ORAL at 09:15

## 2024-09-25 RX ADMIN — Medication 10 ML: at 09:15

## 2024-09-25 RX ADMIN — PANTOPRAZOLE SODIUM 40 MG: 40 TABLET, DELAYED RELEASE ORAL at 06:05

## 2024-09-25 RX ADMIN — AMIODARONE HYDROCHLORIDE 200 MG: 200 TABLET ORAL at 09:15

## 2024-09-25 RX ADMIN — AMPICILLIN SODIUM AND SULBACTAM SODIUM 3000 MG: 2; 1 INJECTION, POWDER, FOR SOLUTION INTRAMUSCULAR; INTRAVENOUS at 18:27

## 2024-09-25 RX ADMIN — MORPHINE SULFATE 1 MG: 2 INJECTION, SOLUTION INTRAMUSCULAR; INTRAVENOUS at 12:20

## 2024-09-25 RX ADMIN — AMPICILLIN SODIUM AND SULBACTAM SODIUM 3000 MG: 2; 1 INJECTION, POWDER, FOR SOLUTION INTRAMUSCULAR; INTRAVENOUS at 00:10

## 2024-09-25 RX ADMIN — SACUBITRIL AND VALSARTAN 1 TABLET: 24; 26 TABLET, FILM COATED ORAL at 21:18

## 2024-09-25 RX ADMIN — FUROSEMIDE 20 MG: 20 TABLET ORAL at 09:15

## 2024-09-25 RX ADMIN — APIXABAN 5 MG: 5 TABLET, FILM COATED ORAL at 21:18

## 2024-09-25 RX ADMIN — MEXILETINE HYDROCHLORIDE 200 MG: 200 CAPSULE ORAL at 09:15

## 2024-09-25 RX ADMIN — DEXAMETHASONE SODIUM PHOSPHATE 4 MG: 4 INJECTION INTRA-ARTICULAR; INTRALESIONAL; INTRAMUSCULAR; INTRAVENOUS; SOFT TISSUE at 14:44

## 2024-09-25 RX ADMIN — ONDANSETRON 4 MG: 4 TABLET, ORALLY DISINTEGRATING ORAL at 22:33

## 2024-09-25 RX ADMIN — AMPICILLIN SODIUM AND SULBACTAM SODIUM 3000 MG: 2; 1 INJECTION, POWDER, FOR SOLUTION INTRAMUSCULAR; INTRAVENOUS at 12:23

## 2024-09-25 RX ADMIN — MEXILETINE HYDROCHLORIDE 200 MG: 200 CAPSULE ORAL at 21:18

## 2024-09-25 RX ADMIN — Medication 10 ML: at 21:19

## 2024-09-25 RX ADMIN — APIXABAN 5 MG: 5 TABLET, FILM COATED ORAL at 09:14

## 2024-09-25 RX ADMIN — AMPICILLIN SODIUM AND SULBACTAM SODIUM 3000 MG: 2; 1 INJECTION, POWDER, FOR SOLUTION INTRAMUSCULAR; INTRAVENOUS at 06:08

## 2024-09-25 RX ADMIN — METOPROLOL SUCCINATE 12.5 MG: 25 TABLET, FILM COATED, EXTENDED RELEASE ORAL at 18:25

## 2024-09-25 RX ADMIN — MORPHINE SULFATE 1 MG: 2 INJECTION, SOLUTION INTRAMUSCULAR; INTRAVENOUS at 21:29

## 2024-09-25 RX ADMIN — ACETAMINOPHEN 650 MG: 325 TABLET ORAL at 09:14

## 2024-09-25 ASSESSMENT — PAIN - FUNCTIONAL ASSESSMENT
PAIN_FUNCTIONAL_ASSESSMENT: ACTIVITIES ARE NOT PREVENTED
PAIN_FUNCTIONAL_ASSESSMENT: ACTIVITIES ARE NOT PREVENTED
PAIN_FUNCTIONAL_ASSESSMENT: PREVENTS OR INTERFERES SOME ACTIVE ACTIVITIES AND ADLS

## 2024-09-25 ASSESSMENT — PAIN DESCRIPTION - DESCRIPTORS
DESCRIPTORS: PRESSURE
DESCRIPTORS: ACHING;CRAMPING;DISCOMFORT
DESCRIPTORS: ACHING;CRAMPING;DISCOMFORT

## 2024-09-25 ASSESSMENT — PAIN SCALES - GENERAL
PAINLEVEL_OUTOF10: 10
PAINLEVEL_OUTOF10: 7
PAINLEVEL_OUTOF10: 8

## 2024-09-25 ASSESSMENT — PAIN DESCRIPTION - LOCATION
LOCATION: ABDOMEN
LOCATION: ABDOMEN;BACK
LOCATION: ABDOMEN;BACK

## 2024-09-26 VITALS
HEIGHT: 65 IN | OXYGEN SATURATION: 100 % | TEMPERATURE: 97.8 F | WEIGHT: 150 LBS | DIASTOLIC BLOOD PRESSURE: 89 MMHG | HEART RATE: 56 BPM | SYSTOLIC BLOOD PRESSURE: 120 MMHG | BODY MASS INDEX: 24.99 KG/M2 | RESPIRATION RATE: 16 BRPM

## 2024-09-26 PROBLEM — R10.9 ABDOMINAL PAIN: Status: ACTIVE | Noted: 2024-09-26

## 2024-09-26 PROCEDURE — 96376 TX/PRO/DX INJ SAME DRUG ADON: CPT

## 2024-09-26 PROCEDURE — 96366 THER/PROPH/DIAG IV INF ADDON: CPT

## 2024-09-26 PROCEDURE — 2580000003 HC RX 258: Performed by: FAMILY MEDICINE

## 2024-09-26 PROCEDURE — 6370000000 HC RX 637 (ALT 250 FOR IP): Performed by: FAMILY MEDICINE

## 2024-09-26 PROCEDURE — 6360000002 HC RX W HCPCS: Performed by: FAMILY MEDICINE

## 2024-09-26 PROCEDURE — 6360000002 HC RX W HCPCS: Performed by: INTERNAL MEDICINE

## 2024-09-26 PROCEDURE — G0378 HOSPITAL OBSERVATION PER HR: HCPCS

## 2024-09-26 PROCEDURE — 6370000000 HC RX 637 (ALT 250 FOR IP): Performed by: INTERNAL MEDICINE

## 2024-09-26 RX ORDER — METOPROLOL TARTRATE 25 MG/1
25 TABLET, FILM COATED ORAL DAILY
Status: DISCONTINUED | OUTPATIENT
Start: 2024-09-26 | End: 2024-09-26

## 2024-09-26 RX ORDER — METOPROLOL TARTRATE 25 MG/1
12.5 TABLET, FILM COATED ORAL DAILY
Qty: 60 TABLET | Refills: 1 | Status: SHIPPED | OUTPATIENT
Start: 2024-09-26 | End: 2025-05-24

## 2024-09-26 RX ORDER — METOPROLOL TARTRATE 25 MG/1
12.5 TABLET, FILM COATED ORAL DAILY
Status: DISCONTINUED | OUTPATIENT
Start: 2024-09-26 | End: 2024-09-26 | Stop reason: HOSPADM

## 2024-09-26 RX ADMIN — AMPICILLIN SODIUM AND SULBACTAM SODIUM 3000 MG: 2; 1 INJECTION, POWDER, FOR SOLUTION INTRAMUSCULAR; INTRAVENOUS at 12:09

## 2024-09-26 RX ADMIN — FUROSEMIDE 20 MG: 20 TABLET ORAL at 08:09

## 2024-09-26 RX ADMIN — SACUBITRIL AND VALSARTAN 1 TABLET: 24; 26 TABLET, FILM COATED ORAL at 08:09

## 2024-09-26 RX ADMIN — AMPICILLIN SODIUM AND SULBACTAM SODIUM 3000 MG: 2; 1 INJECTION, POWDER, FOR SOLUTION INTRAMUSCULAR; INTRAVENOUS at 06:30

## 2024-09-26 RX ADMIN — AMPICILLIN SODIUM AND SULBACTAM SODIUM 3000 MG: 2; 1 INJECTION, POWDER, FOR SOLUTION INTRAMUSCULAR; INTRAVENOUS at 00:25

## 2024-09-26 RX ADMIN — SPIRONOLACTONE 25 MG: 25 TABLET, FILM COATED ORAL at 08:09

## 2024-09-26 RX ADMIN — AMIODARONE HYDROCHLORIDE 200 MG: 200 TABLET ORAL at 08:09

## 2024-09-26 RX ADMIN — MORPHINE SULFATE 1 MG: 2 INJECTION, SOLUTION INTRAMUSCULAR; INTRAVENOUS at 12:11

## 2024-09-26 RX ADMIN — APIXABAN 5 MG: 5 TABLET, FILM COATED ORAL at 08:09

## 2024-09-26 RX ADMIN — MEXILETINE HYDROCHLORIDE 200 MG: 200 CAPSULE ORAL at 08:09

## 2024-09-26 RX ADMIN — PANTOPRAZOLE SODIUM 40 MG: 40 TABLET, DELAYED RELEASE ORAL at 08:09

## 2024-09-26 ASSESSMENT — PAIN SCALES - GENERAL: PAINLEVEL_OUTOF10: 10

## 2024-09-26 ASSESSMENT — PAIN DESCRIPTION - LOCATION: LOCATION: BACK

## 2024-09-26 ASSESSMENT — PAIN - FUNCTIONAL ASSESSMENT: PAIN_FUNCTIONAL_ASSESSMENT: ACTIVITIES ARE NOT PREVENTED

## 2024-09-30 RX ORDER — MEXILETINE HYDROCHLORIDE 200 MG/1
200 CAPSULE ORAL 2 TIMES DAILY
Qty: 180 CAPSULE | Refills: 2 | Status: SHIPPED | OUTPATIENT
Start: 2024-09-30

## 2024-09-30 RX ORDER — SACUBITRIL AND VALSARTAN 24; 26 MG/1; MG/1
TABLET, FILM COATED ORAL
Qty: 270 TABLET | Refills: 2 | Status: SHIPPED | OUTPATIENT
Start: 2024-09-30

## 2024-10-08 ENCOUNTER — OFFICE VISIT (OUTPATIENT)
Dept: CARDIOLOGY CLINIC | Age: 39
End: 2024-10-08
Payer: MEDICAID

## 2024-10-08 VITALS
OXYGEN SATURATION: 99 % | SYSTOLIC BLOOD PRESSURE: 116 MMHG | HEIGHT: 65 IN | DIASTOLIC BLOOD PRESSURE: 60 MMHG | HEART RATE: 69 BPM | BODY MASS INDEX: 24.96 KG/M2

## 2024-10-08 DIAGNOSIS — R06.02 SHORTNESS OF BREATH: ICD-10-CM

## 2024-10-08 DIAGNOSIS — I44.2 CHB (COMPLETE HEART BLOCK) (HCC): ICD-10-CM

## 2024-10-08 DIAGNOSIS — I42.8 NICM (NONISCHEMIC CARDIOMYOPATHY) (HCC): Primary | ICD-10-CM

## 2024-10-08 DIAGNOSIS — I34.0 NONRHEUMATIC MITRAL VALVE REGURGITATION: ICD-10-CM

## 2024-10-08 DIAGNOSIS — I42.9 CARDIOMYOPATHY, UNSPECIFIED TYPE (HCC): ICD-10-CM

## 2024-10-08 DIAGNOSIS — Q21.12 PFO (PATENT FORAMEN OVALE): ICD-10-CM

## 2024-10-08 DIAGNOSIS — F17.200 CURRENT SMOKER: ICD-10-CM

## 2024-10-08 DIAGNOSIS — I48.11 LONGSTANDING PERSISTENT ATRIAL FIBRILLATION (HCC): ICD-10-CM

## 2024-10-08 DIAGNOSIS — I50.22 CHRONIC SYSTOLIC CONGESTIVE HEART FAILURE (HCC): ICD-10-CM

## 2024-10-08 DIAGNOSIS — Z95.810 PRESENCE OF CARDIAC RESYNCHRONIZATION THERAPY DEFIBRILLATOR (CRT-D): ICD-10-CM

## 2024-10-08 DIAGNOSIS — G71.00 MUSCULAR DYSTROPHY (HCC): ICD-10-CM

## 2024-10-08 DIAGNOSIS — I47.20 VENTRICULAR TACHYCARDIA (HCC): ICD-10-CM

## 2024-10-08 PROCEDURE — 99214 OFFICE O/P EST MOD 30 MIN: CPT | Performed by: NURSE PRACTITIONER

## 2024-10-08 RX ORDER — SPIRONOLACTONE 25 MG/1
25 TABLET ORAL DAILY
Qty: 90 TABLET | Refills: 1 | Status: SHIPPED | OUTPATIENT
Start: 2024-10-08

## 2024-10-08 NOTE — PATIENT INSTRUCTIONS
Stop the furosemide   Start spironolactone 25 mg daily instead  I will message Dr. Mckeon regarding your side effects to the mexilitine  No change in the Entresto or metoprolol  Echocardiogram to relook at heart function and heart valves (murmur) - call [address] [place] [address] [phone]) to schedule   Follow up with me in 1 month

## 2024-10-08 NOTE — PROGRESS NOTES
regurgitation.   There is some degree of posterior leaflet restriction resulting in an eccentric posteriorly directed jet.   This may be underestimated.   Mild to moderate tricuspid regurgitation.   Systolic pulmonic artery pressure (SPAP) is estimated at 54 mmHg consistent with moderate pulmonary hypertension (Right atrial pressure of 8 mmHg).       Echo - 8/1/2023   Definity® used for myocardial border enhancement.   Left ventricular systolic function is severely reduced with a visually estimated ejection fraction of 25-30 %.   EF estimated by Smith's method at 25 %.   The left ventricle is severely dilated in size.   Prominent trabeculation noted consistent with known diagnosis of non-compaction cardiomyopathy.   Severe global hypokinesis with regional variation.   Elevated left atrial pressures with a septal E/e' ratio of 17.7.   Average peak global longitudinal strain is reduced at -5.9%.   The right ventricle is normal in size with indeterminate systolic function.   The right atrium is mildly enlarged.   The left atrium is severely enlarged.   A bubble study was performed and shows evidence of right to left shunting consistent with a small PFO/ASD with bubbles visualized crossing the interatrial septum.   Qp:Qs ratio is 1.0 .   Mild mitral and tricuspid regurgitation.   Systolic pulmonary artery pressure (SPAP) is elevated and estimated at 47 mmHg (right atrial pressure 15 mmHg) consistent with mild pulmonary   hypertension .   The IVC is dilated in size (>2.1 cm) and collapses <50% with respiration consistent with markedly elevated right atrial pressures (15 mmHg) .     Cardiac MRI (2021)  LV dilation with moderate systolic dysfunction, diffuse interstitial expansion and prominent non-ischemic   fibrosis. Prominent LV trabeculae meeting criteria for LV non-compaction cardiomyopathy which can be seen   in patients with myotonic dystrophy.     Stress - 2021  1. Pharmacologic stress myocardial perfusion scan

## 2024-10-15 ENCOUNTER — TELEPHONE (OUTPATIENT)
Dept: CARDIOLOGY CLINIC | Age: 39
End: 2024-10-15

## 2024-10-15 DIAGNOSIS — R30.0 DYSURIA: Primary | ICD-10-CM

## 2024-10-15 NOTE — TELEPHONE ENCOUNTER
Recommend he follow up with his PCP for a U/A.  Spironolactone should not be causing pain when urinating. He was to discontinue his lasix per last OV of NPDD on 10/8/24.

## 2024-10-15 NOTE — TELEPHONE ENCOUNTER
Pt returned call, KXA message relayed. Pt states he was getting abdominal pain and upset stomach on medication. Pt states it has been a few weeks since stopping.

## 2024-10-15 NOTE — TELEPHONE ENCOUNTER
PT contacted office in regards to spirolactone, pt states since taking he has been having issues urinating. Pt states he can't pee and when he does it is painful. Pt could not remember if [employer] told him to stop taking lasix,  per LOV he was. Informed pt of that information. Pt would like to know if this is temporary or when he will be able to pee again

## 2024-10-15 NOTE — TELEPHONE ENCOUNTER
Spoke to pt, relayed npde message. Pt v/u.  Per npde verbal order pt needs BMP. Labs ordered and relayed to pt

## 2024-10-15 NOTE — TELEPHONE ENCOUNTER
----- Message from Dr. Marta Mckeon MD sent at 10/12/2024  7:21 AM EDT -----  Regarding: FW:  Please check with patient on:  1. What side effects was he experiencing?  2. What was the actual dose he was using last?    I am very concerned that he will have more arrhythmia and potentially defibrillator shocks without both medications.  ----- Message -----  From: Reyna Thao, APRN - CNP  Sent: 10/8/2024   5:22 PM EDT  To: Marta Mckeon MD; [facility]     Dr. Linda Johnson stopped taking the mexilitine due to side effects. He is still taking the metoprolol and amiodarone.  Please address...  Thank you, Reyna

## 2024-10-15 NOTE — TELEPHONE ENCOUNTER
Please check with patient on:  1. What side effects was he experiencing?  2. What was the actual dose he was using last?     I am very concerned that he will have more arrhythmia and potentially defibrillator shocks without both medications.    I attempted to call the patient to relay KXA message. MIL.

## 2024-10-15 NOTE — TELEPHONE ENCOUNTER
Marta Mckeon MD  You1 hour ago (12:08 PM)       Can he try taking it once daily and see if that still causes the side effects mentioned?   I spoke with the patient and relayed KXA message. He V/U.

## 2024-10-17 NOTE — TELEPHONE ENCOUNTER
I agree, the spironolactone should not be causing difficulty urinating.     The spironolactone is not as \"strong\" a diuretic as the Lasix and he may not urinate as much with this.   Agree with follow up with PCP and to have BMP.     Thank you, Reyna

## 2024-10-21 ENCOUNTER — TELEPHONE (OUTPATIENT)
Dept: CARDIOLOGY CLINIC | Age: 39
End: 2024-10-21

## 2024-10-22 NOTE — TELEPHONE ENCOUNTER
CARELINK shows decreased thoracic impedance consistent with increased fluid index.    He had reported side effects to spironolactone so likely not taking this.  I had also recommended stopping the Lasix in favor of spironolactone.  However, if he is not taking this he should restart taking the Lasix 20 mg daily.     Thank you, Reyna

## 2024-10-22 NOTE — TELEPHONE ENCOUNTER
He won't urinate as much with the spironolactone.  It is a different type of diuretic.  It will do more to improve heart function.   Have him restart the furosemide (Lasix) 20 mg twice weekly.     Thank you, Reyna

## 2024-10-22 NOTE — TELEPHONE ENCOUNTER
MRN# [id number] Disability Spoke with pt and relayed message. Pt stated he is taking the spironolactone since his last visit. Pt stated he is not peeing as much as he was with the lasix.

## 2024-10-28 ENCOUNTER — TELEPHONE (OUTPATIENT)
Dept: CARDIOLOGY CLINIC | Age: 39
End: 2024-10-28

## 2024-10-28 DIAGNOSIS — I48.0 PAROXYSMAL ATRIAL FIBRILLATION (HCC): Primary | ICD-10-CM

## 2024-10-28 DIAGNOSIS — I27.20 PULMONARY HTN (HCC): ICD-10-CM

## 2024-10-28 DIAGNOSIS — Z79.899 ON AMIODARONE THERAPY: Primary | ICD-10-CM

## 2024-10-28 DIAGNOSIS — I47.20 VENTRICULAR TACHYCARDIA (HCC): ICD-10-CM

## 2024-10-28 RX ORDER — AMIODARONE HYDROCHLORIDE 200 MG/1
200 TABLET ORAL DAILY
Qty: 30 TABLET | Refills: 0 | Status: SHIPPED | OUTPATIENT
Start: 2024-10-28 | End: 2024-12-23 | Stop reason: SDUPTHER

## 2024-10-28 RX ORDER — METOPROLOL TARTRATE 25 MG/1
12.5 TABLET, FILM COATED ORAL DAILY
Qty: 45 TABLET | Refills: 3 | Status: SHIPPED | OUTPATIENT
Start: 2024-10-28 | End: 2025-06-25

## 2024-10-28 NOTE — TELEPHONE ENCOUNTER
Pt would like to confirm what dosage of Metoprolol he should be taking. If it is the higher dose he will need a refill sent to     [facility] [id number] - [address] [phone]     Last ov 10/8/2024 npdd  Next ov 11/06/2024 npdd

## 2024-10-28 NOTE — TELEPHONE ENCOUNTER
Pt is requesting refill of Amidoarone 200mg. Preferred pharmacy is    [facility] [id number] - [address] [phone]     Last ov 05/22/2024 rosario

## 2024-10-28 NOTE — TELEPHONE ENCOUNTER
BMP 9/2024  LFT 9/2024  TSH 1/2024    30 day refill sent    Front- patient needs f/u with KXA scheduled. Was supposed to follow up in August. Also needs thyroid level, order placed- go to any [facility] lab.     Can offer 1/15 at 1145 with KXA

## 2024-10-28 NOTE — TELEPHONE ENCOUNTER
Spoke with pt. He reports he is out of metoprolol. Rx pending sign off for review.     Next OV NPDD 11/6/2024  NPDD LOV 10/8/2024  No change in the Entresto or metoprolol   CARDIAC MEDS:   Entresto 24/26 1.5 tab bid  Metoprolol 12.5 qd  Lasix 20 qd  Amio 200 qd  Eliquis 5 bid

## 2024-10-29 NOTE — TELEPHONE ENCOUNTER
10/29 Called [phone].  Message stated that \"Number you have called has call restrictions\" and would not allow to lvm.  Will try again later

## 2024-11-06 ENCOUNTER — OFFICE VISIT (OUTPATIENT)
Dept: CARDIOLOGY CLINIC | Age: 39
End: 2024-11-06
Payer: MEDICAID

## 2024-11-06 VITALS
DIASTOLIC BLOOD PRESSURE: 64 MMHG | HEART RATE: 60 BPM | SYSTOLIC BLOOD PRESSURE: 106 MMHG | OXYGEN SATURATION: 99 % | BODY MASS INDEX: 24.99 KG/M2 | HEIGHT: 65 IN | WEIGHT: 150 LBS

## 2024-11-06 DIAGNOSIS — I27.20 PULMONARY HTN (HCC): ICD-10-CM

## 2024-11-06 DIAGNOSIS — I50.22 CHRONIC SYSTOLIC CONGESTIVE HEART FAILURE (HCC): ICD-10-CM

## 2024-11-06 DIAGNOSIS — I47.20 VENTRICULAR TACHYCARDIA (HCC): ICD-10-CM

## 2024-11-06 DIAGNOSIS — I42.8 NICM (NONISCHEMIC CARDIOMYOPATHY) (HCC): Primary | ICD-10-CM

## 2024-11-06 DIAGNOSIS — F17.200 CURRENT SMOKER: ICD-10-CM

## 2024-11-06 DIAGNOSIS — Z79.899 ON AMIODARONE THERAPY: ICD-10-CM

## 2024-11-06 DIAGNOSIS — Z95.810 PRESENCE OF CARDIAC RESYNCHRONIZATION THERAPY DEFIBRILLATOR (CRT-D): ICD-10-CM

## 2024-11-06 DIAGNOSIS — G71.00 MUSCULAR DYSTROPHY (HCC): ICD-10-CM

## 2024-11-06 DIAGNOSIS — I48.0 PAROXYSMAL ATRIAL FIBRILLATION (HCC): ICD-10-CM

## 2024-11-06 DIAGNOSIS — Q21.12 PFO (PATENT FORAMEN OVALE): ICD-10-CM

## 2024-11-06 DIAGNOSIS — I34.0 NONRHEUMATIC MITRAL VALVE REGURGITATION: ICD-10-CM

## 2024-11-06 PROCEDURE — 99214 OFFICE O/P EST MOD 30 MIN: CPT | Performed by: NURSE PRACTITIONER

## 2024-11-06 NOTE — PROGRESS NOTES
[facility]   Cardiac Evaluation    Primary Care Doctor:  Amadou Rodriguez MD    Chief Complaint   Patient presents with    Follow-up     4wks    Tachycardia    Hypertension     pulmonary    Atrial Fibrillation    Other     PFO        Assessment:    1. NICM (nonischemic cardiomyopathy) ([facility])    2. Chronic systolic congestive heart failure ([facility])    3. Paroxysmal atrial fibrillation ([facility])    4. Nonrheumatic mitral valve regurgitation    5. Pulmonary HTN ([facility])    6. Ventricular tachycardia (HCC)    7. On amiodarone therapy    8. Presence of cardiac resynchronization therapy defibrillator (CRT-D)    9. PFO (patent foramen ovale)    10. Muscular dystrophy ([facility])    11. Current smoker        Plan:   Continue the furosemide (Lasix) 20 mg twice weekly  Stay on same dose of spironolactone 25 mg daily, Entresto 24/ 26 mg 1.5 tablets twice daily and Toprol 12.5 mg once daily  Continue the Amiodarone and Eliquis per Dr. Marta Mckeon   Blood work in couple of weeks - definitely if the bleeding continues  Follow up with Dr. Marta Mckeon in January as scheduled  Follow up with me the same day - 1/15/25 at 11 am  Let me know if you need me to resend the metoprolol (Toprol XL) 25 mg - half tablet once daily  Call [facility] ([phone]) to schedule to schedule the echocardiogram - by the end of the year    Vitals:    11/06/24 1247   BP: 106/64   Pulse: 60   SpO2: 99%   Weight: 68 kg (150 lb)   Height: 1.651 m (5' 5\")       Primary Cardiologist: Dr. Shilo Michael/ Dr. 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  At last visit he noted symptoms with furosemide so we restarted spironolactone 25 mg daily.  Due to [facility] transmission showing evidence of increased fluid

## 2024-11-06 NOTE — PATIENT INSTRUCTIONS
Continue the furosemide (Lasix) 20 mg twice weekly  Stay on same dose of spironolactone 25 mg daily, Entresto 24/ 26 mg 1.5 tablets twice daily and Toprol 12.5 mg once daily  Continue the Amiodarone and Eliquis per Dr. Marta Mckeon   Blood work in couple of weeks - definitely if the bleeding continues  Follow up with Dr. Marta Mckeon in January as scheduled  Follow up with me the same day - 1/15/25 at 11 am  Let me know if you need me to resend the metoprolol (Toprol XL) 25 mg - half tablet once daily  Call [address] [place] [address] ([phone]) to schedule to schedule the echocardiogram - by the end of the year

## 2024-12-04 ENCOUNTER — TELEPHONE (OUTPATIENT)
Dept: CARDIOLOGY CLINIC | Age: 39
End: 2024-12-04

## 2024-12-04 PROCEDURE — 93297 REM INTERROG DEV EVAL ICPMS: CPT | Performed by: NURSE PRACTITIONER

## 2024-12-04 NOTE — TELEPHONE ENCOUNTER
[facility] consistent with stable fluid index.      No change.   Reyna Thao, APRN - CNP

## 2024-12-23 DIAGNOSIS — I47.20 VENTRICULAR TACHYCARDIA (HCC): ICD-10-CM

## 2024-12-23 RX ORDER — AMIODARONE HYDROCHLORIDE 200 MG/1
200 TABLET ORAL DAILY
Qty: 60 TABLET | Refills: 0 | Status: SHIPPED | OUTPATIENT
Start: 2024-12-23

## 2024-12-23 NOTE — TELEPHONE ENCOUNTER
Last ov 05/22/2024  Upcoming ov 01/15/2025  CBC 09/2024  BMP 09/2024  TSH 01/2024  LFT 09/2024  EKG 09/2024    ML MATTSON

## 2024-12-24 RX ORDER — APIXABAN 5 MG/1
5 TABLET, FILM COATED ORAL 2 TIMES DAILY
Qty: 180 TABLET | Refills: 3 | Status: SHIPPED | OUTPATIENT
Start: 2024-12-24

## 2025-01-14 ASSESSMENT — ENCOUNTER SYMPTOMS
STRIDOR: 0
SHORTNESS OF BREATH: 0
HEMATOCHEZIA: 0
LEFT EYE: 0
HEMATEMESIS: 0
RIGHT EYE: 0
WHEEZING: 0

## 2025-01-14 NOTE — PROGRESS NOTES
Assessment:     1. Ventricular tachycardia/ICD shock/NICM: Patient presented in October 2023 with runs of wide-complex tachycardia appearing to represent ventricular tachycardia (mostly nonsustained). There was no hemodynamic instability in general.  He was in a paced rhythm. He was stabilized with amiodarone and subsequently underwent upgrade to CRTD device (secondary prevention).      Of note, he had a cardiac MRI a few years ago which showed significant myocardial fibrosis which is likely the nidus for his ventricular arrhythmias (scar-based).    VIABILITY: Late gadolinium enhancement imaging demonstrates striking linear midwall fibrosis in the entire septum, partially extending into the basal anterior wall - all of non-ischemic etiology.     Patient underwent upgrade of his pacemaker device to a biventricular ICD in November 2023 (suspected component of pacing induced arrhythmia and heart failure).      He did have myocardial perfusion imaging few years ago without evidence of ischemia.      Presented in early February 2024 with ICD shock (appeared he may have stopped using his amiodarone due to running out of medication). The appearance of the arrhythmia was that of true VT. Much less likely to have been atrial fibrillation with rapid ventricular response given presence of high degree/complete heart block and the overall cardiac substrate we are dealing with.     We restarted amiodarone 200 mg daily but he continued to have VT episodes. We ended up adding mexiletine. His device interrogation showed, at subsequent office visit, continuing episodes of NSVT and longer but slower VT. Due to concern about long-term use of amiodarone as well (cumulative side effects), we discussed the option of VT ablation (scar modification).    VT ablation was performed on 4/16/2024. Most of the endocardium was not involved (good voltage noted). During induced VT, ventricular entrainment was attempted from multiple areas of the

## 2025-01-15 ENCOUNTER — OFFICE VISIT (OUTPATIENT)
Dept: CARDIOLOGY CLINIC | Age: 40
End: 2025-01-15
Payer: MEDICAID

## 2025-01-15 ENCOUNTER — NURSE ONLY (OUTPATIENT)
Dept: CARDIOLOGY CLINIC | Age: 40
End: 2025-01-15

## 2025-01-15 VITALS
HEART RATE: 90 BPM | BODY MASS INDEX: 24.96 KG/M2 | OXYGEN SATURATION: 99 % | HEIGHT: 65 IN | SYSTOLIC BLOOD PRESSURE: 138 MMHG | DIASTOLIC BLOOD PRESSURE: 84 MMHG

## 2025-01-15 VITALS
WEIGHT: 149.91 LBS | BODY MASS INDEX: 24.98 KG/M2 | HEART RATE: 90 BPM | SYSTOLIC BLOOD PRESSURE: 138 MMHG | DIASTOLIC BLOOD PRESSURE: 84 MMHG | HEIGHT: 65 IN | OXYGEN SATURATION: 99 %

## 2025-01-15 DIAGNOSIS — G71.00 MUSCULAR DYSTROPHY (HCC): ICD-10-CM

## 2025-01-15 DIAGNOSIS — I42.8 NICM (NONISCHEMIC CARDIOMYOPATHY) (HCC): ICD-10-CM

## 2025-01-15 DIAGNOSIS — Z95.810 PRESENCE OF CARDIAC RESYNCHRONIZATION THERAPY DEFIBRILLATOR (CRT-D): Primary | ICD-10-CM

## 2025-01-15 DIAGNOSIS — Z51.81 ENCOUNTER FOR MONITORING AMIODARONE THERAPY: ICD-10-CM

## 2025-01-15 DIAGNOSIS — Z95.810 PRESENCE OF CARDIAC RESYNCHRONIZATION THERAPY DEFIBRILLATOR (CRT-D): ICD-10-CM

## 2025-01-15 DIAGNOSIS — I50.22 CHRONIC SYSTOLIC CONGESTIVE HEART FAILURE (HCC): ICD-10-CM

## 2025-01-15 DIAGNOSIS — I27.20 PULMONARY HTN (HCC): ICD-10-CM

## 2025-01-15 DIAGNOSIS — Q21.12 PFO (PATENT FORAMEN OVALE): ICD-10-CM

## 2025-01-15 DIAGNOSIS — I34.0 NONRHEUMATIC MITRAL VALVE REGURGITATION: ICD-10-CM

## 2025-01-15 DIAGNOSIS — Z79.899 ON AMIODARONE THERAPY: ICD-10-CM

## 2025-01-15 DIAGNOSIS — I48.0 PAROXYSMAL ATRIAL FIBRILLATION (HCC): Primary | ICD-10-CM

## 2025-01-15 DIAGNOSIS — I47.20 VENTRICULAR TACHYCARDIA (HCC): ICD-10-CM

## 2025-01-15 DIAGNOSIS — M79.89 PAIN AND SWELLING OF TOE OF RIGHT FOOT: ICD-10-CM

## 2025-01-15 DIAGNOSIS — I50.22 CHRONIC SYSTOLIC HEART FAILURE (HCC): ICD-10-CM

## 2025-01-15 DIAGNOSIS — I48.0 PAROXYSMAL ATRIAL FIBRILLATION (HCC): ICD-10-CM

## 2025-01-15 DIAGNOSIS — Z79.899 ENCOUNTER FOR MONITORING AMIODARONE THERAPY: ICD-10-CM

## 2025-01-15 DIAGNOSIS — M79.674 PAIN AND SWELLING OF TOE OF RIGHT FOOT: ICD-10-CM

## 2025-01-15 DIAGNOSIS — I42.8 NICM (NONISCHEMIC CARDIOMYOPATHY) (HCC): Primary | ICD-10-CM

## 2025-01-15 PROCEDURE — G2211 COMPLEX E/M VISIT ADD ON: HCPCS | Performed by: INTERNAL MEDICINE

## 2025-01-15 PROCEDURE — 99214 OFFICE O/P EST MOD 30 MIN: CPT | Performed by: INTERNAL MEDICINE

## 2025-01-15 PROCEDURE — 99214 OFFICE O/P EST MOD 30 MIN: CPT | Performed by: NURSE PRACTITIONER

## 2025-01-15 RX ORDER — FUROSEMIDE 20 MG/1
1 TABLET ORAL DAILY
COMMUNITY
End: 2025-01-15 | Stop reason: DRUGHIGH

## 2025-01-15 RX ORDER — METOPROLOL SUCCINATE 25 MG/1
12.5 TABLET, EXTENDED RELEASE ORAL 2 TIMES DAILY
Qty: 90 TABLET | Refills: 1 | Status: SHIPPED | OUTPATIENT
Start: 2025-01-15

## 2025-01-15 RX ORDER — FUROSEMIDE 20 MG/1
20 TABLET ORAL EVERY OTHER DAY
Qty: 45 TABLET | Refills: 1 | Status: SHIPPED | COMMUNITY
Start: 2025-01-15

## 2025-01-15 RX ORDER — AMIODARONE HYDROCHLORIDE 200 MG/1
200 TABLET ORAL DAILY
Qty: 90 TABLET | Refills: 3 | Status: SHIPPED | OUTPATIENT
Start: 2025-01-15

## 2025-01-15 NOTE — PROGRESS NOTES
Pharmacologic stress myocardial perfusion scan is abnormal.   2. No evidence of ischemia.   3. Large, severe, fixed perfusion defect in the inferior, inferoseptal, and apical segments. This partially resolves with CT attenuation correction, suggesting component of diaphragm attenuation artifact.   However, there remains a severe apical, distal inferior, and distal inferoseptal defect. Cannot exclude prior infarction vs pacing artifact.   4. Enlarged left ventricle chamber size with reduced systolic function, LVEF 33%.     ECHO 6/3/2016:   Study Conclusions   - Left ventricle: The cavity size was normal. Wall thickness was  normal. Systolic function was severely reduced. The calculated ejection fraction was in the range of 25% to 30%. Severe diffuse     hypokinesis. Doppler parameters are consistent with a reversible restrictive pattern, indicative of decreased left ventricular diastolic compliance and/or increased left atrial pressure (grade 3 diastolic dysfunction).   - Right ventricle: Pacer wire noted in right ventricle.   - Tricuspid valve: There was mild-moderate regurgitation.   - Inferior vena cava: The vessel was normal in size; the respirophasic diameter changes were in the normal range (>= 50%); findings are consistent with normal central venous pressure.     I appreciate the opportunity of cooperating in the care of this individual.    Reyna Thao, DEB - CNP, 1/15/2025, 11:19 AM

## 2025-01-15 NOTE — PATIENT INSTRUCTIONS
Change the metoprolol tartrate to metoprolol succinate (Toprol XL) 25 mg, half tablet twice daily  Continue both the furosemide (Lasix) 20 mg every other day and the spironolactone 25 mg once daily  Continue current dose of Entresto 24/26 mg twice daily  Call to schedule the echocardiogram - call [phone] ([phone]) to schedule   Follow up with Dr. Marta Mckeon today  Follow up with Dr. Michael in 3 months  Schedule an appointment with me in 6 months

## 2025-01-15 NOTE — PATIENT INSTRUCTIONS
Plan:     Routine labs for amiodarone toxicity: TSH and CMP.  Remote device interrogations every three months.   Continue taking current cardiac medications as prescribed.   Follow up with me in 6 months.

## 2025-02-10 NOTE — TELEPHONE ENCOUNTER
On Eliquis, follows with cardiology, asymptomatic      Pt states for last 3 days his heart rate has been 155 bpm. Pt states he has been having on and off chest pain, but not currently having these symptoms. Pt will send in manual transmission. Advised pt that we recommend ED for chest pain. Please advise.

## 2025-03-18 RX ORDER — FUROSEMIDE 20 MG/1
20 TABLET ORAL EVERY OTHER DAY
Qty: 90 TABLET | Refills: 1 | Status: SHIPPED | OUTPATIENT
Start: 2025-03-18

## 2025-04-01 ENCOUNTER — TELEPHONE (OUTPATIENT)
Dept: CARDIOLOGY CLINIC | Age: 40
End: 2025-04-01

## 2025-04-01 NOTE — TELEPHONE ENCOUNTER
MARA Pt called office, pt is traveling to [place] and leaving for the airport in one hour. Pt called to ask if he should take his pacemaker device for transmissions with him. Pt said he will be gone anywhere from 2 weeks-1 month. I advised pt take it with him since it will be longer than 2 weeks. If pt needs to be advised differently. Please call:[phone]

## 2025-04-15 NOTE — PROGRESS NOTES
[facility] - Progress/Follow-up Note        REASON FOR FOLLOW UP  Nonischemic cardiomyopathy      INTERVAL HISTORY  Mr. Isaac is a 39 y.o. male presenting for follow up. He has a history of nonischemic cardiomyopathy, systolic CHF, VT s/p BIV ICD, PAF, PFO, DHIRAJ, HLD, pulmonary HTN and history of drug use.    Admitted 7/2023 with complaints of flank pain and shortness of breath. Work-up this admission has shown right renal infarct as well as splenic infarct most likely of embolic origin. He recently came out of residential and reports that he has not taken any of his cardiac medications including anticoagulation prescribed for A-fib/flutter for several years.      Admitted 11/2023 with intermittent chest pain. He was having runs of wide-complex tachycardia which appears to be ventricular tachycardia mostly nonsustained. Troponin was elevated troponin elevation likely due to rapid ventricular arrhythmias.  He was stabilized with amiodarone and subsequently underwent upgrade to CRTD device.     Echo 11/25/2023 LVEF <20%.     Admitted 2/2024 for ICD shock. Patient stated he felt like his heart was beating fast for 1 to 2 minutes. Then he felt strong shocks to his chest. He reported he stopped using his amiodarone (pharmacy did not have it available). ICD was interrogated which revealed ventricular tachycardia.     Admitted 4/2024 for EPS and ventricular tachycardia ablation.  Postprocedure course was complicated by severe right groin, scrotum and lower right abdominal pain.  Patient became hypotensive.  CT scan revealed large retroperitoneal hematoma and scrotal hematoma. He underwent repair of right iliac artery.     Admitted 6/2024 with complaints of palpitations. Found to have slow and relatively nonsustained episodes of VT. He reported not taking amiodarone for 3 days.     In the interim he has had several visits with Zoila Thao NP. Last visit with Reyna Thao 1/15/25, He reported fatigue and

## 2025-04-18 ENCOUNTER — OFFICE VISIT (OUTPATIENT)
Dept: CARDIOLOGY CLINIC | Age: 40
End: 2025-04-18
Payer: MEDICAID

## 2025-04-18 VITALS
HEART RATE: 76 BPM | RESPIRATION RATE: 100 BRPM | SYSTOLIC BLOOD PRESSURE: 115 MMHG | DIASTOLIC BLOOD PRESSURE: 70 MMHG | HEIGHT: 65 IN | BODY MASS INDEX: 24.95 KG/M2

## 2025-04-18 DIAGNOSIS — E78.5 HYPERLIPIDEMIA, UNSPECIFIED HYPERLIPIDEMIA TYPE: ICD-10-CM

## 2025-04-18 DIAGNOSIS — Z95.810 PRESENCE OF CARDIAC RESYNCHRONIZATION THERAPY DEFIBRILLATOR (CRT-D): ICD-10-CM

## 2025-04-18 DIAGNOSIS — I48.11 LONGSTANDING PERSISTENT ATRIAL FIBRILLATION (HCC): ICD-10-CM

## 2025-04-18 DIAGNOSIS — I50.22 CHRONIC HFREF (HEART FAILURE WITH REDUCED EJECTION FRACTION) (HCC): ICD-10-CM

## 2025-04-18 DIAGNOSIS — I5A NON-ISCHEMIC MYOCARDIAL INJURY (NON-TRAUMATIC): Primary | ICD-10-CM

## 2025-04-18 PROCEDURE — G2211 COMPLEX E/M VISIT ADD ON: HCPCS | Performed by: INTERNAL MEDICINE

## 2025-04-18 PROCEDURE — 99214 OFFICE O/P EST MOD 30 MIN: CPT | Performed by: INTERNAL MEDICINE

## 2025-04-18 RX ORDER — PANTOPRAZOLE SODIUM 40 MG/1
40 TABLET, DELAYED RELEASE ORAL
Qty: 90 TABLET | Refills: 3 | Status: SHIPPED | OUTPATIENT
Start: 2025-04-18

## 2025-04-18 RX ORDER — SPIRONOLACTONE 25 MG/1
25 TABLET ORAL DAILY
Qty: 90 TABLET | Refills: 3 | Status: SHIPPED | OUTPATIENT
Start: 2025-04-18

## 2025-04-18 NOTE — PATIENT INSTRUCTIONS
PLAN:  Continue current cardiac medications amiodarone 200, eliquis 5 BID, entresto 24-26 BID, lasix 20, toprol xl 12.5, aldactone 25  Echocardiogram as previously ordered to assess LVEF and wall motion  Follow up with Dr. Mckeon as scheduled   Follow up in 6 months with Dr. Michael or Zoila Thao NP      Your provider has ordered testing for further evaluation.  An order/prescription has been included in your paper work.   To schedule outpatient testing, contact Central Scheduling by calling [employer] ([phone]).

## 2025-04-21 ENCOUNTER — TRANSCRIBE ORDERS (OUTPATIENT)
Dept: ADMINISTRATIVE | Age: 40
End: 2025-04-21

## 2025-04-21 DIAGNOSIS — N63.41 SUBAREOLAR MASS OF RIGHT BREAST: Primary | ICD-10-CM

## 2025-04-21 RX ORDER — SPIRONOLACTONE 25 MG/1
25 TABLET ORAL DAILY
Qty: 90 TABLET | Refills: 3 | OUTPATIENT
Start: 2025-04-21

## 2025-05-06 ENCOUNTER — TRANSCRIBE ORDERS (OUTPATIENT)
Dept: ADMINISTRATIVE | Age: 40
End: 2025-05-06

## 2025-05-06 DIAGNOSIS — N63.41 SUBAREOLAR MASS OF RIGHT BREAST: Primary | ICD-10-CM

## 2025-05-23 DIAGNOSIS — I47.20 VENTRICULAR TACHYCARDIA (HCC): ICD-10-CM

## 2025-05-23 RX ORDER — AMIODARONE HYDROCHLORIDE 200 MG/1
200 TABLET ORAL DAILY
Qty: 90 TABLET | Refills: 0 | Status: SHIPPED | OUTPATIENT
Start: 2025-05-23

## 2025-05-23 NOTE — TELEPHONE ENCOUNTER
Last OV - 01/15/2025  Upcoming OV - 07/15/2025    Last labs - 10/2024 TSH and CMP ordered. I spoke with the patient and he will obtain updated labs.     ML is OOT.

## 2025-05-23 NOTE — TELEPHONE ENCOUNTER
Pt called requesting a refill for amiodarone (CORDARONE) 200 MG tablet   Please send to   [facility] [id number] - [address] NOLVIA SCHAFER [phone] - F [phone] [[id number]]

## 2025-05-30 ENCOUNTER — HOSPITAL ENCOUNTER (OUTPATIENT)
Dept: LAB | Age: 40
Discharge: HOME | End: 2025-05-30
Payer: MEDICAID

## 2025-05-30 DIAGNOSIS — I48.0 PAROXYSMAL ATRIAL FIBRILLATION (HCC): ICD-10-CM

## 2025-05-30 LAB
ALBUMIN SERPL-MCNC: 4.3 G/DL (ref 3.4–5)
ALBUMIN/GLOB SERPL: 1.3 {RATIO} (ref 1.1–2.2)
ALP SERPL-CCNC: 88 U/L (ref 40–129)
ALT SERPL-CCNC: 55 U/L (ref 10–40)
ANION GAP SERPL CALCULATED.3IONS-SCNC: 10 MMOL/L (ref 3–16)
AST SERPL-CCNC: 38 U/L (ref 15–37)
BILIRUB SERPL-MCNC: 0.6 MG/DL (ref 0–1)
BUN SERPL-MCNC: 29 MG/DL (ref 7–20)
CALCIUM SERPL-MCNC: 8.9 MG/DL (ref 8.3–10.6)
CHLORIDE SERPL-SCNC: 105 MMOL/L (ref 99–110)
CO2 SERPL-SCNC: 24 MMOL/L (ref 21–32)
CREAT SERPL-MCNC: 1 MG/DL (ref 0.9–1.3)
GFR SERPLBLD CREATININE-BSD FMLA CKD-EPI: >90 ML/MIN/{1.73_M2}
GLUCOSE SERPL-MCNC: 105 MG/DL (ref 70–99)
POTASSIUM SERPL-SCNC: 4.5 MMOL/L (ref 3.5–5.1)
PROT SERPL-MCNC: 7.5 G/DL (ref 6.4–8.2)
SODIUM SERPL-SCNC: 139 MMOL/L (ref 136–145)
TSH SERPL DL<=0.005 MIU/L-ACNC: 3.02 UIU/ML (ref 0.27–4.2)

## 2025-05-30 PROCEDURE — 36415 COLL VENOUS BLD VENIPUNCTURE: CPT

## 2025-05-30 PROCEDURE — 80053 COMPREHEN METABOLIC PANEL: CPT

## 2025-05-30 PROCEDURE — 84443 ASSAY THYROID STIM HORMONE: CPT

## 2025-06-02 ENCOUNTER — RESULTS FOLLOW-UP (OUTPATIENT)
Dept: CARDIOLOGY CLINIC | Age: 40
End: 2025-06-02

## 2025-06-03 PROCEDURE — 93295 DEV INTERROG REMOTE 1/2/MLT: CPT | Performed by: INTERNAL MEDICINE

## 2025-06-03 PROCEDURE — 93296 REM INTERROG EVL PM/IDS: CPT | Performed by: INTERNAL MEDICINE

## 2025-07-14 ASSESSMENT — ENCOUNTER SYMPTOMS
HEMATOCHEZIA: 0
WHEEZING: 0
HEMATEMESIS: 0
STRIDOR: 0
RIGHT EYE: 0
LEFT EYE: 0

## 2025-07-15 ENCOUNTER — OFFICE VISIT (OUTPATIENT)
Dept: CARDIOLOGY CLINIC | Age: 40
End: 2025-07-15
Payer: MEDICAID

## 2025-07-15 ENCOUNTER — CLINICAL SUPPORT (OUTPATIENT)
Dept: CARDIOLOGY CLINIC | Age: 40
End: 2025-07-15

## 2025-07-15 VITALS
SYSTOLIC BLOOD PRESSURE: 118 MMHG | OXYGEN SATURATION: 97 % | HEART RATE: 73 BPM | DIASTOLIC BLOOD PRESSURE: 76 MMHG | BODY MASS INDEX: 24.83 KG/M2 | HEIGHT: 65 IN | WEIGHT: 149 LBS

## 2025-07-15 DIAGNOSIS — I48.0 PAF (PAROXYSMAL ATRIAL FIBRILLATION) (HCC): Primary | ICD-10-CM

## 2025-07-15 DIAGNOSIS — Z95.0 PRESENCE OF PERMANENT CARDIAC PACEMAKER: Primary | ICD-10-CM

## 2025-07-15 DIAGNOSIS — R42 DIZZINESS: ICD-10-CM

## 2025-07-15 DIAGNOSIS — I47.20 VENTRICULAR TACHYCARDIA (HCC): ICD-10-CM

## 2025-07-15 DIAGNOSIS — I48.11 LONGSTANDING PERSISTENT ATRIAL FIBRILLATION (HCC): ICD-10-CM

## 2025-07-15 DIAGNOSIS — Z95.810 PRESENCE OF CARDIAC RESYNCHRONIZATION THERAPY DEFIBRILLATOR (CRT-D): ICD-10-CM

## 2025-07-15 DIAGNOSIS — Z51.81 ENCOUNTER FOR MONITORING AMIODARONE THERAPY: ICD-10-CM

## 2025-07-15 DIAGNOSIS — Z79.899 ENCOUNTER FOR MONITORING AMIODARONE THERAPY: ICD-10-CM

## 2025-07-15 LAB
EKG ATRIAL RATE: 40 BPM
EKG DIAGNOSIS: NORMAL
EKG Q-T INTERVAL: 460 MS
EKG QRS DURATION: 176 MS
EKG QTC CALCULATION (BAZETT): 506 MS
EKG R AXIS: 184 DEGREES
EKG T AXIS: 15 DEGREES
EKG VENTRICULAR RATE: 73 BPM

## 2025-07-15 PROCEDURE — G2211 COMPLEX E/M VISIT ADD ON: HCPCS | Performed by: INTERNAL MEDICINE

## 2025-07-15 PROCEDURE — 99214 OFFICE O/P EST MOD 30 MIN: CPT | Performed by: INTERNAL MEDICINE

## 2025-07-15 RX ORDER — METOPROLOL SUCCINATE 25 MG/1
TABLET, EXTENDED RELEASE ORAL
Qty: 90 TABLET | Refills: 2 | Status: SHIPPED | OUTPATIENT
Start: 2025-07-15

## 2025-07-15 ASSESSMENT — ENCOUNTER SYMPTOMS: SHORTNESS OF BREATH: 1

## 2025-07-15 NOTE — PATIENT INSTRUCTIONS
Plan:     Routine labs for amiodarone toxicity in October: TSH and CMP.  Chest x-ray for amiodarone monitoring in October.   Remote device interrogations every three months.   Continue taking current cardiac medications as prescribed.   Follow up with me in 6 months.

## 2025-07-15 NOTE — TELEPHONE ENCOUNTER
FXW RX pending     Last Office Visit: 4/18/2025 Provider: FXW  **Is provider OOT? No    Next Office Visit: 10/17/25 Provider: FXW      Lab orders needed? no   Encounter provider correct? Yes If not, change provider  Script changes since last refill? no    LAST LABS:   CMP:  Lab Results   Component Value Date     05/30/2025    K 4.5 05/30/2025     05/30/2025    CO2 24 05/30/2025    BUN 29 (H) 05/30/2025    CREATININE 1.0 05/30/2025    GLUCOSE 105 (H) 05/30/2025    CALCIUM 8.9 05/30/2025    BILITOT 0.6 05/30/2025    ALKPHOS 88 05/30/2025    AST 38 (H) 05/30/2025    ALT 55 (H) 05/30/2025    LABGLOM >90 05/30/2025    GFRAA >60 12/17/2010    AGRATIO 1.3 05/30/2025

## 2025-07-28 ENCOUNTER — TELEPHONE (OUTPATIENT)
Dept: CARDIOLOGY CLINIC | Age: 40
End: 2025-07-28

## 2025-07-28 NOTE — TELEPHONE ENCOUNTER
Patient reports he has been feeling \"weird\" x 1 week. Dizzy, chest pain, sob intermittently. Taking all medications as prescribed. Does not check his BP at home. Informed patient to check BP if not feeling well. Informed patient to go to ED if near syncope, zaida syncope, chest pain or SOB w/activity occur. V/u.     KXA OOT- Routing to [facility] to advise.

## 2025-07-28 NOTE — TELEPHONE ENCOUNTER
Patient has a [employer] CRT-D, follows with KXA and was last seen in clinic on 7/15/25. Received updated remote transmission that documented additional episodes of NSVT and what appears to be an episode of VT successfully treated with ATP x 1, no shocks (7/27/25 around 6 am, lasted 10 seconds). Hx of this/ muscular dystrophy/ epicardial involvement/ hx of VT RFA with KXA.  On amiodarone and toprol. Sent to Dr. Resendez, via [facility], for review (KXA is out of the office). Thanks!

## 2025-07-29 ENCOUNTER — APPOINTMENT (OUTPATIENT)
Dept: CT IMAGING | Age: 40
DRG: 192 | End: 2025-07-29
Payer: MEDICAID

## 2025-07-29 ENCOUNTER — HOSPITAL ENCOUNTER (INPATIENT)
Age: 40
LOS: 2 days | Discharge: HOME | DRG: 192 | End: 2025-07-31
Attending: EMERGENCY MEDICINE | Admitting: INTERNAL MEDICINE
Payer: MEDICAID

## 2025-07-29 ENCOUNTER — NURSE ONLY (OUTPATIENT)
Dept: CARDIOLOGY CLINIC | Age: 40
End: 2025-07-29

## 2025-07-29 ENCOUNTER — APPOINTMENT (OUTPATIENT)
Dept: GENERAL RADIOLOGY | Age: 40
DRG: 192 | End: 2025-07-29
Payer: MEDICAID

## 2025-07-29 DIAGNOSIS — Z95.810 PRESENCE OF CARDIAC RESYNCHRONIZATION THERAPY DEFIBRILLATOR (CRT-D): Primary | ICD-10-CM

## 2025-07-29 DIAGNOSIS — R07.9 CHEST PAIN, UNSPECIFIED TYPE: ICD-10-CM

## 2025-07-29 DIAGNOSIS — I47.20 VENTRICULAR TACHYCARDIA (HCC): ICD-10-CM

## 2025-07-29 DIAGNOSIS — R06.02 SHORTNESS OF BREATH: ICD-10-CM

## 2025-07-29 DIAGNOSIS — I47.29 NONSUSTAINED VENTRICULAR TACHYCARDIA (HCC): Primary | ICD-10-CM

## 2025-07-29 DIAGNOSIS — R01.1 HEART MURMUR: ICD-10-CM

## 2025-07-29 DIAGNOSIS — I50.23 ACUTE ON CHRONIC SYSTOLIC CONGESTIVE HEART FAILURE (HCC): ICD-10-CM

## 2025-07-29 DIAGNOSIS — I44.2 CHB (COMPLETE HEART BLOCK) (HCC): ICD-10-CM

## 2025-07-29 DIAGNOSIS — R07.9 CHEST PAIN: ICD-10-CM

## 2025-07-29 PROBLEM — I42.8 NICM (NONISCHEMIC CARDIOMYOPATHY) (HCC): Status: ACTIVE | Noted: 2025-07-29

## 2025-07-29 PROBLEM — R00.0 TACHYARRHYTHMIA: Status: ACTIVE | Noted: 2025-07-29

## 2025-07-29 LAB
ALBUMIN SERPL-MCNC: 4.7 G/DL (ref 3.4–5)
ALBUMIN/GLOB SERPL: 1.7 {RATIO} (ref 1.1–2.2)
ALP SERPL-CCNC: 86 U/L (ref 40–129)
ALT SERPL-CCNC: 45 U/L (ref 10–40)
ANION GAP SERPL CALCULATED.3IONS-SCNC: 10 MMOL/L (ref 3–16)
AST SERPL-CCNC: 34 U/L (ref 15–37)
BASOPHILS # BLD: 0.1 K/UL (ref 0–0.2)
BASOPHILS NFR BLD: 0.8 %
BILIRUB SERPL-MCNC: 0.5 MG/DL (ref 0–1)
BUN SERPL-MCNC: 25 MG/DL (ref 7–20)
CALCIUM SERPL-MCNC: 9.2 MG/DL (ref 8.3–10.6)
CHLORIDE SERPL-SCNC: 106 MMOL/L (ref 99–110)
CO2 SERPL-SCNC: 23 MMOL/L (ref 21–32)
CREAT SERPL-MCNC: 0.9 MG/DL (ref 0.9–1.3)
DEPRECATED RDW RBC AUTO: 15.9 % (ref 12.4–15.4)
EKG ATRIAL RATE: 41 BPM
EKG DIAGNOSIS: NORMAL
EKG Q-T INTERVAL: 572 MS
EKG QRS DURATION: 168 MS
EKG QTC CALCULATION (BAZETT): 580 MS
EKG R AXIS: 190 DEGREES
EKG T AXIS: -25 DEGREES
EKG VENTRICULAR RATE: 62 BPM
EOSINOPHIL # BLD: 0.2 K/UL (ref 0–0.6)
EOSINOPHIL NFR BLD: 1.6 %
GFR SERPLBLD CREATININE-BSD FMLA CKD-EPI: >90 ML/MIN/{1.73_M2}
GLUCOSE SERPL-MCNC: 124 MG/DL (ref 70–99)
HCT VFR BLD AUTO: 42.3 % (ref 40.5–52.5)
HGB BLD-MCNC: 14.4 G/DL (ref 13.5–17.5)
LYMPHOCYTES # BLD: 1 K/UL (ref 1–5.1)
LYMPHOCYTES NFR BLD: 10.2 %
MCH RBC QN AUTO: 30.5 PG (ref 26–34)
MCHC RBC AUTO-ENTMCNC: 34 G/DL (ref 31–36)
MCV RBC AUTO: 89.9 FL (ref 80–100)
MONOCYTES # BLD: 0.4 K/UL (ref 0–1.3)
MONOCYTES NFR BLD: 3.8 %
NEUTROPHILS # BLD: 8.6 K/UL (ref 1.7–7.7)
NEUTROPHILS NFR BLD: 83.6 %
NT-PROBNP SERPL-MCNC: 2004 PG/ML (ref 0–124)
PLATELET # BLD AUTO: 182 K/UL (ref 135–450)
PMV BLD AUTO: 8.4 FL (ref 5–10.5)
POTASSIUM SERPL-SCNC: 4.1 MMOL/L (ref 3.5–5.1)
PROT SERPL-MCNC: 7.5 G/DL (ref 6.4–8.2)
RBC # BLD AUTO: 4.71 M/UL (ref 4.2–5.9)
SODIUM SERPL-SCNC: 139 MMOL/L (ref 136–145)
TROPONIN, HIGH SENSITIVITY: 39 NG/L (ref 0–22)
TROPONIN, HIGH SENSITIVITY: 44 NG/L (ref 0–22)
WBC # BLD AUTO: 10.2 K/UL (ref 4–11)

## 2025-07-29 PROCEDURE — 83880 ASSAY OF NATRIURETIC PEPTIDE: CPT

## 2025-07-29 PROCEDURE — 6370000000 HC RX 637 (ALT 250 FOR IP): Performed by: INTERNAL MEDICINE

## 2025-07-29 PROCEDURE — 85025 COMPLETE CBC W/AUTO DIFF WBC: CPT

## 2025-07-29 PROCEDURE — 99223 1ST HOSP IP/OBS HIGH 75: CPT | Performed by: INTERNAL MEDICINE

## 2025-07-29 PROCEDURE — 6360000004 HC RX CONTRAST MEDICATION

## 2025-07-29 PROCEDURE — 2500000003 HC RX 250 WO HCPCS: Performed by: INTERNAL MEDICINE

## 2025-07-29 PROCEDURE — 6370000000 HC RX 637 (ALT 250 FOR IP): Performed by: NURSE PRACTITIONER

## 2025-07-29 PROCEDURE — 74174 CTA ABD&PLVS W/CONTRAST: CPT

## 2025-07-29 PROCEDURE — 1200000000 HC SEMI PRIVATE

## 2025-07-29 PROCEDURE — 93005 ELECTROCARDIOGRAM TRACING: CPT | Performed by: EMERGENCY MEDICINE

## 2025-07-29 PROCEDURE — 99285 EMERGENCY DEPT VISIT HI MDM: CPT

## 2025-07-29 PROCEDURE — 6360000002 HC RX W HCPCS: Performed by: INTERNAL MEDICINE

## 2025-07-29 PROCEDURE — 6370000000 HC RX 637 (ALT 250 FOR IP): Performed by: EMERGENCY MEDICINE

## 2025-07-29 PROCEDURE — 93010 ELECTROCARDIOGRAM REPORT: CPT | Performed by: INTERNAL MEDICINE

## 2025-07-29 PROCEDURE — 80053 COMPREHEN METABOLIC PANEL: CPT

## 2025-07-29 PROCEDURE — 84484 ASSAY OF TROPONIN QUANT: CPT

## 2025-07-29 PROCEDURE — 36415 COLL VENOUS BLD VENIPUNCTURE: CPT

## 2025-07-29 PROCEDURE — 71045 X-RAY EXAM CHEST 1 VIEW: CPT

## 2025-07-29 RX ORDER — PROCHLORPERAZINE EDISYLATE 5 MG/ML
10 INJECTION INTRAMUSCULAR; INTRAVENOUS EVERY 6 HOURS PRN
Status: DISCONTINUED | OUTPATIENT
Start: 2025-07-29 | End: 2025-07-31 | Stop reason: HOSPADM

## 2025-07-29 RX ORDER — ACETAMINOPHEN 650 MG/1
650 SUPPOSITORY RECTAL EVERY 6 HOURS PRN
Status: DISCONTINUED | OUTPATIENT
Start: 2025-07-29 | End: 2025-07-31 | Stop reason: HOSPADM

## 2025-07-29 RX ORDER — SODIUM CHLORIDE 0.9 % (FLUSH) 0.9 %
5-40 SYRINGE (ML) INJECTION PRN
Status: DISCONTINUED | OUTPATIENT
Start: 2025-07-29 | End: 2025-07-31 | Stop reason: HOSPADM

## 2025-07-29 RX ORDER — METOPROLOL SUCCINATE 25 MG/1
25 TABLET, EXTENDED RELEASE ORAL DAILY
Status: DISCONTINUED | OUTPATIENT
Start: 2025-07-29 | End: 2025-07-29

## 2025-07-29 RX ORDER — METOPROLOL SUCCINATE 25 MG/1
12.5 TABLET, EXTENDED RELEASE ORAL 2 TIMES DAILY
Status: DISCONTINUED | OUTPATIENT
Start: 2025-07-29 | End: 2025-07-31 | Stop reason: HOSPADM

## 2025-07-29 RX ORDER — ACETAMINOPHEN 325 MG/1
650 TABLET ORAL EVERY 6 HOURS PRN
Status: DISCONTINUED | OUTPATIENT
Start: 2025-07-29 | End: 2025-07-31 | Stop reason: HOSPADM

## 2025-07-29 RX ORDER — MEXILETINE HYDROCHLORIDE 200 MG/1
200 CAPSULE ORAL 2 TIMES DAILY
Status: DISCONTINUED | OUTPATIENT
Start: 2025-07-29 | End: 2025-07-30

## 2025-07-29 RX ORDER — AMIODARONE HYDROCHLORIDE 200 MG/1
400 TABLET ORAL DAILY
Status: DISCONTINUED | OUTPATIENT
Start: 2025-07-30 | End: 2025-07-30

## 2025-07-29 RX ORDER — ONDANSETRON 2 MG/ML
4 INJECTION INTRAMUSCULAR; INTRAVENOUS EVERY 6 HOURS PRN
Status: DISCONTINUED | OUTPATIENT
Start: 2025-07-29 | End: 2025-07-29

## 2025-07-29 RX ORDER — SPIRONOLACTONE 25 MG/1
25 TABLET ORAL DAILY
Status: DISCONTINUED | OUTPATIENT
Start: 2025-07-29 | End: 2025-07-31 | Stop reason: HOSPADM

## 2025-07-29 RX ORDER — SACUBITRIL AND VALSARTAN 24; 26 MG/1; MG/1
1.5 TABLET, FILM COATED ORAL 2 TIMES DAILY
Status: DISCONTINUED | OUTPATIENT
Start: 2025-07-29 | End: 2025-07-31 | Stop reason: HOSPADM

## 2025-07-29 RX ORDER — SODIUM CHLORIDE 0.9 % (FLUSH) 0.9 %
5-40 SYRINGE (ML) INJECTION EVERY 12 HOURS SCHEDULED
Status: DISCONTINUED | OUTPATIENT
Start: 2025-07-29 | End: 2025-07-31 | Stop reason: HOSPADM

## 2025-07-29 RX ORDER — LORAZEPAM 0.5 MG/1
0.5 TABLET ORAL
Status: DISCONTINUED | OUTPATIENT
Start: 2025-07-30 | End: 2025-07-30 | Stop reason: HOSPADM

## 2025-07-29 RX ORDER — FUROSEMIDE 10 MG/ML
40 INJECTION INTRAMUSCULAR; INTRAVENOUS ONCE
Status: COMPLETED | OUTPATIENT
Start: 2025-07-29 | End: 2025-07-29

## 2025-07-29 RX ORDER — SODIUM CHLORIDE 0.9 % (FLUSH) 0.9 %
5-40 SYRINGE (ML) INJECTION PRN
Status: DISCONTINUED | OUTPATIENT
Start: 2025-07-29 | End: 2025-07-30 | Stop reason: HOSPADM

## 2025-07-29 RX ORDER — POLYETHYLENE GLYCOL 3350 17 G/17G
17 POWDER, FOR SOLUTION ORAL DAILY PRN
Status: DISCONTINUED | OUTPATIENT
Start: 2025-07-29 | End: 2025-07-31 | Stop reason: HOSPADM

## 2025-07-29 RX ORDER — METOPROLOL SUCCINATE 25 MG/1
12.5 TABLET, EXTENDED RELEASE ORAL DAILY
Status: DISCONTINUED | OUTPATIENT
Start: 2025-07-30 | End: 2025-07-29

## 2025-07-29 RX ORDER — SODIUM CHLORIDE 9 MG/ML
INJECTION, SOLUTION INTRAVENOUS PRN
Status: DISCONTINUED | OUTPATIENT
Start: 2025-07-29 | End: 2025-07-30 | Stop reason: HOSPADM

## 2025-07-29 RX ORDER — SODIUM CHLORIDE 9 MG/ML
INJECTION, SOLUTION INTRAVENOUS PRN
Status: DISCONTINUED | OUTPATIENT
Start: 2025-07-29 | End: 2025-07-31 | Stop reason: HOSPADM

## 2025-07-29 RX ORDER — IOPAMIDOL 755 MG/ML
75 INJECTION, SOLUTION INTRAVASCULAR
Status: COMPLETED | OUTPATIENT
Start: 2025-07-29 | End: 2025-07-29

## 2025-07-29 RX ORDER — AMIODARONE HYDROCHLORIDE 200 MG/1
200 TABLET ORAL ONCE
Status: COMPLETED | OUTPATIENT
Start: 2025-07-29 | End: 2025-07-29

## 2025-07-29 RX ORDER — OXYCODONE AND ACETAMINOPHEN 5; 325 MG/1; MG/1
1 TABLET ORAL EVERY 8 HOURS PRN
Refills: 0 | Status: DISCONTINUED | OUTPATIENT
Start: 2025-07-29 | End: 2025-07-30

## 2025-07-29 RX ORDER — AMIODARONE HYDROCHLORIDE 200 MG/1
200 TABLET ORAL DAILY
Status: DISCONTINUED | OUTPATIENT
Start: 2025-07-30 | End: 2025-07-29 | Stop reason: SDUPTHER

## 2025-07-29 RX ORDER — ACETAMINOPHEN 325 MG/1
650 TABLET ORAL ONCE
Status: COMPLETED | OUTPATIENT
Start: 2025-07-29 | End: 2025-07-29

## 2025-07-29 RX ORDER — ASPIRIN 81 MG/1
81 TABLET ORAL DAILY
Status: DISCONTINUED | OUTPATIENT
Start: 2025-07-30 | End: 2025-07-31 | Stop reason: HOSPADM

## 2025-07-29 RX ORDER — ONDANSETRON 2 MG/ML
4 INJECTION INTRAMUSCULAR; INTRAVENOUS EVERY 6 HOURS PRN
Status: DISCONTINUED | OUTPATIENT
Start: 2025-07-29 | End: 2025-07-30 | Stop reason: HOSPADM

## 2025-07-29 RX ORDER — FUROSEMIDE 20 MG/1
20 TABLET ORAL EVERY OTHER DAY
Status: DISCONTINUED | OUTPATIENT
Start: 2025-07-30 | End: 2025-07-30

## 2025-07-29 RX ORDER — ASPIRIN 325 MG
325 TABLET ORAL ONCE
Status: DISCONTINUED | OUTPATIENT
Start: 2025-07-30 | End: 2025-07-29

## 2025-07-29 RX ORDER — ONDANSETRON 4 MG/1
4 TABLET, ORALLY DISINTEGRATING ORAL EVERY 8 HOURS PRN
Status: DISCONTINUED | OUTPATIENT
Start: 2025-07-29 | End: 2025-07-29

## 2025-07-29 RX ORDER — SODIUM CHLORIDE 0.9 % (FLUSH) 0.9 %
5-40 SYRINGE (ML) INJECTION EVERY 12 HOURS SCHEDULED
Status: DISCONTINUED | OUTPATIENT
Start: 2025-07-29 | End: 2025-07-30 | Stop reason: HOSPADM

## 2025-07-29 RX ORDER — SACUBITRIL AND VALSARTAN 24; 26 MG/1; MG/1
1 TABLET, FILM COATED ORAL 2 TIMES DAILY
Status: DISCONTINUED | OUTPATIENT
Start: 2025-07-29 | End: 2025-07-29

## 2025-07-29 RX ORDER — PANTOPRAZOLE SODIUM 40 MG/1
40 TABLET, DELAYED RELEASE ORAL
Status: DISCONTINUED | OUTPATIENT
Start: 2025-07-30 | End: 2025-07-31 | Stop reason: HOSPADM

## 2025-07-29 RX ADMIN — OXYCODONE AND ACETAMINOPHEN 1 TABLET: 5; 325 TABLET ORAL at 21:12

## 2025-07-29 RX ADMIN — AMIODARONE HYDROCHLORIDE 200 MG: 200 TABLET ORAL at 13:26

## 2025-07-29 RX ADMIN — METOPROLOL SUCCINATE 12.5 MG: 25 TABLET, EXTENDED RELEASE ORAL at 21:12

## 2025-07-29 RX ADMIN — SACUBITRIL AND VALSARTAN 1.5 TABLET: 24; 26 TABLET, FILM COATED ORAL at 21:13

## 2025-07-29 RX ADMIN — SODIUM CHLORIDE, PRESERVATIVE FREE 10 ML: 5 INJECTION INTRAVENOUS at 21:22

## 2025-07-29 RX ADMIN — FUROSEMIDE 40 MG: 10 INJECTION, SOLUTION INTRAMUSCULAR; INTRAVENOUS at 14:49

## 2025-07-29 RX ADMIN — IOPAMIDOL 75 ML: 755 INJECTION, SOLUTION INTRAVENOUS at 12:22

## 2025-07-29 RX ADMIN — ACETAMINOPHEN 650 MG: 325 TABLET ORAL at 13:26

## 2025-07-29 RX ADMIN — SPIRONOLACTONE 25 MG: 25 TABLET, FILM COATED ORAL at 21:23

## 2025-07-29 ASSESSMENT — PAIN DESCRIPTION - LOCATION
LOCATION: BACK
LOCATION: BACK
LOCATION: CHEST

## 2025-07-29 ASSESSMENT — PAIN - FUNCTIONAL ASSESSMENT
PAIN_FUNCTIONAL_ASSESSMENT: 0-10
PAIN_FUNCTIONAL_ASSESSMENT: 0-10

## 2025-07-29 ASSESSMENT — PAIN DESCRIPTION - DESCRIPTORS: DESCRIPTORS: ACHING;THROBBING

## 2025-07-29 ASSESSMENT — PAIN SCALES - GENERAL
PAINLEVEL_OUTOF10: 3
PAINLEVEL_OUTOF10: 10
PAINLEVEL_OUTOF10: 8
PAINLEVEL_OUTOF10: 2
PAINLEVEL_OUTOF10: 5

## 2025-07-29 NOTE — ED NOTES
@1257 called  Cardiology  Per: Felipe Smith Jr., PA   RE: nonsustained V tach   Dr. Soto spoke with Dr. Resendez

## 2025-07-29 NOTE — ED NOTES
Alex Isaac is a 39 y.o. male admitted for  Principal Problem:    Tachyarrhythmia  Active Problems:    NICM (nonischemic cardiomyopathy) (HCC)    Nonsustained ventricular tachycardia (HCC)    Acute on chronic systolic congestive heart failure (HCC)    Chest pain  Resolved Problems:    * No resolved hospital problems. *  .   Patient Home via self with   Chief Complaint   Patient presents with    Chest Pain     Pt sent to ED for ongoing chest pain and abnormal reading on medtronic defibrillator.    .  Patient is alert and Person, Place, Time, and Situation  Patient's baseline mobility: Baseline Mobility: wheelchair  Code Status: Prior   Cardiac Rhythm:       Is patient on baseline Oxygen: no how many Liters:   Abnormal Assessment Findings: chest pain, defibrillator malfunction    Isolation:       NIH Score:    C-SSRS: Risk of Suicide: No Risk  Bedside swallow:        Active LDA's:   Peripheral IV 07/29/25 Right Antecubital (Active)   Site Assessment Clean, dry & intact 07/29/25 1130   Line Status Blood return noted;Brisk blood return;Flushed;Normal saline locked;Specimen collected 07/29/25 1130   Phlebitis Assessment No symptoms 07/29/25 1130   Infiltration Assessment 0 07/29/25 1130   Dressing Status New dressing applied;Clean, dry & intact 07/29/25 1130   Dressing Type Transparent 07/29/25 1130   Dressing Intervention New 07/29/25 1130     Patient admitted with a hernandez:  If the hernandez is chronic was it exchanged:  Reason for hernandez:   Patient admitted with Central Line:  . PICC line placement confirmed: YES OR NO:[id number]}   Reason for Central line:   Was central line Inserted from an outside facility:        Family/Caregiver Present no Any Concerns: no   Restraints no  Sitter no         Vitals: MEWS Score: 1    Vitals:    07/29/25 1340 07/29/25 1449 07/29/25 1455 07/29/25 1513   BP: (!) 111/94 114/88 (!) 119/91 (!) 116/103   Pulse: 76  75 76   Resp: 20  16    Temp:       TempSrc:       SpO2: 98%  100% 99%   Weight:

## 2025-07-29 NOTE — ED PROVIDER NOTES
[facility] EMERGENCY DEPARTMENT     EMERGENCY DEPARTMENT ENCOUNTER     Location: [facility] EMERGENCY DEPARTMENT  7/29/2025  Note Started: 4:23 PM EDT 7/29/25      Patient Identification  Alex Isaac is a 39 y.o. male      HPI:Alex Isaac was evaluated in the Emergency Department for chest pain.  Per report, patient was sent to the emergency department for ongoing chest pain with reported abnormal reading on his defibrillator.  Patient denies defibrillation.  No additional complaints at this time.. Although initial history and physical exam information was obtained by JUAN/NPP/MD/ (who also dictated a record of this visit), I personally saw the patient and performed and made/approved the management plan and take responsibility for the patient management.      PHYSICAL EXAM:  General: No acute distress.  Head: Normocephalic/atraumatic.  Heart: Regular rhythm.  Normal S1-S2 P lungs were clear to auscultation bilaterally.  No wheezes, rales, rhonchi.  Neurologic: Stable paraplegia.    EKG Interpretation  Paced rhythm with a rate of 62.  Normal axis.  QTc prolonged at 580.      Rhythm strip: Run of 5 beats of NSVT.      Patient seen and evaluated.  Relevant records reviewed.  MDM  Patient reports chest pain/abnormal pacer readings.      I independently interpreted the following studies:       EKG as noted.  Cardiac enzymes elevated at 44 with elevated BNP.  Normal electrolytes and renal function.  Additionally, normal white count.  CTA of the chest abdomen pelvis is stable without evidence of aortic dilation or dissection.    CLINICAL IMPRESSION  1. Nonsustained ventricular tachycardia (HCC)    2. Chest pain, unspecified type    3. Acute on chronic systolic congestive heart failure (HCC)    4. Heart murmur    5. Shortness of breath    6. Chest pain          Damien FULTON II, DO, am the primary clinician of record.   I personally saw the patient and independently provided 0 minutes of non-concurrent

## 2025-07-29 NOTE — CONSULTS
CARDIOLOGY CONSULTATION        Patient Name: Alex Isaac  Date of admission: 7/29/2025 10:52 AM  Admission Dx: No admission diagnoses are documented for this encounter.  Requesting Physician: No admitting provider for patient encounter.  Primary Care physician: Amadou Rodriguez MD    Reason for Consultation/Chief Complaint: Congestive heart failure    History of Present Illness:     Alex Isaac is a 39 y.o. patient with history of nonischemic cardiomyopathy, chronic HFrEF, muscular dystrophy, paroxysmal atrial fibrillation, PFO, obstructive sleep apnea, hyperlipidemia, hypertension, substance abuse history status post biventricular ICD upgrade November 2023, VT with ICD shock 2/2024 with VT ablation 4/2024 (improved VT but not completely noninducible), who presented to the hospital with complaints of dyspnea.    Pt follows with Dr. Mckeon for VT /ICD and Dr. Michael for his cardiomyopathy.  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  He had been off anticoagulation at that time despite known atrial arrhythmia history.  This was restarted.  EF by echo 11/2023 was less than 20%.  Most recent appointment with Dr. Micheal was in April at which time patient denied ischemic symptoms.  Most recent with Dr. Mckeon earlier this month it was felt that if he has further episodes of VT he may benefit from epicardial VT ablation.  Maintained on amiodarone.    Patient was contacted by our office today regarding recent VT by device interrogation.  He received ATP therapy.  Appeared he had some evidence of volume overload by OptiVol as well.  Directed to come in to the ED for further evaluation    ED course/Vital signs on presentation: VSS.Wt 155. 149 last evaluated 7/15.  EKG showed ventricular paced rhythm, PVCs.   BNP 2k, dec from 4720 9/2024. Troponin 44 (40-50s chronically). Bun 25, Scr 0.9. K 4.1.  CT scan in the ED showed no evidence of aortic dissection, cardiomegaly noted, mild

## 2025-07-29 NOTE — ED PROVIDER NOTES
[facility] EMERGENCY DEPARTMENT  EMERGENCY DEPARTMENT ENCOUNTER        Pt Name: Alex Isaac  MRN: [id number]  Birthdate 1985  Date of evaluation: 7/29/2025  Provider: MALACHI Skelton Jr  PCP: Amadou Rdoriguez MD  Note Started: 12:10 PM EDT 7/29/25       I have seen and evaluated this patient with my supervising physician Damien Soto II, DO.      CHIEF COMPLAINT       Chief Complaint   Patient presents with    Chest Pain     Pt sent to ED for ongoing chest pain and abnormal reading on medtronic defibrillator.        HISTORY OF PRESENT ILLNESS: 1 or more Elements     History from : Patient    Limitations to history : None    Alex Isaac is a 39 y.o. male who presents with reports of chest pain that is radiating through to his back as well as abdominal discomfort and lightheadedness.  Does have a history of muscular dystrophy as well as ventricular tachycardia that he had to have a defibrillator put in place for.  He states that he was told that he had some \"runs of atrial fibrillation\" and need to go to the emergency department.  He states his defibrillator has not been interrogated for around 2 weeks and had no acute events after that however, it seemed like in the days after that interrogation he started having some of the symptoms arise.  Does follow with Dr. Mckeon and Dr. Claudio for cardiology.    Nursing Notes were all reviewed and agreed with or any disagreements were addressed in the HPI.      SURGICAL HISTORY     Past Surgical History:   Procedure Laterality Date    PACEMAKER INSERTION      VASCULAR SURGERY Right 4/16/2024    REPAIR RIGHT ILIAC ARTERY performed by Sergio Sanders MD at [facility] OR       CURRENTMEDICATIONS       Previous Medications    AMIODARONE (CORDARONE) 200 MG TABLET    Take 1 tablet by mouth daily    ELIQUIS 5 MG TABS TABLET    TAKE 1 TABLET BY MOUTH TWICE A DAY    ENTRESTO 24-26 MG PER TABLET    TAKE 1 AND 1/2 TABLET BY MOUTH TWO TIMES A DAY    FUROSEMIDE

## 2025-07-29 NOTE — PROGRESS NOTES
4 Eyes Skin Assessment     NAME:  Alex Isaac  YOB: 1985  MEDICAL RECORD NUMBER:  [id number]    The patient is being assessed for  Admission    I agree that at least one RN has performed a thorough Head to Toe Skin Assessment on the patient. ALL assessment sites listed below have been assessed.      Areas assessed by both nurses:    Head, Face, Ears, Shoulders, Back, Chest, Arms, Elbows, Hands, Sacrum. Buttock, Coccyx, Ischium, Legs. Feet and Heels, and Under Medical Devices         Does the Patient have a Wound? No noted wound(s)       Jasbir Prevention initiated by RN: No  Wound Care Orders initiated by RN: No    For hospital-acquired stage 1 & 2 and ALL Stage 3,4, Unstageable, DTI, NWPT, and Complex wounds: place order “IP Wound Care/Ostomy Nurse Eval and Treat” by RN under : NA    New Ostomies, if present place, Ostomy referral order under : No     Nurse 1 eSignature: Electronically signed by Ronni Parker RN on 7/29/25 at 6:22 PM EDT    **SHARE this note so that the co-signing nurse can place an eSignature**    Nurse 2 eSignature: {Esignature:[id number]}

## 2025-07-29 NOTE — CONSULTS
Electrophysiology Consultation   Date: 7/29/2025  Admit Date:  7/29/2025  Reason for Consultation: Symptomatic non-sustained ventricular tachycardia  Consult Requesting Physician: Waldo Clifford MD     Chief Complaint   Patient presents with    Chest Pain     Pt sent to ED for ongoing chest pain and abnormal reading on medtronic defibrillator.      HPI:   Mr. Isaac is a pleasant 39 year old male with a medical history significant for ventricular tachycardia/fibrillation status post BiV ICD (Medtronic) and ablation, paroxysmal atrial fibrillation, non-ischemic cardiomyopathy with severely depressed LVEF, hypertension, hyperlipidemia, muscular dystrophy, complete heart block, pulmonary hypertension and obstructive sleep apnea who presents from home with chest pain, palpitations, and concern for volume overload.  Patient presented with a myriad of symptoms including palpitations that he had while we were conducting this interview.  He states that over the last few weeks he has been suffering from palpitations, shortness of breath, and chest pains.  He he states that after his ablation his palpitations have significantly improved however he has noted that they have progressively worsened.  He called our office for guidance and was directed to the emergency room given his myriad of symptoms.    Patient denies tobacco use.  He reports marijuana use to help with his chronic pain.  Denies significant alcohol use.    Patient denies fevers, orthopnea, PND, dyspnea on exertion, chills, visual changes, headaches, sore throat, cough, abdominal pain, nausea, vomiting, bleeding, bruising, dysuria, muscle/joint pain, confusion, depression, anxiety, skin lesions, etc.    Emergency Room/Hospital Course:  Patient evaluated emergency room on 07/29/2025.  His CMP was largely reassuring.  His BNP was elevated 2000.  His troponin enzymes were elevated but near his baseline of 44.  His CBC is reassuring.  Chest CTA was largely stable

## 2025-07-29 NOTE — TELEPHONE ENCOUNTER
Patient having some NSVT however only one episode required therapy.  He appears to be holding onto some fluid as well.  I think he needs to check his blood pressure at home and weights.  I think ER visit is very reasonable if he's feeling poorly.  It would allow us to understand what is happening and adjust therapies as indicated by findings.

## 2025-07-29 NOTE — TELEPHONE ENCOUNTER
Spoke with patient who was agreeable to proceed to [place] ED at the recommendation of TRENT.     TRENT TERRY

## 2025-07-30 ENCOUNTER — APPOINTMENT (OUTPATIENT)
Age: 40
DRG: 192 | End: 2025-07-30
Attending: INTERNAL MEDICINE
Payer: MEDICAID

## 2025-07-30 LAB
ALBUMIN SERPL-MCNC: 4.2 G/DL (ref 3.4–5)
ANION GAP SERPL CALCULATED.3IONS-SCNC: 10 MMOL/L (ref 3–16)
BUN SERPL-MCNC: 23 MG/DL (ref 7–20)
CALCIUM SERPL-MCNC: 9.1 MG/DL (ref 8.3–10.6)
CHLORIDE SERPL-SCNC: 107 MMOL/L (ref 99–110)
CHOLEST SERPL-MCNC: 217 MG/DL (ref 0–199)
CO2 SERPL-SCNC: 23 MMOL/L (ref 21–32)
CREAT SERPL-MCNC: 1 MG/DL (ref 0.9–1.3)
DEPRECATED RDW RBC AUTO: 15.1 % (ref 12.4–15.4)
ECHO AO ARCH DIAM: 2.5 CM
ECHO AO ASC DIAM: 2.9 CM
ECHO AO ASCENDING AORTA INDEX: 1.63 CM/M2
ECHO AO DESC DIAM: 2.5 CM
ECHO AO DESCENDING AORTA INDEX: 1.4 CM/M2
ECHO AO ROOT DIAM: 2.7 CM
ECHO AO ROOT INDEX: 1.52 CM/M2
ECHO AV AREA PEAK VELOCITY: 3.1 CM2
ECHO AV AREA/BSA PEAK VELOCITY: 1.7 CM2/M2
ECHO AV PEAK GRADIENT: 5 MMHG
ECHO AV PEAK VELOCITY: 1.1 M/S
ECHO AV VELOCITY RATIO: 0.91
ECHO BSA: 1.8 M2
ECHO BSA: 1.8 M2
ECHO EST RA PRESSURE: 3 MMHG
ECHO IVC PROX: 1.6 CM
ECHO LA AREA 2C: 29.8 CM2
ECHO LA AREA 4C: 25.2 CM2
ECHO LA DIAMETER INDEX: 2.7 CM/M2
ECHO LA DIAMETER: 4.8 CM
ECHO LA MAJOR AXIS: 6.6 CM
ECHO LA MINOR AXIS: 7.8 CM
ECHO LA TO AORTIC ROOT RATIO: 1.78
ECHO LA VOL BP: 90 ML (ref 18–58)
ECHO LA VOL MOD A2C: 91 ML (ref 18–58)
ECHO LA VOL MOD A4C: 76 ML (ref 18–58)
ECHO LA VOL/BSA BIPLANE: 51 ML/M2 (ref 16–34)
ECHO LA VOLUME INDEX MOD A2C: 51 ML/M2 (ref 16–34)
ECHO LA VOLUME INDEX MOD A4C: 43 ML/M2 (ref 16–34)
ECHO LV E' SEPTAL VELOCITY: 7.31 CM/S
ECHO LV EDV A2C: 233 ML
ECHO LV EDV A4C: 250 ML
ECHO LV EDV BP: 243 ML (ref 67–155)
ECHO LV EDV INDEX A4C: 140 ML/M2
ECHO LV EDV INDEX BP: 137 ML/M2
ECHO LV EDV NDEX A2C: 131 ML/M2
ECHO LV EF PHYSICIAN: 24 %
ECHO LV EJECTION FRACTION A2C: 36 %
ECHO LV EJECTION FRACTION A4C: 28 %
ECHO LV EJECTION FRACTION BIPLANE: 32 % (ref 55–100)
ECHO LV ESV A2C: 150 ML
ECHO LV ESV A4C: 181 ML
ECHO LV ESV BP: 166 ML (ref 22–58)
ECHO LV ESV INDEX A2C: 84 ML/M2
ECHO LV ESV INDEX A4C: 102 ML/M2
ECHO LV ESV INDEX BP: 93 ML/M2
ECHO LV FRACTIONAL SHORTENING: 6 % (ref 28–44)
ECHO LV INTERNAL DIMENSION DIASTOLE INDEX: 3.93 CM/M2
ECHO LV INTERNAL DIMENSION DIASTOLIC: 7 CM (ref 4.2–5.9)
ECHO LV INTERNAL DIMENSION SYSTOLIC INDEX: 3.71 CM/M2
ECHO LV INTERNAL DIMENSION SYSTOLIC: 6.6 CM
ECHO LV ISOVOLUMETRIC RELAXATION TIME (IVRT): 67 MS
ECHO LV IVSD: 0.5 CM (ref 0.6–1)
ECHO LV MASS 2D: 174 G (ref 88–224)
ECHO LV MASS INDEX 2D: 97.7 G/M2 (ref 49–115)
ECHO LV POSTERIOR WALL DIASTOLIC: 0.7 CM (ref 0.6–1)
ECHO LV RELATIVE WALL THICKNESS RATIO: 0.2
ECHO LVOT AREA: 3.5 CM2
ECHO LVOT DIAM: 2.1 CM
ECHO LVOT MEAN GRADIENT: 2 MMHG
ECHO LVOT PEAK GRADIENT: 4 MMHG
ECHO LVOT PEAK VELOCITY: 1 M/S
ECHO LVOT STROKE VOLUME INDEX: 31.5 ML/M2
ECHO LVOT SV: 56.1 ML
ECHO LVOT VTI: 16.2 CM
ECHO MV E VELOCITY: 0.84 M/S
ECHO MV E/E' SEPTAL: 11.49
ECHO RA AREA 4C: 15.3 CM2
ECHO RA END SYSTOLIC VOLUME APICAL 4 CHAMBER INDEX BSA: 25 ML/M2
ECHO RA VOLUME: 44 ML
ECHO RIGHT VENTRICULAR SYSTOLIC PRESSURE (RVSP): 24 MMHG
ECHO RV AREA DIASTOLE: 23.8 CM2
ECHO RV AREA SYSTOLE: 17.9 CM2
ECHO RV BASAL DIMENSION: 4.6 CM
ECHO RV END DIASTOLIC AREA INDEX: 13.4 CM2/M2
ECHO RV END SYSTOLIC AREA INDEX: 10.1 CM2/M2
ECHO RV FRACTIONAL AREA CHANGE: 25 %
ECHO RV FREE WALL PEAK S': 10.6 CM/S
ECHO RV INTERNAL DIMENSION: 3.4 CM
ECHO RV LONGITUDINAL DIMENSION: 7.3 CM
ECHO RV MID DIMENSION: 4.3 CM
ECHO RV TAPSE: 2 CM (ref 1.7–?)
ECHO TV REGURGITANT MAX VELOCITY: 2.3 M/S
ECHO TV REGURGITANT PEAK GRADIENT: 21 MMHG
FERRITIN SERPL IA-MCNC: 87.4 NG/ML (ref 30–400)
GFR SERPLBLD CREATININE-BSD FMLA CKD-EPI: >90 ML/MIN/{1.73_M2}
GLUCOSE SERPL-MCNC: 99 MG/DL (ref 70–99)
HCT VFR BLD AUTO: 39.7 % (ref 40.5–52.5)
HDLC SERPL-MCNC: 31 MG/DL (ref 40–60)
HGB BLD-MCNC: 13.6 G/DL (ref 13.5–17.5)
IRON SATN MFR SERPL: 28 % (ref 20–50)
IRON SERPL-MCNC: 72 UG/DL (ref 59–158)
LDLC SERPL CALC-MCNC: 154 MG/DL
MAGNESIUM SERPL-MCNC: 2.56 MG/DL (ref 1.8–2.4)
MCH RBC QN AUTO: 30.8 PG (ref 26–34)
MCHC RBC AUTO-ENTMCNC: 34.4 G/DL (ref 31–36)
MCV RBC AUTO: 89.5 FL (ref 80–100)
PHOSPHATE SERPL-MCNC: 3.4 MG/DL (ref 2.5–4.9)
PLATELET # BLD AUTO: 195 K/UL (ref 135–450)
PMV BLD AUTO: 8.2 FL (ref 5–10.5)
POTASSIUM SERPL-SCNC: 4 MMOL/L (ref 3.5–5.1)
POTASSIUM SERPL-SCNC: 4 MMOL/L (ref 3.5–5.1)
RBC # BLD AUTO: 4.43 M/UL (ref 4.2–5.9)
SODIUM SERPL-SCNC: 140 MMOL/L (ref 136–145)
TIBC SERPL-MCNC: 255 UG/DL (ref 260–445)
TRIGL SERPL-MCNC: 162 MG/DL (ref 0–150)
VLDLC SERPL CALC-MCNC: 32 MG/DL
WBC # BLD AUTO: 7.6 K/UL (ref 4–11)

## 2025-07-30 PROCEDURE — 6370000000 HC RX 637 (ALT 250 FOR IP): Performed by: INTERNAL MEDICINE

## 2025-07-30 PROCEDURE — 6360000004 HC RX CONTRAST MEDICATION: Performed by: INTERNAL MEDICINE

## 2025-07-30 PROCEDURE — 1200000000 HC SEMI PRIVATE

## 2025-07-30 PROCEDURE — 6360000002 HC RX W HCPCS: Performed by: INTERNAL MEDICINE

## 2025-07-30 PROCEDURE — 83550 IRON BINDING TEST: CPT

## 2025-07-30 PROCEDURE — C8929 TTE W OR WO FOL WCON,DOPPLER: HCPCS

## 2025-07-30 PROCEDURE — C1894 INTRO/SHEATH, NON-LASER: HCPCS | Performed by: INTERNAL MEDICINE

## 2025-07-30 PROCEDURE — 99153 MOD SED SAME PHYS/QHP EA: CPT | Performed by: INTERNAL MEDICINE

## 2025-07-30 PROCEDURE — 80061 LIPID PANEL: CPT

## 2025-07-30 PROCEDURE — 99152 MOD SED SAME PHYS/QHP 5/>YRS: CPT | Performed by: INTERNAL MEDICINE

## 2025-07-30 PROCEDURE — 2709999900 HC NON-CHARGEABLE SUPPLY: Performed by: INTERNAL MEDICINE

## 2025-07-30 PROCEDURE — 82728 ASSAY OF FERRITIN: CPT

## 2025-07-30 PROCEDURE — 99233 SBSQ HOSP IP/OBS HIGH 50: CPT | Performed by: NURSE PRACTITIONER

## 2025-07-30 PROCEDURE — 93306 TTE W/DOPPLER COMPLETE: CPT | Performed by: INTERNAL MEDICINE

## 2025-07-30 PROCEDURE — 36415 COLL VENOUS BLD VENIPUNCTURE: CPT

## 2025-07-30 PROCEDURE — C1887 CATHETER, GUIDING: HCPCS | Performed by: INTERNAL MEDICINE

## 2025-07-30 PROCEDURE — B2111ZZ FLUOROSCOPY OF MULTIPLE CORONARY ARTERIES USING LOW OSMOLAR CONTRAST: ICD-10-PCS | Performed by: INTERNAL MEDICINE

## 2025-07-30 PROCEDURE — C1760 CLOSURE DEV, VASC: HCPCS | Performed by: INTERNAL MEDICINE

## 2025-07-30 PROCEDURE — C1769 GUIDE WIRE: HCPCS | Performed by: INTERNAL MEDICINE

## 2025-07-30 PROCEDURE — 6370000000 HC RX 637 (ALT 250 FOR IP): Performed by: NURSE PRACTITIONER

## 2025-07-30 PROCEDURE — 93460 R&L HRT ART/VENTRICLE ANGIO: CPT | Performed by: INTERNAL MEDICINE

## 2025-07-30 PROCEDURE — 4A023N8 MEASUREMENT OF CARDIAC SAMPLING AND PRESSURE, BILATERAL, PERCUTANEOUS APPROACH: ICD-10-PCS | Performed by: INTERNAL MEDICINE

## 2025-07-30 PROCEDURE — 2500000003 HC RX 250 WO HCPCS: Performed by: INTERNAL MEDICINE

## 2025-07-30 PROCEDURE — 80069 RENAL FUNCTION PANEL: CPT

## 2025-07-30 PROCEDURE — 85027 COMPLETE CBC AUTOMATED: CPT

## 2025-07-30 PROCEDURE — 83735 ASSAY OF MAGNESIUM: CPT

## 2025-07-30 PROCEDURE — 83540 ASSAY OF IRON: CPT

## 2025-07-30 RX ORDER — MIDAZOLAM HYDROCHLORIDE 5 MG/ML
INJECTION, SOLUTION INTRAMUSCULAR; INTRAVENOUS PRN
Status: DISCONTINUED | OUTPATIENT
Start: 2025-07-30 | End: 2025-07-30 | Stop reason: HOSPADM

## 2025-07-30 RX ORDER — FUROSEMIDE 20 MG/1
20 TABLET ORAL EVERY OTHER DAY
Status: DISCONTINUED | OUTPATIENT
Start: 2025-08-01 | End: 2025-07-31

## 2025-07-30 RX ORDER — SODIUM CHLORIDE 0.9 % (FLUSH) 0.9 %
5-40 SYRINGE (ML) INJECTION PRN
Status: DISCONTINUED | OUTPATIENT
Start: 2025-07-30 | End: 2025-07-31 | Stop reason: HOSPADM

## 2025-07-30 RX ORDER — MEXILETINE HYDROCHLORIDE 200 MG/1
200 CAPSULE ORAL 2 TIMES DAILY
Status: DISCONTINUED | OUTPATIENT
Start: 2025-07-30 | End: 2025-07-31 | Stop reason: HOSPADM

## 2025-07-30 RX ORDER — OXYCODONE AND ACETAMINOPHEN 5; 325 MG/1; MG/1
1 TABLET ORAL EVERY 4 HOURS PRN
Refills: 0 | Status: DISCONTINUED | OUTPATIENT
Start: 2025-07-30 | End: 2025-07-31 | Stop reason: HOSPADM

## 2025-07-30 RX ORDER — HYDROMORPHONE HYDROCHLORIDE 1 MG/ML
1 INJECTION, SOLUTION INTRAMUSCULAR; INTRAVENOUS; SUBCUTANEOUS ONCE
Status: COMPLETED | OUTPATIENT
Start: 2025-07-30 | End: 2025-07-30

## 2025-07-30 RX ORDER — SODIUM CHLORIDE 0.9 % (FLUSH) 0.9 %
5-40 SYRINGE (ML) INJECTION EVERY 12 HOURS SCHEDULED
Status: DISCONTINUED | OUTPATIENT
Start: 2025-07-30 | End: 2025-07-31 | Stop reason: HOSPADM

## 2025-07-30 RX ORDER — IOPAMIDOL 755 MG/ML
INJECTION, SOLUTION INTRAVASCULAR PRN
Status: DISCONTINUED | OUTPATIENT
Start: 2025-07-30 | End: 2025-07-30 | Stop reason: HOSPADM

## 2025-07-30 RX ORDER — SODIUM CHLORIDE 9 MG/ML
INJECTION, SOLUTION INTRAVENOUS CONTINUOUS
Status: ACTIVE | OUTPATIENT
Start: 2025-07-30 | End: 2025-07-30

## 2025-07-30 RX ORDER — AMIODARONE HYDROCHLORIDE 200 MG/1
400 TABLET ORAL 2 TIMES DAILY
Status: DISCONTINUED | OUTPATIENT
Start: 2025-07-30 | End: 2025-07-31 | Stop reason: HOSPADM

## 2025-07-30 RX ORDER — FENTANYL CITRATE 50 UG/ML
INJECTION, SOLUTION INTRAMUSCULAR; INTRAVENOUS PRN
Status: DISCONTINUED | OUTPATIENT
Start: 2025-07-30 | End: 2025-07-30 | Stop reason: HOSPADM

## 2025-07-30 RX ADMIN — MEXILETINE HYDROCHLORIDE 200 MG: 200 CAPSULE ORAL at 14:28

## 2025-07-30 RX ADMIN — OXYCODONE AND ACETAMINOPHEN 1 TABLET: 5; 325 TABLET ORAL at 06:12

## 2025-07-30 RX ADMIN — SACUBITRIL AND VALSARTAN 1.5 TABLET: 24; 26 TABLET, FILM COATED ORAL at 19:18

## 2025-07-30 RX ADMIN — AMIODARONE HYDROCHLORIDE 400 MG: 200 TABLET ORAL at 08:10

## 2025-07-30 RX ADMIN — METOPROLOL SUCCINATE 12.5 MG: 25 TABLET, EXTENDED RELEASE ORAL at 19:19

## 2025-07-30 RX ADMIN — SODIUM CHLORIDE, PRESERVATIVE FREE 10 ML: 5 INJECTION INTRAVENOUS at 19:19

## 2025-07-30 RX ADMIN — SULFUR HEXAFLUORIDE 2 ML: KIT at 10:10

## 2025-07-30 RX ADMIN — SODIUM CHLORIDE, PRESERVATIVE FREE 10 ML: 5 INJECTION INTRAVENOUS at 08:18

## 2025-07-30 RX ADMIN — Medication 10 ML: at 10:09

## 2025-07-30 RX ADMIN — SACUBITRIL AND VALSARTAN 1.5 TABLET: 24; 26 TABLET, FILM COATED ORAL at 08:10

## 2025-07-30 RX ADMIN — PANTOPRAZOLE SODIUM 40 MG: 40 TABLET, DELAYED RELEASE ORAL at 06:12

## 2025-07-30 RX ADMIN — METOPROLOL SUCCINATE 12.5 MG: 25 TABLET, EXTENDED RELEASE ORAL at 08:11

## 2025-07-30 RX ADMIN — OXYCODONE AND ACETAMINOPHEN 1 TABLET: 5; 325 TABLET ORAL at 19:23

## 2025-07-30 RX ADMIN — AMIODARONE HYDROCHLORIDE 400 MG: 200 TABLET ORAL at 19:19

## 2025-07-30 RX ADMIN — ASPIRIN 81 MG: 81 TABLET, COATED ORAL at 08:10

## 2025-07-30 RX ADMIN — HYDROMORPHONE HYDROCHLORIDE 1 MG: 1 INJECTION, SOLUTION INTRAMUSCULAR; INTRAVENOUS; SUBCUTANEOUS at 14:02

## 2025-07-30 RX ADMIN — MEXILETINE HYDROCHLORIDE 200 MG: 200 CAPSULE ORAL at 19:18

## 2025-07-30 ASSESSMENT — PAIN DESCRIPTION - ORIENTATION
ORIENTATION: LEFT
ORIENTATION: RIGHT;LEFT
ORIENTATION: LEFT
ORIENTATION: RIGHT

## 2025-07-30 ASSESSMENT — PAIN SCALES - GENERAL
PAINLEVEL_OUTOF10: 4
PAINLEVEL_OUTOF10: 3
PAINLEVEL_OUTOF10: 5
PAINLEVEL_OUTOF10: 0
PAINLEVEL_OUTOF10: 8
PAINLEVEL_OUTOF10: 0
PAINLEVEL_OUTOF10: 10
PAINLEVEL_OUTOF10: 10

## 2025-07-30 ASSESSMENT — PAIN DESCRIPTION - LOCATION
LOCATION: LEG
LOCATION: LEG
LOCATION: BACK
LOCATION: BACK;LEG
LOCATION: LEG

## 2025-07-30 ASSESSMENT — PAIN SCALES - WONG BAKER: WONGBAKER_NUMERICALRESPONSE: NO HURT

## 2025-07-30 ASSESSMENT — PAIN DESCRIPTION - DESCRIPTORS
DESCRIPTORS: ACHING
DESCRIPTORS: ACHING;DISCOMFORT
DESCRIPTORS: ACHING

## 2025-07-30 NOTE — DISCHARGE INSTRUCTIONS
Heart Failure Resources:  Heart Failure Interactive Workbook:  Go to https://[employer]/publication/?r=361462 for a Free Heart Failure Interactive Workbook provided by The American Heart Association. This interactive workbook will provide information on Healthier Living with Heart Failure. Please copy and paste link into search bar. Use your mouse to scroll through the pages.    HF [place] morgan:   Heart Failure Free smart phone morgan available for iPhone and Android download. Use your phone to track sodium intake, fluid intake, symptoms, and weight.     Low Sodium Diet / Recipes:  Go to www.[employer] website for “renal” diet which is Low Sodium! [employer] is a dialysis company, but this website offers free seasonal cookbooks. Each quarter, they will release 25-30 new recipes with a breakdown of calories, sodium, and glucose. You can also go to www.[employer]/recipes website for free recipes.     Home Exercise Program:   Identification of Green/Yellow/Red zones:  You should be able to identify when you feel good (green zone), if you have 1-2 symptoms of HF (yellow zone), or if you are in need of medical attention (red zone).  In your CHF education folder you were provided a “stop light tool” to outline this information.     We want to you to rate your exertion levels:    Our therapy team has discussed means of identification with you such as the \"Lihsa scale.\"  The Lisha rating scale ranges from 6 to 20, where 6 means \"no exertion at all\" and 20 means \"maximal exertion.\" The goal is to use this to gauge how much effort it is taking for you to do your normal daily tasks.   You should be able to recognize when too much exertion is being expended.    Elements of Energy Conservation:   Prioritize/Plan: Decide what needs to be done today, and what can wait for a later date, write to do lists, plan ahead to avoid extra trips, and gather supplies and equipment needed before starting an activity.   Position:

## 2025-07-30 NOTE — CARE COORDINATION
Case Management Assessment  Initial Evaluation    Date/Time of Evaluation: 7/30/2025 2:51 PM  Assessment Completed by: Sonya Moraes RN    If patient is discharged prior to next notation, then this note serves as note for discharge by case management.    Patient Name: Alex Isaac                   YOB: 1985  Diagnosis: Shortness of breath [R06.02]  Heart murmur [R01.1]  Chest pain [R07.9]  Tachyarrhythmia [R00.0]  Acute on chronic systolic congestive heart failure (HCC) [I50.23]  Nonsustained ventricular tachycardia (HCC) [I47.29]  Chest pain, unspecified type [R07.9]                   Date / Time: 7/29/2025 10:52 AM    Patient Admission Status: Inpatient   Readmission Risk (Low < 19, Mod (19-27), High > 27): Readmission Risk Score: 10.2    Current PCP: Amadou Rodriguez MD  PCP verified by CM? Yes    Chart Reviewed: Yes      History Provided by: Patient  Patient Orientation: Alert and Oriented, Person, Place, Situation, Self    Patient Cognition: Alert    Hospitalization in the last 30 days (Readmission):  No    If yes, Readmission Assessment in  Navigator will be completed.    Advance Directives:      Code Status: Full Code   Patient's Primary Decision Maker is: Legal Next of Kin    Primary Decision Maker: SUMI KULKARNI - Niece/Nephew - [phone]    Secondary Decision Maker: perri isaacor - Child - [phone]    Discharge Planning:    Patient lives with: Children Type of Home: Apartment  Primary Care Giver: Self  Patient Support Systems include: Children, Family Members   Current Financial resources: Medicaid  Current community resources: None  Current services prior to admission: Durable Medical Equipment            Current DME: Wheelchair            Type of Home Care services:  None    ADLS  Prior functional level: Assistance with the following:, Cooking, Housework  Current functional level: Housework, Cooking, Assistance with the following:    PT AM-PAC:   /24  OT AM-PAC:

## 2025-07-30 NOTE — PROCEDURES
Post Cardiac Catheterization Note.  Full details available in procedure log    DATE: 7/30/2025  PATIENT: Alex Isaac  MRN: [id number]    Procedure Performed:  CPT Code: 89738 US guidance for vascular access  ~NOTE: Ultrasound access was used to access the vessel using the modified seldinger technique after local infiltration of 1-2% lidocaine.   Ultrasound documented/visualized patency, pulsatility, and proper location for puncture. An anterior wall puncture was made. It was determined safety to proceed with access.   Selective coronary angiography  Right heart catheterization  Mynx    Procedure Findings:  Normal left and right coronary angiogram  Normal right heart cath  RA 5  RV 27/5  PA 30/15 mean 20  PAWP 7  PA sat 63%    Conclusion/Recommendations:  GDMT for non-ischemic CMP    Kleber Hidalgo  Interventional Cardiology  [facility]  Office [phone]

## 2025-07-30 NOTE — PLAN OF CARE
Problem: Pain  Goal: Verbalizes/displays adequate comfort level or baseline comfort level  7/30/2025 0821 by Capri Strauss, RN  Flowsheets (Taken 7/30/2025 0821)  Verbalizes/displays adequate comfort level or baseline comfort level:   Encourage patient to monitor pain and request assistance   Assess pain using appropriate pain scale   Administer analgesics based on type and severity of pain and evaluate response  7/29/2025 2237 by Annie Zhou, RN  Outcome: Progressing     Problem: Safety - Adult  Goal: Free from fall injury  7/29/2025 2237 by Annie Zhou, RN  Outcome: Progressing

## 2025-07-30 NOTE — POST SEDATION
Patient:  Alex Isaac   :   1985    A pre-sedation re-evaluation was performed immediately at the end of the procedure.  Procedure:  Cardiac cath  Medications: Procedural sedation with minimal conscious sedation  Complications: None  Estimated Blood Loss: none  Specimens: Were not obtained        Deion Medication and Procedural Reconciliation:  I agree that the documented medications and procedures performed are true.  The medications were given under my order.  The procedures were performed under my direct supervision.    An immediate re-assessment was completed prior to sedation, and it is determined to be safe to proceed.

## 2025-07-30 NOTE — PROGRESS NOTES
[facility]     Electrophysiology                                     Progress Note    Admission date:  2025    Reason for follow up visit: VT/PVC's     HPI/CC: Alex Isaac was admitted on 2025 with chest pain, palpitations and concern for volume overload.  Device interrogation showed that he was treated with ATP for ventricular tachycardia on 2025. Amiodarone was increased for improved suppression of ventricular ectopy. On 2025, he had a normal right and left heart cath. Rhythm has been sinus with .     Subjective: He complains of some leg pain. Denies chest pain, palpitations, shortness of breath, and dizziness.  Reports he stopped taking mexiletine several months ago due to rectal bleeding.    Vitals:  Blood pressure 105/72, pulse 76, temperature 97.5 °F (36.4 °C), temperature source Oral, resp. rate 26, height 1.651 m (5' 5\"), weight 70.3 kg (155 lb), SpO2 94%.  Temp  Av.8 °F (36.6 °C)  Min: 97.2 °F (36.2 °C)  Max: 98.1 °F (36.7 °C)  Pulse  Av.9  Min: 64  Max: 79  BP  Min: 92/63  Max: 119/91  SpO2  Av.5 %  Min: 92 %  Max: 100 %    24 hour I/O    Intake/Output Summary (Last 24 hours) at 2025 1037  Last data filed at 2025 1009  Gross per 24 hour   Intake 10 ml   Output 300 ml   Net -290 ml     Current Facility-Administered Medications   Medication Dose Route Frequency Provider Last Rate Last Admin    [Held by provider] apixaban (ELIQUIS) tablet 5 mg  5 mg Oral BID Waldo Clifford MD        [Held by provider] furosemide (LASIX) tablet 20 mg  20 mg Oral Every Other Day Waldo Clifford MD        pantoprazole (PROTONIX) tablet 40 mg  40 mg Oral QAM AC Waldo Clifford MD   40 mg at 25    spironolactone (ALDACTONE) tablet 25 mg  25 mg Oral Daily Waldo Clifford MD   25 mg at 25    sodium chloride flush 0.9 % injection 5-40 mL  5-40 mL IntraVENous 2 times per day Waldo Clifford MD   10 mL at 25    sodium chloride flush

## 2025-07-30 NOTE — PRE SEDATION
Sedation Plan  ASA: class 4 - patient with severe systemic disease that is a constant threat to life     Mallampati class: II - soft palate, uvula, fauces visible.    Sedation plan: local anesthesia and level 2-1: moderate/analgesia (conscious sedation)    Risks, benefits, and alternatives discussed with patient.  Use of blood products discussed with patient who consented to blood products.       Immediate reassessment prior to sedation:  Patient's status reviewed and vital signs assessed; acceptable to perform procedure and proceed to administer sedation as planned.

## 2025-07-30 NOTE — H&P
[facility] Medicine History & Physical    V 5.1    Date of Admission: 7/29/2025    Date of Service:  Pt seen/examined on 30 July 2025     [x]Admitted to Inpatient with expected LOS greater than two midnights due to medical therapy.  []Placed in Observation status.    Chief Admission Complaint:  sent in by cardiology office w/ chest pain     Presenting Admission History:      39 y.o. male w/ known hx of Muscular Dystrophy who presented to [facility] from cardiology office with apparent aberrant conduction on pacer interrogation.      Does follow with Dr. Mckeon and Dr. Claudio for cardiology     The patient denies any fever/chills or other signs/sxs of systemic illness or identifiable aggravating/alleviating factors.      Assessment/Plan:        Atrial Fibrillation - chronic paroxysmal, of unspecified and clinically unable to determine etiology, w/ CVR .  Normally rate controlled on BBlocker and Amiodarone - continued.  Anticoagulated at baseline on Eliquis - held .    Hx CHB - s/p Pacer.  Hx PFO    CHF - chronic systolic w/ reduced EF 20-25% by Echo dated this admission.  Likely due to Non-ischemic heart disease, likely due to probable non-compaction.  Patient is euvolemic w/ no evidence of acute decompensation.  Continue current medical mgt.  Estimated dry weight 150 pounds.  S/P RHCath w/out occlusive CAD 30 July.  S/P RHCath.       CXR: I have reviewed the CXR with the following interpretation: No acute ASD   EKG:  I have reviewed the EKG with the following interpretation: Paced w/out acute ischemic changes.       Physical Exam Performed:      General appearance:  No apparent distress, appears stated age and cooperative.  HEENT:  Pupils equal, round, and reactive to light. Conjunctivae/corneas clear.  Respiratory:  Normal respiratory effort. Clear to auscultation bilaterally without Rales/Wheezes/Rhonchi.  Cardiovascular:  Regular rate and rhythm with normal S1/S2 without murmurs, rubs or

## 2025-07-31 VITALS
TEMPERATURE: 98.2 F | BODY MASS INDEX: 27.03 KG/M2 | WEIGHT: 162.26 LBS | OXYGEN SATURATION: 98 % | HEART RATE: 62 BPM | RESPIRATION RATE: 18 BRPM | DIASTOLIC BLOOD PRESSURE: 74 MMHG | SYSTOLIC BLOOD PRESSURE: 94 MMHG | HEIGHT: 65 IN

## 2025-07-31 LAB
ALBUMIN SERPL-MCNC: 4 G/DL (ref 3.4–5)
ALP SERPL-CCNC: 73 U/L (ref 40–129)
ALT SERPL-CCNC: 35 U/L (ref 10–40)
ANION GAP SERPL CALCULATED.3IONS-SCNC: 10 MMOL/L (ref 3–16)
AST SERPL-CCNC: 22 U/L (ref 15–37)
BILIRUB DIRECT SERPL-MCNC: 0.2 MG/DL (ref 0–0.3)
BILIRUB INDIRECT SERPL-MCNC: 0.2 MG/DL (ref 0–1)
BILIRUB SERPL-MCNC: 0.4 MG/DL (ref 0–1)
BUN SERPL-MCNC: 28 MG/DL (ref 7–20)
CALCIUM SERPL-MCNC: 8.7 MG/DL (ref 8.3–10.6)
CHLORIDE SERPL-SCNC: 106 MMOL/L (ref 99–110)
CO2 SERPL-SCNC: 22 MMOL/L (ref 21–32)
CREAT SERPL-MCNC: 1 MG/DL (ref 0.9–1.3)
DEPRECATED RDW RBC AUTO: 15.2 % (ref 12.4–15.4)
EST. AVERAGE GLUCOSE BLD GHB EST-MCNC: 105.4 MG/DL
GFR SERPLBLD CREATININE-BSD FMLA CKD-EPI: >90 ML/MIN/{1.73_M2}
GLUCOSE SERPL-MCNC: 106 MG/DL (ref 70–99)
HBA1C MFR BLD: 5.3 %
HCT VFR BLD AUTO: 37.7 % (ref 40.5–52.5)
HGB BLD-MCNC: 13 G/DL (ref 13.5–17.5)
MAGNESIUM SERPL-MCNC: 2.5 MG/DL (ref 1.8–2.4)
MCH RBC QN AUTO: 30.9 PG (ref 26–34)
MCHC RBC AUTO-ENTMCNC: 34.5 G/DL (ref 31–36)
MCV RBC AUTO: 89.5 FL (ref 80–100)
PHOSPHATE SERPL-MCNC: 3.4 MG/DL (ref 2.5–4.9)
PLATELET # BLD AUTO: 188 K/UL (ref 135–450)
PMV BLD AUTO: 8.8 FL (ref 5–10.5)
POTASSIUM SERPL-SCNC: 3.7 MMOL/L (ref 3.5–5.1)
PROT SERPL-MCNC: 6.4 G/DL (ref 6.4–8.2)
RBC # BLD AUTO: 4.21 M/UL (ref 4.2–5.9)
SODIUM SERPL-SCNC: 138 MMOL/L (ref 136–145)
WBC # BLD AUTO: 9 K/UL (ref 4–11)

## 2025-07-31 PROCEDURE — 6370000000 HC RX 637 (ALT 250 FOR IP): Performed by: INTERNAL MEDICINE

## 2025-07-31 PROCEDURE — 83735 ASSAY OF MAGNESIUM: CPT

## 2025-07-31 PROCEDURE — 99232 SBSQ HOSP IP/OBS MODERATE 35: CPT | Performed by: NURSE PRACTITIONER

## 2025-07-31 PROCEDURE — 80048 BASIC METABOLIC PNL TOTAL CA: CPT

## 2025-07-31 PROCEDURE — 85027 COMPLETE CBC AUTOMATED: CPT

## 2025-07-31 PROCEDURE — 84100 ASSAY OF PHOSPHORUS: CPT

## 2025-07-31 PROCEDURE — 6370000000 HC RX 637 (ALT 250 FOR IP): Performed by: NURSE PRACTITIONER

## 2025-07-31 PROCEDURE — 36415 COLL VENOUS BLD VENIPUNCTURE: CPT

## 2025-07-31 PROCEDURE — 80076 HEPATIC FUNCTION PANEL: CPT

## 2025-07-31 PROCEDURE — 83036 HEMOGLOBIN GLYCOSYLATED A1C: CPT

## 2025-07-31 RX ORDER — FUROSEMIDE 20 MG/1
20 TABLET ORAL EVERY OTHER DAY
Status: DISCONTINUED | OUTPATIENT
Start: 2025-07-31 | End: 2025-07-31 | Stop reason: HOSPADM

## 2025-07-31 RX ORDER — MEXILETINE HYDROCHLORIDE 200 MG/1
200 CAPSULE ORAL 2 TIMES DAILY
Qty: 90 CAPSULE | Refills: 1 | Status: SHIPPED | OUTPATIENT
Start: 2025-07-31

## 2025-07-31 RX ORDER — AMIODARONE HYDROCHLORIDE 400 MG/1
400 TABLET ORAL 2 TIMES DAILY
Qty: 26 TABLET | Refills: 0 | Status: SHIPPED | OUTPATIENT
Start: 2025-07-31 | End: 2025-08-13

## 2025-07-31 RX ADMIN — METOPROLOL SUCCINATE 12.5 MG: 25 TABLET, EXTENDED RELEASE ORAL at 08:46

## 2025-07-31 RX ADMIN — OXYCODONE AND ACETAMINOPHEN 1 TABLET: 5; 325 TABLET ORAL at 08:57

## 2025-07-31 RX ADMIN — MEXILETINE HYDROCHLORIDE 200 MG: 200 CAPSULE ORAL at 08:57

## 2025-07-31 RX ADMIN — APIXABAN 5 MG: 5 TABLET, FILM COATED ORAL at 11:23

## 2025-07-31 RX ADMIN — SPIRONOLACTONE 25 MG: 25 TABLET, FILM COATED ORAL at 08:46

## 2025-07-31 RX ADMIN — ASPIRIN 81 MG: 81 TABLET, COATED ORAL at 08:46

## 2025-07-31 RX ADMIN — SACUBITRIL AND VALSARTAN 1.5 TABLET: 24; 26 TABLET, FILM COATED ORAL at 08:46

## 2025-07-31 RX ADMIN — AMIODARONE HYDROCHLORIDE 400 MG: 200 TABLET ORAL at 08:46

## 2025-07-31 RX ADMIN — FUROSEMIDE 20 MG: 20 TABLET ORAL at 11:23

## 2025-07-31 RX ADMIN — PANTOPRAZOLE SODIUM 40 MG: 40 TABLET, DELAYED RELEASE ORAL at 05:03

## 2025-07-31 ASSESSMENT — PAIN SCALES - GENERAL: PAINLEVEL_OUTOF10: 0

## 2025-07-31 NOTE — DISCHARGE SUMMARY
aneurysm, or dissection. 2.  Evidence of reactive airway disease with subsegmental peripheral atelectasis and air trapping with mild groundglass mosaic attenuation 3.  Cardiomegaly Abdomen/pelvis: 1.  No evidence of aortic aneurysm or dissection 2.  Normal vascular structures 3.  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  Total colonic diverticulosis more extensive in the sigmoid colon with adjacent soft tissue thickening suggesting chronic recurrent inflammatory changes 5.  Status post cholecystectomy Electronically signed by Romeo Partida    XR CHEST PORTABLE  Result Date: 7/29/2025  EXAM: XR CHEST PORTABLE INDICATION: cp, sob COMPARISON: September 23, 2024 FINDINGS: Medical Devices: A left chest wall cardiac device is unchanged from prior examination. Lungs: The lungs are clear. Pleura: No pneumothorax or pleural effusion. Heart and Mediastinum: The heart is enlarged. Bones: No acute suspicious abnormality.     1.  No acute cardiopulmonary findings. 2.  Cardiomegaly Electronically signed by Tyson Mullins      Consults:     IP CONSULT TO CARDIOLOGY  IP CONSULT TO CARDIOLOGY  IP CONSULT TO HOSPITALIST  IP CONSULT TO HEART FAILURE NURSE/COORDINATOR    Labs:     No results for input(s): \"WBC\", \"HGB\", \"HCT\", \"PLT\" in the last 72 hours.    No results for input(s): \"NA\", \"K\", \"CL\", \"CO2\", \"BUN\", \"CREATININE\", \"CALCIUM\", \"MG\", \"PHOS\" in the last 72 hours.    No results for input(s): \"PROBNP\", \"TROPHS\" in the last 72 hours.    No results for input(s): \"LABA1C\" in the last 72 hours.    No results for input(s): \"AST\", \"ALT\", \"BILIDIR\", \"BILITOT\", \"ALKPHOS\" in the last 72 hours.    No results for input(s): \"INR\", \"LACTA\", \"TSH\" in the last 72 hours.    Urine Cultures: No results found for: \"LABURIN\"  Blood Cultures:   Lab Results   Component Value Date/Time    BC No Growth after 4 days of incubation. 07/31/2023 12:46 PM     Lab Results   Component Value Date/Time    BLOODCULT2 No Growth after 4 days of incubation.

## 2025-07-31 NOTE — PROGRESS NOTES
[facility]     Electrophysiology                                     Progress Note    Admission date:  2025    Reason for follow up visit: VT/PVC's     HPI/CC: Alex Isaac was admitted on 2025 with chest pain, palpitations and concern for volume overload.  Device interrogation showed that he was treated with ATP for ventricular tachycardia on 2025. Amiodarone was increased for improved suppression of ventricular ectopy. On 2025, he had a normal right and left heart cath. Rhythm has been sinus with  and occasional PVC's and NSVT.     Subjective: He is feeling well.  Ready for discharge.  Denies chest pain, shortness breath, palpitations or dizziness.    Vitals:  Blood pressure 112/81, pulse 65, temperature 98 °F (36.7 °C), resp. rate 18, height 1.651 m (5' 5\"), weight 73.6 kg (162 lb 4.1 oz), SpO2 97%.  Temp  Av.8 °F (36.6 °C)  Min: 97.5 °F (36.4 °C)  Max: 98.4 °F (36.9 °C)  Pulse  Av.6  Min: 63  Max: 75  BP  Min: 95/73  Max: 112/81  SpO2  Av.7 %  Min: 96 %  Max: 100 %    24 hour I/O    Intake/Output Summary (Last 24 hours) at 2025 1040  Last data filed at 2025 1801  Gross per 24 hour   Intake 240 ml   Output 5 ml   Net 235 ml     Current Facility-Administered Medications   Medication Dose Route Frequency Provider Last Rate Last Admin    apixaban (ELIQUIS) tablet 5 mg  5 mg Oral BID Celestina Garcia APRN - CNP        furosemide (LASIX) tablet 20 mg  20 mg Oral Every Other Day Celestina Garcia APRN - CNP        mexiletine (MEXITIL) capsule 200 mg  200 mg Oral BID Kleber Hidalgo MD   200 mg at 25 0857    sodium chloride flush 0.9 % injection 5-40 mL  5-40 mL IntraVENous 2 times per day Kleber Hidalgo MD        sodium chloride flush 0.9 % injection 5-40 mL  5-40 mL IntraVENous PRN Kleber Hidalgo MD        perflutren lipid microspheres (DEFINITY) injection 1.5 mL  1.5 mL IntraVENous ONCE PRN Reyna Thao, APRN - CNP

## 2025-08-01 ENCOUNTER — FOLLOWUP TELEPHONE ENCOUNTER (OUTPATIENT)
Dept: TELEMETRY | Age: 40
End: 2025-08-01

## 2025-08-01 NOTE — TELEPHONE ENCOUNTER
Care Transitions Initial Follow Up Call    Call within 2 business days of discharge: Yes     Patient: Alex Isaac Patient : 1985 MRN: [id number]    [unfilled]    RARS: Readmission Risk Score: 10.9       Spoke with: patient     Discharge department/facility: home     Non-face-to-face services provided:  Scheduled appointment with PCP-7 days     Call placed to patient for hospital follow up.  Pt states he is doing well.  Writer reviewed Jardiance with patient as he was unsure why it was ordered.   Denies any shortness of breath at this time.         Follow Up  Future Appointments   Date Time Provider Department Center   2025  1:00 PM Celestina Garcia APRN - CNP [facility]   2025  9:00 AM Reyna Thao APRN - CNP [facility]   10/17/2025  1:30 PM Shilo Michael MD [facility]   2025 11:15 AM CATHI SHERIDAN DEVICE CHECK [facility]   2025 11:15 AM Sheila Aguilar APRN - CNP [facility]       Myrna Yanez RN

## 2025-08-12 ENCOUNTER — OFFICE VISIT (OUTPATIENT)
Dept: CARDIOLOGY CLINIC | Age: 40
End: 2025-08-12
Payer: MEDICAID

## 2025-08-12 ENCOUNTER — HOSPITAL ENCOUNTER (OUTPATIENT)
Dept: LAB | Age: 40
Discharge: HOME | End: 2025-08-12
Payer: MEDICAID

## 2025-08-12 VITALS
WEIGHT: 163.5 LBS | BODY MASS INDEX: 27.24 KG/M2 | HEART RATE: 61 BPM | HEIGHT: 65 IN | OXYGEN SATURATION: 95 % | SYSTOLIC BLOOD PRESSURE: 90 MMHG | DIASTOLIC BLOOD PRESSURE: 58 MMHG

## 2025-08-12 DIAGNOSIS — K62.5 BRBPR (BRIGHT RED BLOOD PER RECTUM): ICD-10-CM

## 2025-08-12 DIAGNOSIS — Z09 HOSPITAL DISCHARGE FOLLOW-UP: ICD-10-CM

## 2025-08-12 DIAGNOSIS — I48.0 PAROXYSMAL ATRIAL FIBRILLATION (HCC): ICD-10-CM

## 2025-08-12 DIAGNOSIS — R29.818 SUSPECTED SLEEP APNEA: ICD-10-CM

## 2025-08-12 DIAGNOSIS — I50.22 CHRONIC HFREF (HEART FAILURE WITH REDUCED EJECTION FRACTION) (HCC): ICD-10-CM

## 2025-08-12 DIAGNOSIS — I42.8 NICM (NONISCHEMIC CARDIOMYOPATHY) (HCC): Primary | ICD-10-CM

## 2025-08-12 DIAGNOSIS — I47.20 VENTRICULAR TACHYCARDIA (HCC): ICD-10-CM

## 2025-08-12 DIAGNOSIS — Z95.0 PRESENCE OF PERMANENT CARDIAC PACEMAKER: ICD-10-CM

## 2025-08-12 LAB
ANION GAP SERPL CALCULATED.3IONS-SCNC: 13 MMOL/L (ref 3–16)
BASOPHILS # BLD: 0.1 K/UL (ref 0–0.2)
BASOPHILS NFR BLD: 1 %
BUN SERPL-MCNC: 33 MG/DL (ref 7–20)
CALCIUM SERPL-MCNC: 8.8 MG/DL (ref 8.3–10.6)
CHLORIDE SERPL-SCNC: 108 MMOL/L (ref 99–110)
CO2 SERPL-SCNC: 19 MMOL/L (ref 21–32)
CREAT SERPL-MCNC: 1.1 MG/DL (ref 0.9–1.3)
DEPRECATED RDW RBC AUTO: 15.5 % (ref 12.4–15.4)
EOSINOPHIL # BLD: 0.1 K/UL (ref 0–0.6)
EOSINOPHIL NFR BLD: 1.1 %
GFR SERPLBLD CREATININE-BSD FMLA CKD-EPI: 86 ML/MIN/{1.73_M2}
GLUCOSE SERPL-MCNC: 102 MG/DL (ref 70–99)
HCT VFR BLD AUTO: 42.7 % (ref 40.5–52.5)
HGB BLD-MCNC: 14.2 G/DL (ref 13.5–17.5)
LYMPHOCYTES # BLD: 0.9 K/UL (ref 1–5.1)
LYMPHOCYTES NFR BLD: 10.4 %
MCH RBC QN AUTO: 30 PG (ref 26–34)
MCHC RBC AUTO-ENTMCNC: 33.1 G/DL (ref 31–36)
MCV RBC AUTO: 90.4 FL (ref 80–100)
MONOCYTES # BLD: 0.5 K/UL (ref 0–1.3)
MONOCYTES NFR BLD: 5.8 %
NEUTROPHILS # BLD: 6.8 K/UL (ref 1.7–7.7)
NEUTROPHILS NFR BLD: 81.7 %
PLATELET # BLD AUTO: 220 K/UL (ref 135–450)
PMV BLD AUTO: 8.9 FL (ref 5–10.5)
POTASSIUM SERPL-SCNC: 4.1 MMOL/L (ref 3.5–5.1)
RBC # BLD AUTO: 4.73 M/UL (ref 4.2–5.9)
SODIUM SERPL-SCNC: 140 MMOL/L (ref 136–145)
WBC # BLD AUTO: 8.3 K/UL (ref 4–11)

## 2025-08-12 PROCEDURE — 99214 OFFICE O/P EST MOD 30 MIN: CPT | Performed by: NURSE PRACTITIONER

## 2025-08-12 PROCEDURE — 36415 COLL VENOUS BLD VENIPUNCTURE: CPT

## 2025-08-12 PROCEDURE — 1111F DSCHRG MED/CURRENT MED MERGE: CPT | Performed by: NURSE PRACTITIONER

## 2025-08-12 PROCEDURE — 85025 COMPLETE CBC W/AUTO DIFF WBC: CPT

## 2025-08-12 PROCEDURE — 80048 BASIC METABOLIC PNL TOTAL CA: CPT

## 2025-08-12 ASSESSMENT — ENCOUNTER SYMPTOMS
RESPIRATORY NEGATIVE: 1
CONSTIPATION: 1
EYES NEGATIVE: 1
BLOOD IN STOOL: 1

## 2025-08-15 ENCOUNTER — RESULTS FOLLOW-UP (OUTPATIENT)
Dept: CARDIOLOGY CLINIC | Age: 40
End: 2025-08-15

## 2025-08-22 DIAGNOSIS — I47.20 VENTRICULAR TACHYCARDIA (HCC): ICD-10-CM

## 2025-08-22 RX ORDER — AMIODARONE HYDROCHLORIDE 400 MG/1
400 TABLET ORAL DAILY
Qty: 90 TABLET | Refills: 1 | Status: SHIPPED | OUTPATIENT
Start: 2025-08-22

## 2025-08-27 RX ORDER — AMIODARONE HYDROCHLORIDE 200 MG/1
200 TABLET ORAL DAILY
Qty: 30 TABLET | OUTPATIENT
Start: 2025-08-27

## 2025-08-28 ENCOUNTER — TELEPHONE (OUTPATIENT)
Dept: CARDIOLOGY CLINIC | Age: 40
End: 2025-08-28

## 2025-08-28 RX ORDER — FUROSEMIDE 20 MG/1
20 TABLET ORAL EVERY OTHER DAY
Qty: 90 TABLET | Refills: 1 | Status: SHIPPED | OUTPATIENT
Start: 2025-08-28

## (undated) DEVICE — SUTURE NONABSORBABLE MONOFILAMENT 6-0 C-1 1X30 IN PROLENE 8706H

## (undated) DEVICE — SHEET,DRAPE,53X77,STERILE: Brand: MEDLINE

## (undated) DEVICE — STAPLER EXT SKIN 35 WIDE S STL STPL SQUEEZE HNDL VISISTAT

## (undated) DEVICE — GLIDESHEATH SLENDER STAINLESS STEEL KIT: Brand: GLIDESHEATH SLENDER

## (undated) DEVICE — CATHETER PULM ART 5FR INFL 0.75CC L110CM BAL DIA8MM SGL WDG

## (undated) DEVICE — DISH PETRI ST LF

## (undated) DEVICE — ADHESIVE SKIN CLOSURE WND 8.661X1.5 IN 22 CM LIQUIBAND SECUR

## (undated) DEVICE — 3 ML SYRINGE LUER-LOCK TIP: Brand: MONOJECT

## (undated) DEVICE — GLOVE SURG SZ 8 L11.77IN FNGR THK9.8MIL STRW LTX POLYMER

## (undated) DEVICE — FOGARTY - HYDRAGRIP SURGICAL - CLAMP INSERTS: Brand: FOGARTY SOFTJAW

## (undated) DEVICE — Device

## (undated) DEVICE — SOLUTION IRRIG 1000ML 0.9% SOD CHL USP POUR PLAS BTL

## (undated) DEVICE — TRAY,ERASE CAUTI,URN MTR,SILI,16FR10ML: Brand: MEDLINE

## (undated) DEVICE — INTENDED TO BE USED TO OCCLUDE, RETRACT AND IDENTIFY ARTERIES, VEINS, TENDONS AND NERVES IN SURGICAL PROCEDURES: Brand: STERION®  VESSEL LOOP

## (undated) DEVICE — PINNACLE INTRODUCER SHEATH: Brand: PINNACLE

## (undated) DEVICE — FOGARTY OCCLUSION CATHETER 5F 40CM: Brand: FOGARTY

## (undated) DEVICE — LOOP VES L406MM DIA1MM MAXI BLU SIL RADPQ DISP

## (undated) DEVICE — CATHETER DIAG 5FR L100CM LUMN ID0.047IN JL4 CRV 0 SIDE H

## (undated) DEVICE — GUIDEWIRE VASC L150CM DIA0.035IN FLX END L7CM J 3MM PTFE

## (undated) DEVICE — CATH LAB PACK: Brand: MEDLINE INDUSTRIES, INC.

## (undated) DEVICE — SOLUTION IV HEPARIN SODIUM SODIUM CHL 0.9% 500 ML INJ VIAFLX

## (undated) DEVICE — GEL US 20GM NONIRRITATING OVERWRAPPED FILE PCH TRNSMIT

## (undated) DEVICE — SUTURE NONABSORBABLE MONOFILAMENT 5-0 C-1 1X24 IN PROLENE 8725H

## (undated) DEVICE — CATHETER DIAG 5FR L100CM LUMN ID0.047IN JR4 CRV 0 SIDE H

## (undated) DEVICE — DEVICE VASC CLSR 5FR GRY W/ INTEGR SEAL 10ML LOK SYR

## (undated) DEVICE — AGENT HEMSTAT W2XL4IN OXIDIZED REGENERATED CELOS ABSRB

## (undated) DEVICE — GOWN SIRUS NONREIN XL W/TWL: Brand: MEDLINE INDUSTRIES, INC.